# Patient Record
Sex: MALE | Race: WHITE | NOT HISPANIC OR LATINO | Employment: FULL TIME | URBAN - NONMETROPOLITAN AREA
[De-identification: names, ages, dates, MRNs, and addresses within clinical notes are randomized per-mention and may not be internally consistent; named-entity substitution may affect disease eponyms.]

---

## 2017-10-24 ENCOUNTER — APPOINTMENT (EMERGENCY)
Dept: CT IMAGING | Facility: HOSPITAL | Age: 57
End: 2017-10-24
Payer: OTHER MISCELLANEOUS

## 2017-10-24 ENCOUNTER — HOSPITAL ENCOUNTER (EMERGENCY)
Facility: HOSPITAL | Age: 57
Discharge: HOME | End: 2017-10-24
Attending: EMERGENCY MEDICINE | Admitting: EMERGENCY MEDICINE
Payer: OTHER MISCELLANEOUS

## 2017-10-24 VITALS
OXYGEN SATURATION: 97 % | SYSTOLIC BLOOD PRESSURE: 183 MMHG | HEART RATE: 80 BPM | WEIGHT: 270 LBS | RESPIRATION RATE: 18 BRPM | TEMPERATURE: 97.8 F | DIASTOLIC BLOOD PRESSURE: 96 MMHG

## 2017-10-24 DIAGNOSIS — R10.9 ACUTE RIGHT FLANK PAIN: Primary | ICD-10-CM

## 2017-10-24 LAB
ANION GAP SERPL CALCULATED.3IONS-SCNC: 8 MMOL/L (ref 4–13)
BASOPHILS # BLD AUTO: 0.03 THOUSANDS/ΜL (ref 0–0.1)
BASOPHILS NFR BLD AUTO: 0 % (ref 0–1)
BILIRUB UR QL STRIP: NEGATIVE
BUN SERPL-MCNC: 15 MG/DL (ref 5–25)
CALCIUM SERPL-MCNC: 8.9 MG/DL (ref 8.3–10.1)
CHLORIDE SERPL-SCNC: 104 MMOL/L (ref 100–108)
CLARITY UR: CLEAR
CO2 SERPL-SCNC: 26 MMOL/L (ref 21–32)
COLOR UR: YELLOW
CREAT SERPL-MCNC: 0.96 MG/DL (ref 0.6–1.3)
EOSINOPHIL # BLD AUTO: 0.28 THOUSAND/ΜL (ref 0–0.61)
EOSINOPHIL NFR BLD AUTO: 3 % (ref 0–6)
ERYTHROCYTE [DISTWIDTH] IN BLOOD BY AUTOMATED COUNT: 12.4 % (ref 11.6–15.1)
GFR SERPL CREATININE-BSD FRML MDRD: 87 ML/MIN/1.73SQ M
GLUCOSE SERPL-MCNC: 89 MG/DL (ref 65–140)
GLUCOSE UR STRIP-MCNC: NEGATIVE MG/DL
HCT VFR BLD AUTO: 41.7 % (ref 36.5–49.3)
HGB BLD-MCNC: 14.4 G/DL (ref 12–17)
HGB UR QL STRIP.AUTO: NEGATIVE
KETONES UR STRIP-MCNC: NEGATIVE MG/DL
LEUKOCYTE ESTERASE UR QL STRIP: NEGATIVE
LYMPHOCYTES # BLD AUTO: 3.06 THOUSANDS/ΜL (ref 0.6–4.47)
LYMPHOCYTES NFR BLD AUTO: 38 % (ref 14–44)
MCH RBC QN AUTO: 30.4 PG (ref 26.8–34.3)
MCHC RBC AUTO-ENTMCNC: 34.5 G/DL (ref 31.4–37.4)
MCV RBC AUTO: 88 FL (ref 82–98)
MONOCYTES # BLD AUTO: 0.96 THOUSAND/ΜL (ref 0.17–1.22)
MONOCYTES NFR BLD AUTO: 12 % (ref 4–12)
NEUTROPHILS # BLD AUTO: 3.82 THOUSANDS/ΜL (ref 1.85–7.62)
NEUTS SEG NFR BLD AUTO: 47 % (ref 43–75)
NITRITE UR QL STRIP: NEGATIVE
PH UR STRIP.AUTO: 5.5 [PH] (ref 4.5–8)
PLATELET # BLD AUTO: 215 THOUSANDS/UL (ref 149–390)
PMV BLD AUTO: 9.5 FL (ref 8.9–12.7)
POTASSIUM SERPL-SCNC: 3.8 MMOL/L (ref 3.5–5.3)
PROT UR STRIP-MCNC: NEGATIVE MG/DL
RBC # BLD AUTO: 4.74 MILLION/UL (ref 3.88–5.62)
SODIUM SERPL-SCNC: 138 MMOL/L (ref 136–145)
SP GR UR STRIP.AUTO: 1.02 (ref 1–1.03)
UROBILINOGEN UR QL STRIP.AUTO: 0.2 E.U./DL
WBC # BLD AUTO: 8.15 THOUSAND/UL (ref 4.31–10.16)

## 2017-10-24 PROCEDURE — 80048 BASIC METABOLIC PNL TOTAL CA: CPT | Performed by: EMERGENCY MEDICINE

## 2017-10-24 PROCEDURE — 81003 URINALYSIS AUTO W/O SCOPE: CPT | Performed by: EMERGENCY MEDICINE

## 2017-10-24 PROCEDURE — 96374 THER/PROPH/DIAG INJ IV PUSH: CPT

## 2017-10-24 PROCEDURE — 96376 TX/PRO/DX INJ SAME DRUG ADON: CPT

## 2017-10-24 PROCEDURE — 99284 EMERGENCY DEPT VISIT MOD MDM: CPT

## 2017-10-24 PROCEDURE — 96361 HYDRATE IV INFUSION ADD-ON: CPT

## 2017-10-24 PROCEDURE — 85025 COMPLETE CBC W/AUTO DIFF WBC: CPT | Performed by: EMERGENCY MEDICINE

## 2017-10-24 PROCEDURE — 74176 CT ABD & PELVIS W/O CONTRAST: CPT

## 2017-10-24 PROCEDURE — 36415 COLL VENOUS BLD VENIPUNCTURE: CPT | Performed by: EMERGENCY MEDICINE

## 2017-10-24 PROCEDURE — 96375 TX/PRO/DX INJ NEW DRUG ADDON: CPT

## 2017-10-24 RX ORDER — MORPHINE SULFATE 15 MG/1
15 TABLET ORAL EVERY 6 HOURS PRN
Qty: 30 TABLET | Refills: 0 | Status: SHIPPED | OUTPATIENT
Start: 2017-10-24 | End: 2017-10-24

## 2017-10-24 RX ORDER — KETOROLAC TROMETHAMINE 30 MG/ML
10 INJECTION, SOLUTION INTRAMUSCULAR; INTRAVENOUS ONCE
Status: COMPLETED | OUTPATIENT
Start: 2017-10-24 | End: 2017-10-24

## 2017-10-24 RX ORDER — LIDOCAINE 50 MG/G
1 PATCH TOPICAL ONCE
Status: DISCONTINUED | OUTPATIENT
Start: 2017-10-24 | End: 2017-10-25 | Stop reason: HOSPADM

## 2017-10-24 RX ORDER — MORPHINE SULFATE 15 MG/1
15 TABLET ORAL EVERY 6 HOURS PRN
Qty: 11 TABLET | Refills: 0 | Status: SHIPPED | OUTPATIENT
Start: 2017-10-24 | End: 2017-11-19

## 2017-10-24 RX ORDER — FENTANYL CITRATE 50 UG/ML
50 INJECTION, SOLUTION INTRAMUSCULAR; INTRAVENOUS ONCE
Status: COMPLETED | OUTPATIENT
Start: 2017-10-24 | End: 2017-10-24

## 2017-10-24 RX ORDER — COLCHICINE 0.6 MG/1
0.6 TABLET ORAL DAILY
COMMUNITY
End: 2019-05-13 | Stop reason: HOSPADM

## 2017-10-24 RX ADMIN — LIDOCAINE 1 PATCH: 50 PATCH CUTANEOUS at 19:41

## 2017-10-24 RX ADMIN — FENTANYL CITRATE 50 MCG: 50 INJECTION, SOLUTION INTRAMUSCULAR; INTRAVENOUS at 20:36

## 2017-10-24 RX ADMIN — KETOROLAC TROMETHAMINE 9.9 MG: 30 INJECTION, SOLUTION INTRAMUSCULAR at 19:40

## 2017-10-24 RX ADMIN — FENTANYL CITRATE 50 MCG: 50 INJECTION, SOLUTION INTRAMUSCULAR; INTRAVENOUS at 19:40

## 2017-10-24 RX ADMIN — SODIUM CHLORIDE 1000 ML: 0.9 INJECTION, SOLUTION INTRAVENOUS at 19:40

## 2017-10-24 NOTE — ED PROVIDER NOTES
History  Chief Complaint   Patient presents with    Flank Pain     Patient started with right sided flank pain yesterday  History provided by:  Spouse and patient  Flank Pain   Pain location:  R flank  Pain quality: aching and dull    Pain radiates to:  Does not radiate  Pain severity:  Moderate  Onset quality:  Sudden  Duration:  2 days  Timing:  Constant  Progression:  Worsening  Chronicity:  New (No previous hx back surgery/trauma/injury  Previous hx UTI related to gout but no /renal surgery or trauma at any point )  Context: not awakening from sleep, not diet changes, not eating, not previous surgeries, not recent illness, not retching, not sick contacts, not suspicious food intake and not trauma (Does not recall any unusual twisting/bending/turning prior to onset of sx)    Relieved by:  Nothing  Worsened by: Movement and position changes  Ineffective treatments: Took indomethacin x2 doses suspecting sx related to gout attack without improvement in sx  Associated symptoms: fatigue    Associated symptoms: no anorexia, no chest pain, no chills, no cough, no diarrhea, no dysuria, no fever, no hematuria, no nausea, no shortness of breath and no vomiting    Associated symptoms comment:  Denies paresthesias/weakness in either lower extremity  Denies urinary/fecal incontinence  Risk factors: no aspirin use, not elderly, has not had multiple surgeries, no NSAID use, not obese and no recent hospitalization      DDx includes but is not limited to: ureterolithiasis, occult lumbar spine fx, lumbar muscle strain, ascending urinary tract infection  Patient is adamant that there was no preceding trauma or injury prior to onset of pain; concern for mechanical cause somewhat less due to this although examination findings are suggestive of mechanical cause  Plan labs/ua/ct renal stone protocol  Prior to Admission Medications   Prescriptions Last Dose Informant Patient Reported?  Taking?   colchicine (COLCRYS) 0 6 mg tablet   Yes Yes   Sig: Take 0 6 mg by mouth daily      Facility-Administered Medications: None       Past Medical History:   Diagnosis Date    Gout        Past Surgical History:   Procedure Laterality Date    ROTATOR CUFF REPAIR         History reviewed  No pertinent family history  I have reviewed and agree with the history as documented  Social History   Substance Use Topics    Smoking status: Never Smoker    Smokeless tobacco: Not on file    Alcohol use No        Review of Systems   Constitutional: Positive for fatigue  Negative for chills and fever  Respiratory: Negative for cough and shortness of breath  Cardiovascular: Negative for chest pain and palpitations  Gastrointestinal: Negative for abdominal pain, anorexia, diarrhea, nausea and vomiting  Genitourinary: Positive for flank pain (Right-sided)  Negative for difficulty urinating, dysuria and hematuria  Musculoskeletal: Positive for arthralgias and back pain  Negative for gait problem, neck pain and neck stiffness  Skin: Negative for color change, pallor, rash and wound  Neurological: Negative for dizziness, weakness, numbness and headaches  Hematological: Negative for adenopathy  Does not bruise/bleed easily  All other systems reviewed and are negative  Physical Exam  ED Triage Vitals [10/24/17 1910]   Temperature Pulse Respirations Blood Pressure SpO2   97 8 °F (36 6 °C) 77 18 (!) 183/96 97 %      Temp Source Heart Rate Source Patient Position - Orthostatic VS BP Location FiO2 (%)   Temporal Monitor Standing Right arm --      Pain Score       Worst Possible Pain           Physical Exam   Constitutional: He is oriented to person, place, and time  He appears well-developed and well-nourished  He is cooperative  He appears distressed (moderate painful distress with movement)  HENT:   Head: Normocephalic and atraumatic     Right Ear: Hearing and external ear normal    Left Ear: Hearing and external ear normal    Nose: Nose normal    Mouth/Throat: Uvula is midline, oropharynx is clear and moist and mucous membranes are normal  No oropharyngeal exudate  Eyes: Conjunctivae, EOM and lids are normal  Pupils are equal, round, and reactive to light  Neck: Trachea normal, normal range of motion and phonation normal  Neck supple  No JVD present  No tracheal tenderness, no spinous process tenderness and no muscular tenderness present  No tracheal deviation present  No thyroid mass and no thyromegaly present  Cardiovascular: Normal rate, regular rhythm, S1 normal, S2 normal, normal heart sounds and intact distal pulses  Exam reveals no gallop and no friction rub  No murmur heard  Pulses:       Radial pulses are 2+ on the right side, and 2+ on the left side  Dorsalis pedis pulses are 2+ on the right side, and 2+ on the left side  Posterior tibial pulses are 2+ on the right side, and 2+ on the left side  Pulmonary/Chest: Effort normal and breath sounds normal  No stridor  No respiratory distress  He has no decreased breath sounds  He has no wheezes  He has no rhonchi  He has no rales  He exhibits no tenderness  Abdominal: Soft  He exhibits no distension and no mass  There is no tenderness  There is no rigidity, no rebound, no guarding and no CVA tenderness  Musculoskeletal: Normal range of motion  He exhibits no edema or deformity  Cervical back: Normal         Thoracic back: Normal         Lumbar back: He exhibits tenderness  He exhibits normal range of motion, no bony tenderness, no swelling, no edema, no deformity, no laceration, no pain, no spasm and normal pulse  Back:    Lymphadenopathy:     He has no cervical adenopathy  Neurological: He is alert and oriented to person, place, and time  He has normal strength  No sensory deficit  He exhibits normal muscle tone  GCS eye subscore is 4  GCS verbal subscore is 5  GCS motor subscore is 6  Strength 5/5 in LE bilaterally   Sensation intact to light touch in LE bilaterally  Skin: Skin is warm, dry and intact  No rash noted  No erythema  Psychiatric: He has a normal mood and affect  His speech is normal and behavior is normal    Nursing note and vitals reviewed        ED Medications  Medications   lidocaine (LIDODERM) 5 % patch 1 patch (1 patch Transdermal Medication Applied 10/24/17 1941)   ketorolac (TORADOL) 30 mg/mL injection 9 9 mg (9 9 mg Intravenous Given 10/24/17 1940)   fentanyl citrate (PF) 100 MCG/2ML 50 mcg (50 mcg Intravenous Given 10/24/17 1940)   sodium chloride 0 9 % bolus 1,000 mL (1,000 mL Intravenous New Bag 10/24/17 1940)   fentanyl citrate (PF) 100 MCG/2ML 50 mcg (50 mcg Intravenous Given 10/24/17 2036)       Diagnostic Studies  Labs Reviewed   CBC AND DIFFERENTIAL - Normal       Result Value Ref Range Status    WBC 8 15  4 31 - 10 16 Thousand/uL Final    RBC 4 74  3 88 - 5 62 Million/uL Final    Hemoglobin 14 4  12 0 - 17 0 g/dL Final    Hematocrit 41 7  36 5 - 49 3 % Final    MCV 88  82 - 98 fL Final    MCH 30 4  26 8 - 34 3 pg Final    MCHC 34 5  31 4 - 37 4 g/dL Final    RDW 12 4  11 6 - 15 1 % Final    MPV 9 5  8 9 - 12 7 fL Final    Platelets 751  414 - 390 Thousands/uL Final    Neutrophils Relative 47  43 - 75 % Final    Lymphocytes Relative 38  14 - 44 % Final    Monocytes Relative 12  4 - 12 % Final    Eosinophils Relative 3  0 - 6 % Final    Basophils Relative 0  0 - 1 % Final    Neutrophils Absolute 3 82  1 85 - 7 62 Thousands/µL Final    Lymphocytes Absolute 3 06  0 60 - 4 47 Thousands/µL Final    Monocytes Absolute 0 96  0 17 - 1 22 Thousand/µL Final    Eosinophils Absolute 0 28  0 00 - 0 61 Thousand/µL Final    Basophils Absolute 0 03  0 00 - 0 10 Thousands/µL Final   UA W REFLEX TO MICROSCOPIC WITH REFLEX TO CULTURE - Normal    Color, UA Yellow   Final    Clarity, UA Clear   Final    Specific Gravity, UA 1 025  1 003 - 1 030 Final    pH, UA 5 5  4 5 - 8 0 Final    Leukocytes, UA Negative  Negative Final    Nitrite, UA Negative  Negative Final    Protein, UA Negative  Negative mg/dl Final    Glucose, UA Negative  Negative mg/dl Final    Ketones, UA Negative  Negative mg/dl Final    Urobilinogen, UA 0 2  0 2, 1 0 E U /dl E U /dl Final    Bilirubin, UA Negative  Negative Final    Blood, UA Negative  Negative, Trace-Intact Final   BASIC METABOLIC PANEL    Sodium 804  136 - 145 mmol/L Final    Potassium 3 8  3 5 - 5 3 mmol/L Final    Chloride 104  100 - 108 mmol/L Final    CO2 26  21 - 32 mmol/L Final    Anion Gap 8  4 - 13 mmol/L Final    BUN 15  5 - 25 mg/dL Final    Creatinine 0 96  0 60 - 1 30 mg/dL Final    Comment: Standardized to IDMS reference method    Glucose 89  65 - 140 mg/dL Final    Comment:   If the patient is fasting, the ADA then defines impaired fasting glucose as > 100 mg/dL and diabetes as > or equal to 123 mg/dL  Specimen collection should occur prior to Sulfasalazine administration due to the potential for falsely depressed results  Specimen collection should occur prior to Sulfapyridine administration due to the potential for falsely elevated results  Calcium 8 9  8 3 - 10 1 mg/dL Final    eGFR 87  ml/min/1 73sq m Final    Narrative:     National Kidney Disease Education Program recommendations are as follows:  GFR calculation is accurate only with a steady state creatinine  Chronic Kidney disease less than 60 ml/min/1 73 sq  meters  Kidney failure less than 15 ml/min/1 73 sq  meters  CT renal stone study abdomen pelvis without contrast   Final Result      No hydronephrosis  The right kidney is in pelvic location  Noninflamed appendix  No acute inflammatory changes in the abdomen or pelvis  Workstation performed: [id number]             Procedures  Procedures      Phone Contacts  ED Phone Contact    ED Course  ED Course as of Oct 24 2220   Tue Oct 24, 2017   2017 1  WBC wnl   2  Hg/Hct wnl   3  Plt wnl   4  Electrolytes wnl   BUN/Cr wnl   5  CT completed and reviewed--no acute abnormality that accounts for sx; of note R kidney is intrapelvic  Currently pending UA  2147 UA wnl: no acute abnormalities present  2217 All results noted and d/w patient  Sx may have etiology in simple MSK issue strain (atraumatic) vs atypical gout sx with soft tissue predominance  In either case, emphasized patient's need for continued use of NSAID and additional symptomatic therapy with oral opioid/topical lidocaine patch  Given diagnostic uncertainty will certainly require PMD f/u  All questions answered prior to discharge; patient and wife expressed understanding and agreed to plan  MDM  Number of Diagnoses or Management Options  Acute right flank pain: new and requires workup     Amount and/or Complexity of Data Reviewed  Clinical lab tests: ordered and reviewed  Tests in the radiology section of CPT®: ordered and reviewed  Decide to obtain previous medical records or to obtain history from someone other than the patient: yes  Obtain history from someone other than the patient: yes  Review and summarize past medical records: yes  Independent visualization of images, tracings, or specimens: yes    Risk of Complications, Morbidity, and/or Mortality  Presenting problems: high  Diagnostic procedures: high  Management options: high    Patient Progress  Patient progress: improved    CritCare Time    Disposition  Final diagnoses:   Acute right flank pain     ED Disposition     ED Disposition Condition Comment    Discharge  Ashley Castillo discharge to home/self care      Condition at discharge: Good        Follow-up Information     Follow up With Specialties Details Why Contact Info    Angela Tadeo MD Family Medicine Schedule an appointment as soon as possible for a visit in 1 week For further evaluation Rashi Flanagan [phone] [facility]  [phone]          Patient's Medications   Discharge Prescriptions    MORPHINE (MSIR) 15 MG TABLET    Take 1 tablet by mouth every 6 (six) hours as needed for severe pain (Do not drive or drink alcohol while using ) Max Daily Amount: 60 mg       Start Date: 10/24/2017End Date: --       Order Dose: 15 mg       Quantity: 11 tablet    Refills: 0     No discharge procedures on file      ED Provider  Electronically Signed by       Gurinder Fulton DO  10/24/17 8012

## 2017-10-25 ENCOUNTER — TRANSCRIBE ORDERS (OUTPATIENT)
Dept: ADMINISTRATIVE | Facility: HOSPITAL | Age: 57
End: 2017-10-25

## 2017-10-25 ENCOUNTER — HOSPITAL ENCOUNTER (OUTPATIENT)
Dept: RADIOLOGY | Facility: HOSPITAL | Age: 57
Discharge: HOME | End: 2017-10-25
Payer: OTHER MISCELLANEOUS

## 2017-10-25 DIAGNOSIS — M25.551 RIGHT HIP PAIN: Primary | ICD-10-CM

## 2017-10-25 DIAGNOSIS — M54.5 LOW BACK PAIN, UNSPECIFIED BACK PAIN LATERALITY, UNSPECIFIED CHRONICITY, WITH SCIATICA PRESENCE UNSPECIFIED: ICD-10-CM

## 2017-10-25 DIAGNOSIS — M25.551 RIGHT HIP PAIN: ICD-10-CM

## 2017-10-25 PROCEDURE — 72202 X-RAY EXAM SI JOINTS 3/> VWS: CPT

## 2017-10-25 PROCEDURE — 72110 X-RAY EXAM L-2 SPINE 4/>VWS: CPT

## 2017-10-25 PROCEDURE — 73502 X-RAY EXAM HIP UNI 2-3 VIEWS: CPT

## 2017-10-25 NOTE — DISCHARGE INSTRUCTIONS
Flank Pain   WHAT YOU NEED TO KNOW:   Flank pain is felt in the area below your ribcage and above your hip bones, often in the lower back  Your pain may be dull or so severe that you cannot get comfortable  The pain may stay in one area or radiate to another area  It may worsen and lighten in waves  Flank pain is often a sign of problems with your urinary tract, such as a kidney stone or infection  DISCHARGE INSTRUCTIONS:   Return to the emergency department if:   · You have a fever  · Your heart is fluttering or jumping  · You see blood in your urine  · Your pain radiates into your lower abdomen and genital area  · You have intense pain in your low back next to your spine  · You are much more tired than usual and have no desire to eat  · You have a headache and your muscles jerk  Contact your healthcare provider if:   · You have an upset stomach and are vomiting  · You have to urinate more often, and with urgency  · Your pain worsens or does not improve, and you cannot get comfortable  · You pass a stone when you urinate  · You have questions or concerns about your condition or care  Medicines: The following medicines may be ordered for you:  · Pain medicine  may help decrease or relieve your pain  Do not wait until the pain is severe before you take your medicine  · Antibiotics  may help treat a urinary tract infection caused by bacteria  · Take your medicine as directed  Contact your healthcare provider if you think your medicine is not helping or if you have side effects  Tell him of her if you are allergic to any medicine  Keep a list of the medicines, vitamins, and herbs you take  Include the amounts, and when and why you take them  Bring the list or the pill bottles to follow-up visits  Carry your medicine list with you in case of an emergency    Follow up with your healthcare provider in 1 to 2 days or as directed:  Write down your questions so you remember to ask them during your visits  © 2017 [address] [employer] Information is for End User's use only and may not be sold, redistributed or otherwise used for commercial purposes  All illustrations and images included in CareNotes® are the copyrighted property of [employer]  or Merlin Olive[employer]  The above information is an  only  It is not intended as medical advice for individual conditions or treatments  Talk to your doctor, nurse or pharmacist before following any medical regimen to see if it is safe and effective for you  Low Back Strain   WHAT YOU NEED TO KNOW:   Low back strain is an injury to your lower back muscles or tendons  Tendons are strong tissues that connect muscles to bones  The lower back supports most of your body weight and helps you move, twist, and bend  DISCHARGE INSTRUCTIONS:   Return to the emergency department if:   · You hear or feel a pop in your lower back  · You have increased swelling or pain in your lower back  · You have trouble moving your legs  · Your legs are numb  Contact your healthcare provider if:   · You have a fever  · Your pain does not go away, even after treatment  · You have questions or concerns about your condition or care  Medicines: The following medicines may be ordered by your healthcare provider:  · Acetaminophen decreases pain and fever  It is available without a doctor's order  Ask how much to take and how often to take it  Follow directions  Acetaminophen can cause liver damage if not taken correctly  · NSAIDs , such as ibuprofen, help decrease swelling, pain, and fever  This medicine is available with or without a doctor's order  NSAIDs can cause stomach bleeding or kidney problems in certain people  If you take blood thinner medicine, always ask your healthcare provider if NSAIDs are safe for you  Always read the medicine label and follow directions      · Muscle relaxers  help decrease pain and muscle spasms  · Prescription pain medicine  may be given  Ask how to take this medicine safely  · Take your medicine as directed  Contact your healthcare provider if you think your medicine is not helping or if you have side effects  Tell him or her if you are allergic to any medicine  Keep a list of the medicines, vitamins, and herbs you take  Include the amounts, and when and why you take them  Bring the list or the pill bottles to follow-up visits  Carry your medicine list with you in case of an emergency  Self-care:   · Rest  as directed  You may need to rest in bed for a period of time after your injury  Do not lift heavy objects  · Apply ice  on your back for 15 to 20 minutes every hour or as directed  Use an ice pack, or put crushed ice in a plastic bag  Cover it with a towel  Ice helps prevent tissue damage and decreases swelling and pain  · Apply heat  on your lower back for 20 to 30 minutes every 2 hours for as many days as directed  Heat helps decrease pain and muscle spasms  · Slowly start to increase your activity  as the pain decreases, or as directed  Prevent another low back strain:   · Use correct body movements  ¨ Bend at the hips and knees when you  objects  Do not bend from the waist  Use your leg muscles as you lift the load  Do not use your back  Keep the object close to your chest as you lift it  Try not to twist or lift anything above your waist     ¨ Change your position often when you stand for long periods of time  Rest one foot on a small box or footrest, and then switch to the other foot often  ¨ Try not to sit for long periods of time  When you do, sit in a straight-backed chair with your feet flat on the floor  ¨ Never reach, pull, or push while you are sitting  · Warm up before you exercise  Do exercises that strengthen your back muscles  Ask your healthcare provider about the best exercise plan for you      · Maintain a healthy weight  Ask your healthcare provider how much you should weigh  Ask him to help you create a weight loss plan if you are overweight  © 2017 [address] [employer] Information is for End User's use only and may not be sold, redistributed or otherwise used for commercial purposes  All illustrations and images included in CareNotes® are the copyrighted property of [employer]  or Merlin Oliveira  The above information is an  only  It is not intended as medical advice for individual conditions or treatments  Talk to your doctor, nurse or pharmacist before following any medical regimen to see if it is safe and effective for you

## 2017-11-19 ENCOUNTER — HOSPITAL ENCOUNTER (EMERGENCY)
Facility: HOSPITAL | Age: 57
Discharge: HOME | End: 2017-11-19
Admitting: EMERGENCY MEDICINE
Payer: OTHER MISCELLANEOUS

## 2017-11-19 VITALS
BODY MASS INDEX: 36.4 KG/M2 | DIASTOLIC BLOOD PRESSURE: 84 MMHG | RESPIRATION RATE: 18 BRPM | SYSTOLIC BLOOD PRESSURE: 140 MMHG | OXYGEN SATURATION: 94 % | WEIGHT: 268.74 LBS | TEMPERATURE: 98.6 F | HEART RATE: 75 BPM | HEIGHT: 72 IN

## 2017-11-19 DIAGNOSIS — M54.50 LOW BACK PAIN: ICD-10-CM

## 2017-11-19 DIAGNOSIS — M51.26 LUMBAR DISC HERNIATION: ICD-10-CM

## 2017-11-19 DIAGNOSIS — M51.36 DEGENERATIVE DISC DISEASE, LUMBAR: ICD-10-CM

## 2017-11-19 DIAGNOSIS — Z78.9 MEDICATION INTOLERANCE: Primary | ICD-10-CM
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
## 2017-11-19 PROCEDURE — 99283 EMERGENCY DEPT VISIT LOW MDM: CPT

## 2017-11-19 RX ORDER — OXYCODONE HYDROCHLORIDE AND ACETAMINOPHEN 5; 325 MG/1; MG/1
1 TABLET ORAL EVERY 8 HOURS PRN
Qty: 10 TABLET | Refills: 0 | Status: SHIPPED | OUTPATIENT
Start: 2017-11-19 | End: 2019-05-13 | Stop reason: HOSPADM

## 2017-11-19 RX ORDER — OXYCODONE HYDROCHLORIDE 10 MG/1
10 TABLET ORAL ONCE
Status: COMPLETED | OUTPATIENT
Start: 2017-11-19 | End: 2017-11-19

## 2017-11-19 RX ORDER — OXYCODONE HCL 10 MG/1
10 TABLET, FILM COATED, EXTENDED RELEASE ORAL EVERY 12 HOURS SCHEDULED
Qty: 10 TABLET | Refills: 0 | Status: SHIPPED | OUTPATIENT
Start: 2017-11-19 | End: 2019-05-13 | Stop reason: HOSPADM

## 2017-11-19 RX ADMIN — OXYCODONE HYDROCHLORIDE 10 MG: 10 TABLET ORAL at 11:19

## 2017-11-19 NOTE — ED PROVIDER NOTES
History  Chief Complaint   Patient presents with    Back Pain     Back pain   Stopped morhine sulfate due to morphine causing SOB  Jona Pill comp case  WC will be faxing MRi results to us  62 yr male presents today for medication change  Pt sustained low back injury on 10/23/17, work comp case  Constant right side low back pain radiates down right leg to knee, some numbness at knee only  No bowel/bladder dysfunction  Ambulatory  No f/c  No n/v/d or abdominal pain, cough, wheezing  Does not recall specifics of injury however presented here to ED 10/24/17 with flank/low back pain  He had normal labs and CT a/p for r/o stone  Was started on MSIR 15mg q6h initially  Had WC followup, MRI completed, and has decreased MSIR requirements to 15mg q12h prn only  He also started physical therapy and feels this is helping greatly  He presents today at the request of his [facility] provider (not open today for appt) to come for medication change as pt has been experiencing shortness of breath after taking morphine  Pt reports within 15-45 minutes of taking morphine he feels "winded like I have to take 4 breaths to catch 1" this sensation dissipates and does not return until he takes the next tablet  He has been taking MSIR since 10/24/17 through current and these sx started the first week of taking it  His last dose of MSIR 15mg was yesterday at 11:00 am  He is taking 800mg ibuprofen q8h as well  His last dose of motrin was last night after dinner  I spoke with patient's  office they plan to fax med list, progress note visit 10/31/17, and mri results for review  He has appt with them Tuesday  History provided by:  Patient and medical records      Prior to Admission Medications   Prescriptions Last Dose Informant Patient Reported? Taking?    Febuxostat (ULORIC PO)   Yes Yes   Sig: Take 1 tablet by mouth daily   colchicine (COLCRYS) 0 6 mg tablet   Yes Yes   Sig: Take 0 6 mg by mouth daily   morphine (MSIR) 15 mg tablet   No No   Sig: Take 1 tablet by mouth every 6 (six) hours as needed for severe pain (Do not drive or drink alcohol while using ) Max Daily Amount: 60 mg      Facility-Administered Medications: None       Past Medical History:   Diagnosis Date    Back injury     Gout        Past Surgical History:   Procedure Laterality Date    ROTATOR CUFF REPAIR         History reviewed  No pertinent family history  I have reviewed and agree with the history as documented  Social History   Substance Use Topics    Smoking status: Never Smoker    Smokeless tobacco: Never Used    Alcohol use No        Review of Systems   Constitutional: Negative for appetite change, chills, diaphoresis, fatigue, fever and unexpected weight change  HENT: Negative for congestion, ear pain, facial swelling, hearing loss, rhinorrhea, sinus pressure, sore throat and tinnitus  Eyes: Negative for photophobia, pain, redness, itching and visual disturbance  Respiratory: Negative for cough, chest tightness and wheezing  Cardiovascular: Negative for chest pain, palpitations and leg swelling  Gastrointestinal: Negative for abdominal pain, constipation, diarrhea, nausea and vomiting  Genitourinary: Negative for dysuria, flank pain, frequency, hematuria, testicular pain and urgency  Musculoskeletal: Positive for back pain  Negative for arthralgias, gait problem, joint swelling and myalgias  Skin: Negative for rash and wound  Neurological: Negative for dizziness, tremors, syncope and headaches         Physical Exam  ED Triage Vitals [11/19/17 1011]   Temperature Pulse Respirations Blood Pressure SpO2   98 6 °F (37 °C) 76 18 140/84 94 %      Temp Source Heart Rate Source Patient Position - Orthostatic VS BP Location FiO2 (%)   Temporal -- Sitting Right arm --      Pain Score       9           Orthostatic Vital Signs  Vitals:    11/19/17 1011 11/19/17 1015   BP: 140/84 140/84   Pulse: 76 75   Patient Position - Orthostatic VS: Sitting        Physical Exam   Constitutional: He is oriented to person, place, and time  He appears well-developed and well-nourished  No distress  obese   HENT:   Head: Normocephalic and atraumatic  Mouth/Throat: Oropharynx is clear and moist    Eyes: Conjunctivae are normal  Pupils are equal, round, and reactive to light  Cardiovascular: Normal rate, regular rhythm, normal heart sounds and intact distal pulses  No murmur heard  Pulmonary/Chest: Effort normal and breath sounds normal  No respiratory distress  He exhibits no tenderness  Abdominal: Soft  Bowel sounds are normal    Musculoskeletal: Normal range of motion  He exhibits tenderness (right lumbar msk tenderness  )  He exhibits no edema or deformity  HF/HE KF/KE DF/PF intact  No saddle anesthesia  Sensation intact to light touch bilateral lower extremities  Patellar and achilles reflexes +2 symmetric  Skin pink and warm  DP pulses +2 by palp  Straight leg raise negative bilaterally  Ambulatory with somewhat punched forward posture but able to stand completely upright given time  Neurological: He is alert and oriented to person, place, and time  Skin: Skin is warm and dry  He is not diaphoretic  Psychiatric: He has a normal mood and affect  Nursing note and vitals reviewed  ED Medications  Medications   oxyCODONE (ROXICODONE) immediate release tablet 10 mg (10 mg Oral Given 11/19/17 1119)       Diagnostic Studies  Results Reviewed     None                         No orders to display              Procedures  Procedures       Phone Contacts  ED Phone Contact    ED Course  ED Course    MDM  Number of Diagnoses or Management Options  Degenerative disc disease, lumbar:   Low back pain: established and improving  Lumbar disc herniation:   Medication intolerance: established and worsening  Work related injury:   Diagnosis management comments: 62 yr male with lumbar djd and disc herniation evident on MRI, work comp injury case   Here today to change medications due to intolerance to MSIR  Takes ibuprofen as well  PT is improving his sx also  Not tried any other prescription meds since time of injury  Will switch to 12 hr oxycodone bid and percocet 5/325 q6h prn for breakthrough  Has WC followup this week  Amount and/or Complexity of Data Reviewed  Tests in the radiology section of CPT®: reviewed (See review of faxed MRI records in chart)  Decide to obtain previous medical records or to obtain history from someone other than the patient: yes (Mri results from [facility] provider faxed to us)  Obtain history from someone other than the patient: yes  Review and summarize past medical records: yes (Ed visit note 10/24/17)    Patient Progress  Patient progress: stable    CritCare Time    Disposition  Final diagnoses:   Low back pain   Work related injury   Medication intolerance   Degenerative disc disease, lumbar   Lumbar disc herniation     Time reflects when diagnosis was documented in both MDM as applicable and the Disposition within this note     Time User Action Codes Description Comment    11/19/2017 10:52 AM Jonna Clore Add [M54 5] Low back pain     11/19/2017 10:53 AM Jonna Clore Add [Y99 0] Work related injury     11/19/2017 10:53 AM Jonna Clore Add [Z78 9] Medication intolerance     11/19/2017 11:18 AM Jonna Clore Add [M51 36] Degenerative disc disease, lumbar     11/19/2017 11:18 AM Jonna Clore Add [M51 26] Lumbar disc herniation     11/19/2017 11:18 AM Jonna Clore Modify [M54 5] Low back pain     11/19/2017 11:18 AM Jonna Clore Modify [Z78 9] Medication intolerance       ED Disposition     ED Disposition Condition Comment    Discharge  Patricia James discharge to home/self care      Condition at discharge: Good        Follow-up Information     Follow up With Specialties Details Why Contact Info    DO Marleni Gallardo Workers Compensation Injury tuesday 21st appointment [address] [place] [address]  [phone]          Patient's Medications   Discharge Prescriptions    OXYCODONE (OXYCONTIN) 10 MG 12 HR TABLET    Take 1 tablet by mouth every 12 (twelve) hours Max Daily Amount: 20 mg       Start Date: 11/19/2017End Date: --       Order Dose: 10 mg       Quantity: 10 tablet    Refills: 0    OXYCODONE-ACETAMINOPHEN (PERCOCET) 5-325 MG PER TABLET    Take 1 tablet by mouth every 8 (eight) hours as needed for moderate pain or severe pain (breakthrough pain only) Max Daily Amount: 3 tablets       Start Date: 11/19/2017End Date: --       Order Dose: 1 tablet       Quantity: 10 tablet    Refills: 0     No discharge procedures on file      ED Provider  Electronically Signed by           Denice Abrams PA-C  11/19/17 1121

## 2017-11-19 NOTE — DISCHARGE INSTRUCTIONS
Narcotic Pain Management   WHAT YOU NEED TO KNOW:   It is important to manage your pain so you can rest and heal  It will also help you return to your normal activities  DISCHARGE INSTRUCTIONS:   Call 911 or have someone call 911 for any of the following:   · You are breathing slower than normal, or you have trouble breathing  · You cannot be woken  · You have a seizure  Return to the emergency department if:   · Your heart is beating slower than usual     · Your heart feels like it is jumping or fluttering  · You have trouble staying awake  · You have severe muscle pain or weakness  · You see or hear things that are not real   Contact your healthcare provider or pain specialist if:   · You are too dizzy to stand up  · Your pain gets worse or you have new pain  · Your pain does not get better after you use your narcotic medicine  · You cannot do your usual activities because of side effects from the narcotic  · You are constipated or have abdominal pain  · You have questions or concerns about your condition or care  Narcotic safety:   · Take your medicine as directed  Ask if you need more information on how to take your medicine correctly  Follow up with your healthcare provider regularly  You may need to have your dose adjusted  Do not use narcotic medicine if you are pregnant or breastfeeding  Narcotic medicines can be transferred to your baby through your blood and breast milk  · Give your healthcare provider a list of all your medicines  Include any over-the-counter medicines, vitamins, and herbs  It can be dangerous to take narcotics with certain other medicines, such as antihistamines  · Keep your medicine in a safe place  Store your narcotic medicine in a locked cabinet to keep it away from children and others  · Do not drink alcohol while you use narcotics  Alcohol use with a narcotic medicine can make you sleepy and slow your breathing rate   You may stop breathing completely  · Do not drive or operate heavy machinery after you take narcotic medicine  Narcotic medicine can make you drowsy and make it hard to concentrate  You may injure yourself or others if you drive or operate heavy machinery while taking your medicine  Drink more liquids and eat high-fiber foods:  Liquids and fiber will help prevent constipation  Ask your healthcare provider what liquids are right for you and how much you should drink  Also ask for a list of foods that contain fiber  Follow up with your healthcare provider as directed: You may be referred to a pain specialist for more tests and treatment  Write down your questions so you remember to ask them during your visits  © 2017 [address] [facility] Information is for End User's use only and may not be sold, redistributed or otherwise used for commercial purposes  All illustrations and images included in CareNotes® are the copyrighted property of A D A M , Inc  or Merlin Oliveira  The above information is an  only  It is not intended as medical advice for individual conditions or treatments  Talk to your doctor, nurse or pharmacist before following any medical regimen to see if it is safe and effective for you

## 2019-04-26 ENCOUNTER — APPOINTMENT (OUTPATIENT)
Dept: LAB | Facility: MEDICAL CENTER | Age: 59
End: 2019-04-26
Payer: COMMERCIAL

## 2019-04-26 ENCOUNTER — TRANSCRIBE ORDERS (OUTPATIENT)
Dept: LAB | Facility: MEDICAL CENTER | Age: 59
End: 2019-04-26

## 2019-04-26 DIAGNOSIS — M89.9 BONE DISEASE: ICD-10-CM

## 2019-04-26 DIAGNOSIS — L30.9 DERMATITIS: Primary | ICD-10-CM

## 2019-04-26 DIAGNOSIS — M10.9 GOUT, UNSPECIFIED CAUSE, UNSPECIFIED CHRONICITY, UNSPECIFIED SITE: ICD-10-CM

## 2019-04-26 DIAGNOSIS — R03.0 ELEVATED BLOOD PRESSURE, SITUATIONAL: ICD-10-CM

## 2019-04-26 DIAGNOSIS — Z12.5 SCREENING FOR PROSTATE CANCER: ICD-10-CM

## 2019-04-26 DIAGNOSIS — L30.9 DERMATITIS: ICD-10-CM

## 2019-04-26 LAB
ALBUMIN SERPL BCP-MCNC: 4.1 G/DL (ref 3.5–5)
ALP SERPL-CCNC: 74 U/L (ref 46–116)
ALT SERPL W P-5'-P-CCNC: 41 U/L (ref 12–78)
ANION GAP SERPL CALCULATED.3IONS-SCNC: 6 MMOL/L (ref 4–13)
AST SERPL W P-5'-P-CCNC: 22 U/L (ref 5–45)
BILIRUB SERPL-MCNC: 0.6 MG/DL (ref 0.2–1)
BUN SERPL-MCNC: 18 MG/DL (ref 5–25)
CALCIUM SERPL-MCNC: 9.2 MG/DL (ref 8.3–10.1)
CHLORIDE SERPL-SCNC: 108 MMOL/L (ref 100–108)
CHOLEST SERPL-MCNC: 159 MG/DL (ref 50–200)
CO2 SERPL-SCNC: 28 MMOL/L (ref 21–32)
CREAT SERPL-MCNC: 0.93 MG/DL (ref 0.6–1.3)
ERYTHROCYTE [DISTWIDTH] IN BLOOD BY AUTOMATED COUNT: 12.5 % (ref 11.6–15.1)
GFR SERPL CREATININE-BSD FRML MDRD: 90 ML/MIN/1.73SQ M
GLUCOSE P FAST SERPL-MCNC: 89 MG/DL (ref 65–99)
HCT VFR BLD AUTO: 45.9 % (ref 36.5–49.3)
HDLC SERPL-MCNC: 40 MG/DL (ref 40–60)
HGB BLD-MCNC: 15.2 G/DL (ref 12–17)
LDLC SERPL CALC-MCNC: 101 MG/DL (ref 0–100)
MCH RBC QN AUTO: 29.9 PG (ref 26.8–34.3)
MCHC RBC AUTO-ENTMCNC: 33.1 G/DL (ref 31.4–37.4)
MCV RBC AUTO: 90 FL (ref 82–98)
NONHDLC SERPL-MCNC: 119 MG/DL
PLATELET # BLD AUTO: 229 THOUSANDS/UL (ref 149–390)
PMV BLD AUTO: 10.4 FL (ref 8.9–12.7)
POTASSIUM SERPL-SCNC: 4.8 MMOL/L (ref 3.5–5.3)
PROT SERPL-MCNC: 7.4 G/DL (ref 6.4–8.2)
PSA SERPL-MCNC: 0.6 NG/ML (ref 0–4)
RBC # BLD AUTO: 5.09 MILLION/UL (ref 3.88–5.62)
SODIUM SERPL-SCNC: 142 MMOL/L (ref 136–145)
T4 FREE SERPL-MCNC: 1 NG/DL (ref 0.76–1.46)
TRIGL SERPL-MCNC: 92 MG/DL
TSH SERPL DL<=0.05 MIU/L-ACNC: 1.29 UIU/ML (ref 0.36–3.74)
URATE SERPL-MCNC: 4.9 MG/DL (ref 4.2–8)
WBC # BLD AUTO: 6.71 THOUSAND/UL (ref 4.31–10.16)

## 2019-04-26 PROCEDURE — 80053 COMPREHEN METABOLIC PANEL: CPT

## 2019-04-26 PROCEDURE — 84550 ASSAY OF BLOOD/URIC ACID: CPT

## 2019-04-26 PROCEDURE — 84439 ASSAY OF FREE THYROXINE: CPT

## 2019-04-26 PROCEDURE — 85027 COMPLETE CBC AUTOMATED: CPT

## 2019-04-26 PROCEDURE — 86618 LYME DISEASE ANTIBODY: CPT

## 2019-04-26 PROCEDURE — G0103 PSA SCREENING: HCPCS

## 2019-04-26 PROCEDURE — 36415 COLL VENOUS BLD VENIPUNCTURE: CPT

## 2019-04-26 PROCEDURE — 84443 ASSAY THYROID STIM HORMONE: CPT

## 2019-04-26 PROCEDURE — 80061 LIPID PANEL: CPT

## 2019-04-28 LAB
B BURGDOR IGG SER IA-ACNC: 0.35
B BURGDOR IGM SER IA-ACNC: 0.36

## 2019-05-12 ENCOUNTER — APPOINTMENT (EMERGENCY)
Dept: CT IMAGING | Facility: HOSPITAL | Age: 59
End: 2019-05-12
Payer: OTHER MISCELLANEOUS

## 2019-05-12 ENCOUNTER — HOSPITAL ENCOUNTER (EMERGENCY)
Facility: HOSPITAL | Age: 59
End: 2019-05-13
Attending: EMERGENCY MEDICINE | Admitting: EMERGENCY MEDICINE
Payer: OTHER MISCELLANEOUS

## 2019-05-12 DIAGNOSIS — R29.898 LEFT LEG WEAKNESS: ICD-10-CM

## 2019-05-12 DIAGNOSIS — M54.50 ACUTE LOW BACK PAIN: Primary | ICD-10-CM

## 2019-05-12 PROBLEM — M51.379 DEGENERATIVE DISC DISEASE AT L5-S1 LEVEL: Status: ACTIVE | Noted: 2019-05-12

## 2019-05-12 PROBLEM — M51.37 DEGENERATIVE DISC DISEASE AT L5-S1 LEVEL: Status: ACTIVE | Noted: 2019-05-12

## 2019-05-12 PROBLEM — M54.16 LUMBAR BACK PAIN WITH RADICULOPATHY AFFECTING LEFT LOWER EXTREMITY: Status: ACTIVE | Noted: 2019-05-12

## 2019-05-12 PROBLEM — N20.9 URIC ACID UROLITHIASIS: Status: ACTIVE | Noted: 2019-05-12

## 2019-05-12 LAB
ALBUMIN SERPL BCP-MCNC: 3.8 G/DL (ref 3.5–5)
ALP SERPL-CCNC: 61 U/L (ref 46–116)
ALT SERPL W P-5'-P-CCNC: 42 U/L (ref 12–78)
ANION GAP SERPL CALCULATED.3IONS-SCNC: 7 MMOL/L (ref 4–13)
APTT PPP: 27 SECONDS (ref 26–38)
AST SERPL W P-5'-P-CCNC: 20 U/L (ref 5–45)
BACTERIA UR QL AUTO: ABNORMAL /HPF
BASOPHILS # BLD AUTO: 0.05 THOUSANDS/ΜL (ref 0–0.1)
BASOPHILS NFR BLD AUTO: 1 % (ref 0–1)
BILIRUB SERPL-MCNC: 0.4 MG/DL (ref 0.2–1)
BILIRUB UR QL STRIP: NEGATIVE
BUN SERPL-MCNC: 21 MG/DL (ref 5–25)
CALCIUM SERPL-MCNC: 8.8 MG/DL (ref 8.3–10.1)
CHLORIDE SERPL-SCNC: 100 MMOL/L (ref 100–108)
CLARITY UR: CLEAR
CO2 SERPL-SCNC: 29 MMOL/L (ref 21–32)
COLOR UR: YELLOW
CREAT SERPL-MCNC: 0.92 MG/DL (ref 0.6–1.3)
EOSINOPHIL # BLD AUTO: 0.36 THOUSAND/ΜL (ref 0–0.61)
EOSINOPHIL NFR BLD AUTO: 4 % (ref 0–6)
ERYTHROCYTE [DISTWIDTH] IN BLOOD BY AUTOMATED COUNT: 12.6 % (ref 11.6–15.1)
ERYTHROCYTE [SEDIMENTATION RATE] IN BLOOD: 4 MM/HOUR (ref 0–10)
GFR SERPL CREATININE-BSD FRML MDRD: 91 ML/MIN/1.73SQ M
GLUCOSE SERPL-MCNC: 91 MG/DL (ref 65–140)
GLUCOSE UR STRIP-MCNC: NEGATIVE MG/DL
HCT VFR BLD AUTO: 49 % (ref 36.5–49.3)
HGB BLD-MCNC: 15.8 G/DL (ref 12–17)
HGB UR QL STRIP.AUTO: NEGATIVE
IMM GRANULOCYTES # BLD AUTO: 0.02 THOUSAND/UL (ref 0–0.2)
IMM GRANULOCYTES NFR BLD AUTO: 0 % (ref 0–2)
INR PPP: 1.06 (ref 0.86–1.17)
KETONES UR STRIP-MCNC: NEGATIVE MG/DL
LEUKOCYTE ESTERASE UR QL STRIP: ABNORMAL
LYMPHOCYTES # BLD AUTO: 2.03 THOUSANDS/ΜL (ref 0.6–4.47)
LYMPHOCYTES NFR BLD AUTO: 22 % (ref 14–44)
MAGNESIUM SERPL-MCNC: 2 MG/DL (ref 1.6–2.6)
MCH RBC QN AUTO: 30.7 PG (ref 26.8–34.3)
MCHC RBC AUTO-ENTMCNC: 32.2 G/DL (ref 31.4–37.4)
MCV RBC AUTO: 95 FL (ref 82–98)
MONOCYTES # BLD AUTO: 0.68 THOUSAND/ΜL (ref 0.17–1.22)
MONOCYTES NFR BLD AUTO: 7 % (ref 4–12)
NEUTROPHILS # BLD AUTO: 6.11 THOUSANDS/ΜL (ref 1.85–7.62)
NEUTS SEG NFR BLD AUTO: 66 % (ref 43–75)
NITRITE UR QL STRIP: NEGATIVE
NON-SQ EPI CELLS URNS QL MICRO: ABNORMAL /HPF
NRBC BLD AUTO-RTO: 0 /100 WBCS
PH UR STRIP.AUTO: 5.5 [PH]
PLATELET # BLD AUTO: 164 THOUSANDS/UL (ref 149–390)
PMV BLD AUTO: 9.9 FL (ref 8.9–12.7)
POTASSIUM SERPL-SCNC: 3.9 MMOL/L (ref 3.5–5.3)
PROT SERPL-MCNC: 7 G/DL (ref 6.4–8.2)
PROT UR STRIP-MCNC: NEGATIVE MG/DL
PROTHROMBIN TIME: 13.2 SECONDS (ref 11.8–14.2)
RBC # BLD AUTO: 5.15 MILLION/UL (ref 3.88–5.62)
RBC #/AREA URNS AUTO: ABNORMAL /HPF
SODIUM SERPL-SCNC: 136 MMOL/L (ref 136–145)
SP GR UR STRIP.AUTO: 1.02 (ref 1–1.03)
UROBILINOGEN UR QL STRIP.AUTO: 0.2 E.U./DL
WBC # BLD AUTO: 9.25 THOUSAND/UL (ref 4.31–10.16)
WBC #/AREA URNS AUTO: ABNORMAL /HPF

## 2019-05-12 PROCEDURE — 81001 URINALYSIS AUTO W/SCOPE: CPT | Performed by: EMERGENCY MEDICINE

## 2019-05-12 PROCEDURE — 83735 ASSAY OF MAGNESIUM: CPT | Performed by: EMERGENCY MEDICINE

## 2019-05-12 PROCEDURE — 85730 THROMBOPLASTIN TIME PARTIAL: CPT | Performed by: EMERGENCY MEDICINE

## 2019-05-12 PROCEDURE — 99285 EMERGENCY DEPT VISIT HI MDM: CPT

## 2019-05-12 PROCEDURE — 96376 TX/PRO/DX INJ SAME DRUG ADON: CPT

## 2019-05-12 PROCEDURE — 96374 THER/PROPH/DIAG INJ IV PUSH: CPT

## 2019-05-12 PROCEDURE — 74176 CT ABD & PELVIS W/O CONTRAST: CPT

## 2019-05-12 PROCEDURE — 85610 PROTHROMBIN TIME: CPT | Performed by: EMERGENCY MEDICINE

## 2019-05-12 PROCEDURE — 86141 C-REACTIVE PROTEIN HS: CPT | Performed by: EMERGENCY MEDICINE

## 2019-05-12 PROCEDURE — 80053 COMPREHEN METABOLIC PANEL: CPT | Performed by: EMERGENCY MEDICINE

## 2019-05-12 PROCEDURE — 36415 COLL VENOUS BLD VENIPUNCTURE: CPT | Performed by: EMERGENCY MEDICINE

## 2019-05-12 PROCEDURE — 85652 RBC SED RATE AUTOMATED: CPT | Performed by: EMERGENCY MEDICINE

## 2019-05-12 PROCEDURE — 99285 EMERGENCY DEPT VISIT HI MDM: CPT | Performed by: EMERGENCY MEDICINE

## 2019-05-12 PROCEDURE — 85025 COMPLETE CBC W/AUTO DIFF WBC: CPT | Performed by: EMERGENCY MEDICINE

## 2019-05-12 RX ORDER — HYDROMORPHONE HCL/PF 1 MG/ML
1 SYRINGE (ML) INJECTION ONCE
Status: COMPLETED | OUTPATIENT
Start: 2019-05-12 | End: 2019-05-12

## 2019-05-12 RX ORDER — FEBUXOSTAT 40 MG/1
TABLET ORAL
Refills: 0 | COMMUNITY
Start: 2019-03-31

## 2019-05-12 RX ADMIN — HYDROMORPHONE HYDROCHLORIDE 1 MG: 1 INJECTION, SOLUTION INTRAMUSCULAR; INTRAVENOUS; SUBCUTANEOUS at 22:02

## 2019-05-12 RX ADMIN — HYDROMORPHONE HYDROCHLORIDE 1 MG: 1 INJECTION, SOLUTION INTRAMUSCULAR; INTRAVENOUS; SUBCUTANEOUS at 19:11

## 2019-05-13 ENCOUNTER — APPOINTMENT (INPATIENT)
Dept: RADIOLOGY | Facility: HOSPITAL | Age: 59
DRG: 243 | End: 2019-05-13
Payer: OTHER MISCELLANEOUS

## 2019-05-13 ENCOUNTER — HOSPITAL ENCOUNTER (INPATIENT)
Facility: HOSPITAL | Age: 59
LOS: 1 days | Discharge: HOME | DRG: 243 | End: 2019-05-13
Attending: INTERNAL MEDICINE | Admitting: INTERNAL MEDICINE
Payer: OTHER MISCELLANEOUS

## 2019-05-13 VITALS
HEART RATE: 72 BPM | DIASTOLIC BLOOD PRESSURE: 74 MMHG | RESPIRATION RATE: 18 BRPM | BODY MASS INDEX: 42.64 KG/M2 | OXYGEN SATURATION: 94 % | TEMPERATURE: 98.3 F | WEIGHT: 314.82 LBS | HEIGHT: 72 IN | SYSTOLIC BLOOD PRESSURE: 135 MMHG

## 2019-05-13 VITALS
DIASTOLIC BLOOD PRESSURE: 68 MMHG | OXYGEN SATURATION: 96 % | SYSTOLIC BLOOD PRESSURE: 129 MMHG | TEMPERATURE: 98.6 F | RESPIRATION RATE: 18 BRPM | HEART RATE: 71 BPM

## 2019-05-13 DIAGNOSIS — M54.16 LUMBAR BACK PAIN WITH RADICULOPATHY AFFECTING LEFT LOWER EXTREMITY: Primary | ICD-10-CM

## 2019-05-13 LAB
ALBUMIN SERPL BCP-MCNC: 3.7 G/DL (ref 3.5–5)
ALP SERPL-CCNC: 54 U/L (ref 46–116)
ALT SERPL W P-5'-P-CCNC: 39 U/L (ref 12–78)
ANION GAP SERPL CALCULATED.3IONS-SCNC: 5 MMOL/L (ref 4–13)
AST SERPL W P-5'-P-CCNC: 20 U/L (ref 5–45)
BASOPHILS # BLD AUTO: 0.04 THOUSANDS/ΜL (ref 0–0.1)
BASOPHILS NFR BLD AUTO: 0 % (ref 0–1)
BILIRUB SERPL-MCNC: 0.91 MG/DL (ref 0.2–1)
BUN SERPL-MCNC: 20 MG/DL (ref 5–25)
CALCIUM SERPL-MCNC: 8.7 MG/DL (ref 8.3–10.1)
CHLORIDE SERPL-SCNC: 105 MMOL/L (ref 100–108)
CO2 SERPL-SCNC: 27 MMOL/L (ref 21–32)
CREAT SERPL-MCNC: 0.84 MG/DL (ref 0.6–1.3)
CRP SERPL HS-MCNC: 2.37 MG/L
EOSINOPHIL # BLD AUTO: 0.42 THOUSAND/ΜL (ref 0–0.61)
EOSINOPHIL NFR BLD AUTO: 4 % (ref 0–6)
ERYTHROCYTE [DISTWIDTH] IN BLOOD BY AUTOMATED COUNT: 12.7 % (ref 11.6–15.1)
GFR SERPL CREATININE-BSD FRML MDRD: 97 ML/MIN/1.73SQ M
GLUCOSE SERPL-MCNC: 96 MG/DL (ref 65–140)
HCT VFR BLD AUTO: 46 % (ref 36.5–49.3)
HGB BLD-MCNC: 15.6 G/DL (ref 12–17)
IMM GRANULOCYTES # BLD AUTO: 0.04 THOUSAND/UL (ref 0–0.2)
IMM GRANULOCYTES NFR BLD AUTO: 0 % (ref 0–2)
LYMPHOCYTES # BLD AUTO: 2.19 THOUSANDS/ΜL (ref 0.6–4.47)
LYMPHOCYTES NFR BLD AUTO: 21 % (ref 14–44)
MAGNESIUM SERPL-MCNC: 2.1 MG/DL (ref 1.6–2.6)
MCH RBC QN AUTO: 30.6 PG (ref 26.8–34.3)
MCHC RBC AUTO-ENTMCNC: 33.9 G/DL (ref 31.4–37.4)
MCV RBC AUTO: 90 FL (ref 82–98)
MONOCYTES # BLD AUTO: 0.96 THOUSAND/ΜL (ref 0.17–1.22)
MONOCYTES NFR BLD AUTO: 9 % (ref 4–12)
NEUTROPHILS # BLD AUTO: 6.61 THOUSANDS/ΜL (ref 1.85–7.62)
NEUTS SEG NFR BLD AUTO: 66 % (ref 43–75)
NRBC BLD AUTO-RTO: 0 /100 WBCS
PLATELET # BLD AUTO: 178 THOUSANDS/UL (ref 149–390)
PMV BLD AUTO: 9.9 FL (ref 8.9–12.7)
POTASSIUM SERPL-SCNC: 3.9 MMOL/L (ref 3.5–5.3)
PROT SERPL-MCNC: 7.2 G/DL (ref 6.4–8.2)
RBC # BLD AUTO: 5.1 MILLION/UL (ref 3.88–5.62)
SODIUM SERPL-SCNC: 137 MMOL/L (ref 136–145)
WBC # BLD AUTO: 10.26 THOUSAND/UL (ref 4.31–10.16)

## 2019-05-13 PROCEDURE — G8978 MOBILITY CURRENT STATUS: HCPCS

## 2019-05-13 PROCEDURE — 83735 ASSAY OF MAGNESIUM: CPT | Performed by: INTERNAL MEDICINE

## 2019-05-13 PROCEDURE — 99239 HOSP IP/OBS DSCHRG MGMT >30: CPT | Performed by: NURSE PRACTITIONER

## 2019-05-13 PROCEDURE — 97163 PT EVAL HIGH COMPLEX 45 MIN: CPT

## 2019-05-13 PROCEDURE — G8987 SELF CARE CURRENT STATUS: HCPCS

## 2019-05-13 PROCEDURE — 97166 OT EVAL MOD COMPLEX 45 MIN: CPT

## 2019-05-13 PROCEDURE — G8988 SELF CARE GOAL STATUS: HCPCS

## 2019-05-13 PROCEDURE — 72148 MRI LUMBAR SPINE W/O DYE: CPT

## 2019-05-13 PROCEDURE — 96376 TX/PRO/DX INJ SAME DRUG ADON: CPT

## 2019-05-13 PROCEDURE — 99223 1ST HOSP IP/OBS HIGH 75: CPT | Performed by: INTERNAL MEDICINE

## 2019-05-13 PROCEDURE — G8989 SELF CARE D/C STATUS: HCPCS

## 2019-05-13 PROCEDURE — G8979 MOBILITY GOAL STATUS: HCPCS

## 2019-05-13 PROCEDURE — 80053 COMPREHEN METABOLIC PANEL: CPT | Performed by: INTERNAL MEDICINE

## 2019-05-13 PROCEDURE — G8980 MOBILITY D/C STATUS: HCPCS

## 2019-05-13 PROCEDURE — 85025 COMPLETE CBC W/AUTO DIFF WBC: CPT | Performed by: INTERNAL MEDICINE

## 2019-05-13 RX ORDER — METHOCARBAMOL 750 MG/1
750 TABLET, FILM COATED ORAL EVERY 6 HOURS SCHEDULED
Qty: 20 TABLET | Refills: 0 | Status: SHIPPED | OUTPATIENT
Start: 2019-05-13 | End: 2019-05-18

## 2019-05-13 RX ORDER — LIDOCAINE 50 MG/G
2 PATCH TOPICAL DAILY
Status: DISCONTINUED | OUTPATIENT
Start: 2019-05-13 | End: 2019-05-13 | Stop reason: HOSPADM

## 2019-05-13 RX ORDER — POLYETHYLENE GLYCOL 3350 17 G/17G
17 POWDER, FOR SOLUTION ORAL DAILY PRN
Status: DISCONTINUED | OUTPATIENT
Start: 2019-05-13 | End: 2019-05-13 | Stop reason: HOSPADM

## 2019-05-13 RX ORDER — HYDROMORPHONE HCL/PF 1 MG/ML
1 SYRINGE (ML) INJECTION ONCE
Status: COMPLETED | OUTPATIENT
Start: 2019-05-13 | End: 2019-05-13

## 2019-05-13 RX ORDER — IBUPROFEN 600 MG/1
600 TABLET ORAL EVERY 6 HOURS
Status: DISCONTINUED | OUTPATIENT
Start: 2019-05-13 | End: 2019-05-13 | Stop reason: HOSPADM

## 2019-05-13 RX ORDER — ACETAMINOPHEN 325 MG/1
975 TABLET ORAL EVERY 8 HOURS SCHEDULED
Qty: 30 TABLET | Refills: 0 | Status: SHIPPED | OUTPATIENT
Start: 2019-05-13 | End: 2019-05-14

## 2019-05-13 RX ORDER — PREDNISONE 10 MG/1
TABLET ORAL
Qty: 18 TABLET | Refills: 0 | Status: SHIPPED | OUTPATIENT
Start: 2019-05-13 | End: 2019-05-22

## 2019-05-13 RX ORDER — COLCHICINE 0.6 MG/1
0.6 TABLET ORAL DAILY PRN
Status: DISCONTINUED | OUTPATIENT
Start: 2019-05-13 | End: 2019-05-13 | Stop reason: HOSPADM

## 2019-05-13 RX ORDER — ACETAMINOPHEN 325 MG/1
975 TABLET ORAL EVERY 8 HOURS SCHEDULED
Status: DISCONTINUED | OUTPATIENT
Start: 2019-05-13 | End: 2019-05-13 | Stop reason: HOSPADM

## 2019-05-13 RX ORDER — GABAPENTIN 300 MG/1
300 CAPSULE ORAL
Qty: 30 CAPSULE | Refills: 0 | Status: SHIPPED | OUTPATIENT
Start: 2019-05-13

## 2019-05-13 RX ORDER — DIAZEPAM 5 MG/ML
5 INJECTION, SOLUTION INTRAMUSCULAR; INTRAVENOUS EVERY 6 HOURS PRN
Status: DISCONTINUED | OUTPATIENT
Start: 2019-05-13 | End: 2019-05-13

## 2019-05-13 RX ORDER — IBUPROFEN 600 MG/1
600 TABLET ORAL EVERY 6 HOURS
Qty: 20 TABLET | Refills: 0 | Status: SHIPPED | OUTPATIENT
Start: 2019-05-13 | End: 2019-05-19

## 2019-05-13 RX ORDER — ONDANSETRON 2 MG/ML
4 INJECTION INTRAMUSCULAR; INTRAVENOUS EVERY 6 HOURS PRN
Status: DISCONTINUED | OUTPATIENT
Start: 2019-05-13 | End: 2019-05-13 | Stop reason: HOSPADM

## 2019-05-13 RX ORDER — LORAZEPAM 2 MG/ML
1 INJECTION INTRAMUSCULAR ONCE AS NEEDED
Status: COMPLETED | OUTPATIENT
Start: 2019-05-13 | End: 2019-05-13

## 2019-05-13 RX ORDER — GABAPENTIN 300 MG/1
300 CAPSULE ORAL
Status: DISCONTINUED | OUTPATIENT
Start: 2019-05-13 | End: 2019-05-13 | Stop reason: HOSPADM

## 2019-05-13 RX ORDER — DOCUSATE SODIUM 100 MG/1
100 CAPSULE, LIQUID FILLED ORAL 2 TIMES DAILY
Status: DISCONTINUED | OUTPATIENT
Start: 2019-05-13 | End: 2019-05-13 | Stop reason: HOSPADM

## 2019-05-13 RX ORDER — MAGNESIUM HYDROXIDE/ALUMINUM HYDROXICE/SIMETHICONE 120; 1200; 1200 MG/30ML; MG/30ML; MG/30ML
30 SUSPENSION ORAL EVERY 6 HOURS PRN
Status: DISCONTINUED | OUTPATIENT
Start: 2019-05-13 | End: 2019-05-13 | Stop reason: HOSPADM

## 2019-05-13 RX ORDER — HYDROMORPHONE HCL/PF 1 MG/ML
0.5 SYRINGE (ML) INJECTION
Status: DISCONTINUED | OUTPATIENT
Start: 2019-05-13 | End: 2019-05-13

## 2019-05-13 RX ORDER — DOCUSATE SODIUM 100 MG/1
100 CAPSULE, LIQUID FILLED ORAL 2 TIMES DAILY PRN
Status: DISCONTINUED | OUTPATIENT
Start: 2019-05-13 | End: 2019-05-13

## 2019-05-13 RX ORDER — METHOCARBAMOL 750 MG/1
750 TABLET, FILM COATED ORAL EVERY 6 HOURS SCHEDULED
Status: DISCONTINUED | OUTPATIENT
Start: 2019-05-13 | End: 2019-05-13 | Stop reason: HOSPADM

## 2019-05-13 RX ORDER — COLCHICINE 0.6 MG/1
0.6 TABLET ORAL DAILY PRN
Refills: 0
Start: 2019-05-13

## 2019-05-13 RX ADMIN — ENOXAPARIN SODIUM 40 MG: 40 INJECTION SUBCUTANEOUS at 09:24

## 2019-05-13 RX ADMIN — HYDROMORPHONE HYDROCHLORIDE 1 MG: 1 INJECTION, SOLUTION INTRAMUSCULAR; INTRAVENOUS; SUBCUTANEOUS at 01:06

## 2019-05-13 RX ADMIN — IBUPROFEN 600 MG: 600 TABLET ORAL at 07:43

## 2019-05-13 RX ADMIN — DOCUSATE SODIUM 100 MG: 100 CAPSULE, LIQUID FILLED ORAL at 11:18

## 2019-05-13 RX ADMIN — METHOCARBAMOL 750 MG: 750 TABLET, FILM COATED ORAL at 16:47

## 2019-05-13 RX ADMIN — Medication 5 MG: at 07:44

## 2019-05-13 RX ADMIN — ACETAMINOPHEN 975 MG: 325 TABLET, FILM COATED ORAL at 14:06

## 2019-05-13 RX ADMIN — IBUPROFEN 600 MG: 600 TABLET ORAL at 14:06

## 2019-05-13 RX ADMIN — LIDOCAINE 2 PATCH: 50 PATCH CUTANEOUS at 09:23

## 2019-05-13 RX ADMIN — LORAZEPAM 1 MG: 2 INJECTION INTRAMUSCULAR; INTRAVENOUS at 03:20

## 2019-10-02 ENCOUNTER — APPOINTMENT (OUTPATIENT)
Dept: LAB | Facility: MEDICAL CENTER | Age: 59
End: 2019-10-02
Payer: COMMERCIAL

## 2019-10-02 ENCOUNTER — TRANSCRIBE ORDERS (OUTPATIENT)
Dept: LAB | Facility: MEDICAL CENTER | Age: 59
End: 2019-10-02

## 2019-10-02 DIAGNOSIS — R06.02 SOB (SHORTNESS OF BREATH): ICD-10-CM

## 2019-10-02 DIAGNOSIS — R53.83 OTHER FATIGUE: ICD-10-CM

## 2019-10-02 DIAGNOSIS — R63.5 WEIGHT GAIN: ICD-10-CM

## 2019-10-02 DIAGNOSIS — R63.5 WEIGHT GAIN: Primary | ICD-10-CM

## 2019-10-02 LAB
ANION GAP SERPL CALCULATED.3IONS-SCNC: 7 MMOL/L (ref 4–13)
BUN SERPL-MCNC: 21 MG/DL (ref 5–25)
CALCIUM SERPL-MCNC: 9.3 MG/DL (ref 8.3–10.1)
CHLORIDE SERPL-SCNC: 109 MMOL/L (ref 100–108)
CO2 SERPL-SCNC: 25 MMOL/L (ref 21–32)
CREAT SERPL-MCNC: 1.17 MG/DL (ref 0.6–1.3)
GFR SERPL CREATININE-BSD FRML MDRD: 68 ML/MIN/1.73SQ M
GLUCOSE SERPL-MCNC: 130 MG/DL (ref 65–140)
NT-PROBNP SERPL-MCNC: 82 PG/ML
POTASSIUM SERPL-SCNC: 4.5 MMOL/L (ref 3.5–5.3)
SODIUM SERPL-SCNC: 141 MMOL/L (ref 136–145)
T4 FREE SERPL-MCNC: 0.94 NG/DL (ref 0.76–1.46)
TSH SERPL DL<=0.05 MIU/L-ACNC: 3.82 UIU/ML (ref 0.36–3.74)

## 2019-10-02 PROCEDURE — 36415 COLL VENOUS BLD VENIPUNCTURE: CPT

## 2019-10-02 PROCEDURE — 80048 BASIC METABOLIC PNL TOTAL CA: CPT

## 2019-10-02 PROCEDURE — 83880 ASSAY OF NATRIURETIC PEPTIDE: CPT

## 2019-10-02 PROCEDURE — 84439 ASSAY OF FREE THYROXINE: CPT

## 2019-10-02 PROCEDURE — 84443 ASSAY THYROID STIM HORMONE: CPT

## 2019-12-23 ENCOUNTER — APPOINTMENT (OUTPATIENT)
Dept: LAB | Facility: MEDICAL CENTER | Age: 59
End: 2019-12-23
Payer: COMMERCIAL

## 2019-12-23 ENCOUNTER — TRANSCRIBE ORDERS (OUTPATIENT)
Dept: LAB | Facility: MEDICAL CENTER | Age: 59
End: 2019-12-23

## 2019-12-23 DIAGNOSIS — E07.9 THYROID DYSFUNCTION: Primary | ICD-10-CM

## 2019-12-23 DIAGNOSIS — E07.9 THYROID DYSFUNCTION: ICD-10-CM

## 2019-12-23 LAB
T4 FREE SERPL-MCNC: 0.97 NG/DL (ref 0.76–1.46)
TSH SERPL DL<=0.05 MIU/L-ACNC: 1.38 UIU/ML (ref 0.36–3.74)

## 2019-12-23 PROCEDURE — 36415 COLL VENOUS BLD VENIPUNCTURE: CPT

## 2019-12-23 PROCEDURE — 84439 ASSAY OF FREE THYROXINE: CPT

## 2019-12-23 PROCEDURE — 84443 ASSAY THYROID STIM HORMONE: CPT

## 2020-02-22 ENCOUNTER — APPOINTMENT (OUTPATIENT)
Dept: LAB | Facility: HOSPITAL | Age: 60
End: 2020-02-22
Payer: COMMERCIAL

## 2020-02-22 ENCOUNTER — TRANSCRIBE ORDERS (OUTPATIENT)
Dept: ADMINISTRATIVE | Facility: HOSPITAL | Age: 60
End: 2020-02-22

## 2020-02-22 DIAGNOSIS — R53.83 FATIGUE, UNSPECIFIED TYPE: ICD-10-CM

## 2020-02-22 DIAGNOSIS — Z13.9 SCREENING FOR CONDITION: Primary | ICD-10-CM

## 2020-02-22 DIAGNOSIS — R73.9 HYPERGLYCEMIA: ICD-10-CM

## 2020-02-22 DIAGNOSIS — Z13.9 SCREENING FOR CONDITION: ICD-10-CM

## 2020-02-22 LAB
25(OH)D3 SERPL-MCNC: 58 NG/ML (ref 30–100)
ALBUMIN SERPL BCP-MCNC: 3.8 G/DL (ref 3.5–5)
ALP SERPL-CCNC: 68 U/L (ref 46–116)
ALT SERPL W P-5'-P-CCNC: 41 U/L (ref 12–78)
ANION GAP SERPL CALCULATED.3IONS-SCNC: 7 MMOL/L (ref 4–13)
AST SERPL W P-5'-P-CCNC: 23 U/L (ref 5–45)
BASOPHILS # BLD AUTO: 0.04 THOUSANDS/ΜL (ref 0–0.1)
BASOPHILS NFR BLD AUTO: 1 % (ref 0–1)
BILIRUB SERPL-MCNC: 0.6 MG/DL (ref 0.2–1)
BUN SERPL-MCNC: 17 MG/DL (ref 5–25)
CALCIUM SERPL-MCNC: 8.9 MG/DL (ref 8.3–10.1)
CHLORIDE SERPL-SCNC: 105 MMOL/L (ref 100–108)
CHOLEST SERPL-MCNC: 153 MG/DL (ref 50–200)
CO2 SERPL-SCNC: 28 MMOL/L (ref 21–32)
CREAT SERPL-MCNC: 0.81 MG/DL (ref 0.6–1.3)
CRP SERPL QL: 6.2 MG/L
EOSINOPHIL # BLD AUTO: 0.25 THOUSAND/ΜL (ref 0–0.61)
EOSINOPHIL NFR BLD AUTO: 4 % (ref 0–6)
ERYTHROCYTE [DISTWIDTH] IN BLOOD BY AUTOMATED COUNT: 12.2 % (ref 11.6–15.1)
ERYTHROCYTE [SEDIMENTATION RATE] IN BLOOD: 11 MM/HOUR (ref 0–10)
EST. AVERAGE GLUCOSE BLD GHB EST-MCNC: 108 MG/DL
GFR SERPL CREATININE-BSD FRML MDRD: 97 ML/MIN/1.73SQ M
GLUCOSE P FAST SERPL-MCNC: 97 MG/DL (ref 65–99)
HBA1C MFR BLD: 5.4 %
HCT VFR BLD AUTO: 45.2 % (ref 36.5–49.3)
HDLC SERPL-MCNC: 36 MG/DL
HGB BLD-MCNC: 15.1 G/DL (ref 12–17)
IMM GRANULOCYTES # BLD AUTO: 0.02 THOUSAND/UL (ref 0–0.2)
IMM GRANULOCYTES NFR BLD AUTO: 0 % (ref 0–2)
LDLC SERPL CALC-MCNC: 96 MG/DL (ref 0–100)
LYMPHOCYTES # BLD AUTO: 2.33 THOUSANDS/ΜL (ref 0.6–4.47)
LYMPHOCYTES NFR BLD AUTO: 35 % (ref 14–44)
MCH RBC QN AUTO: 30.9 PG (ref 26.8–34.3)
MCHC RBC AUTO-ENTMCNC: 33.4 G/DL (ref 31.4–37.4)
MCV RBC AUTO: 93 FL (ref 82–98)
MONOCYTES # BLD AUTO: 0.68 THOUSAND/ΜL (ref 0.17–1.22)
MONOCYTES NFR BLD AUTO: 10 % (ref 4–12)
NEUTROPHILS # BLD AUTO: 3.35 THOUSANDS/ΜL (ref 1.85–7.62)
NEUTS SEG NFR BLD AUTO: 50 % (ref 43–75)
NONHDLC SERPL-MCNC: 117 MG/DL
NRBC BLD AUTO-RTO: 0 /100 WBCS
PLATELET # BLD AUTO: 205 THOUSANDS/UL (ref 149–390)
PMV BLD AUTO: 9.6 FL (ref 8.9–12.7)
POTASSIUM SERPL-SCNC: 4.4 MMOL/L (ref 3.5–5.3)
PROT SERPL-MCNC: 6.9 G/DL (ref 6.4–8.2)
PSA SERPL-MCNC: 0.5 NG/ML (ref 0–4)
RBC # BLD AUTO: 4.88 MILLION/UL (ref 3.88–5.62)
SODIUM SERPL-SCNC: 140 MMOL/L (ref 136–145)
T4 FREE SERPL-MCNC: 1.08 NG/DL (ref 0.76–1.46)
TRIGL SERPL-MCNC: 103 MG/DL
TSH SERPL DL<=0.05 MIU/L-ACNC: 1.37 UIU/ML (ref 0.36–3.74)
URATE SERPL-MCNC: 5.7 MG/DL (ref 4.2–8)
WBC # BLD AUTO: 6.67 THOUSAND/UL (ref 4.31–10.16)

## 2020-02-22 PROCEDURE — 84443 ASSAY THYROID STIM HORMONE: CPT

## 2020-02-22 PROCEDURE — G0103 PSA SCREENING: HCPCS

## 2020-02-22 PROCEDURE — 86140 C-REACTIVE PROTEIN: CPT

## 2020-02-22 PROCEDURE — 82306 VITAMIN D 25 HYDROXY: CPT

## 2020-02-22 PROCEDURE — 80061 LIPID PANEL: CPT

## 2020-02-22 PROCEDURE — 36415 COLL VENOUS BLD VENIPUNCTURE: CPT

## 2020-02-22 PROCEDURE — 84550 ASSAY OF BLOOD/URIC ACID: CPT

## 2020-02-22 PROCEDURE — 85025 COMPLETE CBC W/AUTO DIFF WBC: CPT

## 2020-02-22 PROCEDURE — 83036 HEMOGLOBIN GLYCOSYLATED A1C: CPT

## 2020-02-22 PROCEDURE — 80053 COMPREHEN METABOLIC PANEL: CPT

## 2020-02-22 PROCEDURE — 84439 ASSAY OF FREE THYROXINE: CPT

## 2020-02-22 PROCEDURE — 85652 RBC SED RATE AUTOMATED: CPT

## 2020-04-30 ENCOUNTER — TRANSCRIBE ORDERS (OUTPATIENT)
Dept: ADMINISTRATIVE | Facility: HOSPITAL | Age: 60
End: 2020-04-30

## 2020-04-30 DIAGNOSIS — E66.3 OVERWEIGHT: Primary | ICD-10-CM

## 2020-05-29 ENCOUNTER — CLINICAL SUPPORT (OUTPATIENT)
Dept: NUTRITION | Facility: HOSPITAL | Age: 60
End: 2020-05-29
Payer: COMMERCIAL

## 2020-05-29 VITALS — HEIGHT: 72 IN | BODY MASS INDEX: 42.66 KG/M2 | WEIGHT: 315 LBS

## 2020-05-29 DIAGNOSIS — E66.01 MORBID OBESITY DUE TO EXCESS CALORIES (HCC): ICD-10-CM

## 2020-05-29 PROCEDURE — 97802 MEDICAL NUTRITION INDIV IN: CPT

## 2020-06-29 ENCOUNTER — CLINICAL SUPPORT (OUTPATIENT)
Dept: NUTRITION | Facility: HOSPITAL | Age: 60
End: 2020-06-29
Payer: COMMERCIAL

## 2020-06-29 VITALS — BODY MASS INDEX: 42.17 KG/M2 | WEIGHT: 310.9 LBS

## 2020-06-29 DIAGNOSIS — E66.3 SEVERELY OVERWEIGHT: Primary | ICD-10-CM

## 2020-06-29 PROCEDURE — 97803 MED NUTRITION INDIV SUBSEQ: CPT

## 2020-09-14 ENCOUNTER — CLINICAL SUPPORT (OUTPATIENT)
Dept: NUTRITION | Facility: HOSPITAL | Age: 60
End: 2020-09-14
Payer: COMMERCIAL

## 2020-09-14 VITALS — WEIGHT: 299.4 LBS | BODY MASS INDEX: 40.61 KG/M2

## 2020-09-14 DIAGNOSIS — E66.3 SEVERELY OVERWEIGHT: Primary | ICD-10-CM

## 2020-09-14 PROCEDURE — 97803 MED NUTRITION INDIV SUBSEQ: CPT
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
## 2020-12-04 DIAGNOSIS — Z20.828 EXPOSURE TO SARS-ASSOCIATED CORONAVIRUS: ICD-10-CM

## 2020-12-04 PROCEDURE — U0003 INFECTIOUS AGENT DETECTION BY NUCLEIC ACID (DNA OR RNA); SEVERE ACUTE RESPIRATORY SYNDROME CORONAVIRUS 2 (SARS-COV-2) (CORONAVIRUS DISEASE [COVID-19]), AMPLIFIED PROBE TECHNIQUE, MAKING USE OF HIGH THROUGHPUT TECHNOLOGIES AS DESCRIBED BY CMS-2020-01-R: HCPCS | Performed by: NURSE PRACTITIONER

## 2020-12-05 LAB — SARS-COV-2 RNA SPEC QL NAA+PROBE: NOT DETECTED

## 2021-02-16 ENCOUNTER — TRANSCRIBE ORDERS (OUTPATIENT)
Dept: ADMINISTRATIVE | Facility: HOSPITAL | Age: 61
End: 2021-02-16

## 2021-02-16 DIAGNOSIS — N63.20 LEFT BREAST MASS: Primary | ICD-10-CM

## 2021-03-11 ENCOUNTER — HOSPITAL ENCOUNTER (OUTPATIENT)
Dept: ULTRASOUND IMAGING | Facility: HOSPITAL | Age: 61
Discharge: HOME | End: 2021-03-11
Payer: COMMERCIAL

## 2021-03-11 ENCOUNTER — HOSPITAL ENCOUNTER (OUTPATIENT)
Dept: MAMMOGRAPHY | Facility: HOSPITAL | Age: 61
Discharge: HOME | End: 2021-03-11
Payer: COMMERCIAL

## 2021-03-11 VITALS — HEIGHT: 72 IN | WEIGHT: 299 LBS | BODY MASS INDEX: 40.5 KG/M2

## 2021-03-11 DIAGNOSIS — N63.20 LEFT BREAST MASS: ICD-10-CM

## 2021-03-11 PROCEDURE — G0279 TOMOSYNTHESIS, MAMMO: HCPCS

## 2021-03-11 PROCEDURE — 77066 DX MAMMO INCL CAD BI: CPT

## 2021-03-31 ENCOUNTER — IMMUNIZATIONS (OUTPATIENT)
Dept: FAMILY MEDICINE CLINIC | Facility: HOSPITAL | Age: 61
End: 2021-03-31

## 2021-03-31 DIAGNOSIS — Z23 ENCOUNTER FOR IMMUNIZATION: Primary | ICD-10-CM

## 2021-03-31 PROCEDURE — 0011A SARS-COV-2 / COVID-19 MRNA VACCINE (MODERNA) 100 MCG: CPT

## 2021-03-31 PROCEDURE — 91301 SARS-COV-2 / COVID-19 MRNA VACCINE (MODERNA) 100 MCG: CPT

## 2021-04-29 ENCOUNTER — IMMUNIZATIONS (OUTPATIENT)
Dept: FAMILY MEDICINE CLINIC | Facility: HOSPITAL | Age: 61
End: 2021-04-29

## 2021-04-29 DIAGNOSIS — Z23 ENCOUNTER FOR IMMUNIZATION: Primary | ICD-10-CM

## 2021-04-29 PROCEDURE — 0012A SARS-COV-2 / COVID-19 MRNA VACCINE (MODERNA) 100 MCG: CPT

## 2021-04-29 PROCEDURE — 91301 SARS-COV-2 / COVID-19 MRNA VACCINE (MODERNA) 100 MCG: CPT

## 2022-05-06 ENCOUNTER — APPOINTMENT (OUTPATIENT)
Dept: LAB | Facility: HOSPITAL | Age: 62
End: 2022-05-06
Payer: COMMERCIAL

## 2022-05-06 DIAGNOSIS — M25.50 PAIN IN JOINT, MULTIPLE SITES: ICD-10-CM

## 2022-05-06 DIAGNOSIS — R73.01 IMPAIRED FASTING GLUCOSE: ICD-10-CM

## 2022-05-06 DIAGNOSIS — Z12.5 SPECIAL SCREENING FOR MALIGNANT NEOPLASM OF PROSTATE: ICD-10-CM

## 2022-05-06 LAB
25(OH)D3 SERPL-MCNC: 62.2 NG/ML (ref 30–100)
ALBUMIN SERPL BCP-MCNC: 4 G/DL (ref 3.5–5)
ALP SERPL-CCNC: 74 U/L (ref 46–116)
ALT SERPL W P-5'-P-CCNC: 44 U/L (ref 12–78)
ANION GAP SERPL CALCULATED.3IONS-SCNC: 9 MMOL/L (ref 4–13)
AST SERPL W P-5'-P-CCNC: 24 U/L (ref 5–45)
BASOPHILS # BLD AUTO: 0.03 THOUSANDS/ΜL (ref 0–0.1)
BASOPHILS NFR BLD AUTO: 1 % (ref 0–1)
BILIRUB SERPL-MCNC: 0.74 MG/DL (ref 0.2–1)
BUN SERPL-MCNC: 18 MG/DL (ref 5–25)
CALCIUM SERPL-MCNC: 9.1 MG/DL (ref 8.3–10.1)
CHLORIDE SERPL-SCNC: 104 MMOL/L (ref 100–108)
CHOLEST SERPL-MCNC: 174 MG/DL
CO2 SERPL-SCNC: 27 MMOL/L (ref 21–32)
CREAT SERPL-MCNC: 1.11 MG/DL (ref 0.6–1.3)
CRP SERPL QL: 3.5 MG/L
EOSINOPHIL # BLD AUTO: 0.17 THOUSAND/ΜL (ref 0–0.61)
EOSINOPHIL NFR BLD AUTO: 3 % (ref 0–6)
ERYTHROCYTE [DISTWIDTH] IN BLOOD BY AUTOMATED COUNT: 12.3 % (ref 11.6–15.1)
ERYTHROCYTE [SEDIMENTATION RATE] IN BLOOD: 9 MM/HOUR (ref 0–19)
EST. AVERAGE GLUCOSE BLD GHB EST-MCNC: 114 MG/DL
GFR SERPL CREATININE-BSD FRML MDRD: 71 ML/MIN/1.73SQ M
GLUCOSE P FAST SERPL-MCNC: 92 MG/DL (ref 65–99)
HBA1C MFR BLD: 5.6 %
HCT VFR BLD AUTO: 46.6 % (ref 36.5–49.3)
HDLC SERPL-MCNC: 44 MG/DL
HGB BLD-MCNC: 15.4 G/DL (ref 12–17)
IMM GRANULOCYTES # BLD AUTO: 0.01 THOUSAND/UL (ref 0–0.2)
IMM GRANULOCYTES NFR BLD AUTO: 0 % (ref 0–2)
LDLC SERPL CALC-MCNC: 108 MG/DL (ref 0–100)
LYMPHOCYTES # BLD AUTO: 2.01 THOUSANDS/ΜL (ref 0.6–4.47)
LYMPHOCYTES NFR BLD AUTO: 31 % (ref 14–44)
MCH RBC QN AUTO: 29.3 PG (ref 26.8–34.3)
MCHC RBC AUTO-ENTMCNC: 33 G/DL (ref 31.4–37.4)
MCV RBC AUTO: 89 FL (ref 82–98)
MONOCYTES # BLD AUTO: 0.71 THOUSAND/ΜL (ref 0.17–1.22)
MONOCYTES NFR BLD AUTO: 11 % (ref 4–12)
NEUTROPHILS # BLD AUTO: 3.63 THOUSANDS/ΜL (ref 1.85–7.62)
NEUTS SEG NFR BLD AUTO: 54 % (ref 43–75)
NONHDLC SERPL-MCNC: 130 MG/DL
NRBC BLD AUTO-RTO: 0 /100 WBCS
PLATELET # BLD AUTO: 236 THOUSANDS/UL (ref 149–390)
PMV BLD AUTO: 9.5 FL (ref 8.9–12.7)
POTASSIUM SERPL-SCNC: 4.2 MMOL/L (ref 3.5–5.3)
PROT SERPL-MCNC: 7.9 G/DL (ref 6.4–8.2)
PSA SERPL-MCNC: 0.6 NG/ML (ref 0–4)
RBC # BLD AUTO: 5.25 MILLION/UL (ref 3.88–5.62)
SODIUM SERPL-SCNC: 140 MMOL/L (ref 136–145)
T4 FREE SERPL-MCNC: 0.97 NG/DL (ref 0.76–1.46)
TRIGL SERPL-MCNC: 111 MG/DL
TSH SERPL DL<=0.05 MIU/L-ACNC: 1.46 UIU/ML (ref 0.45–4.5)
URATE SERPL-MCNC: 7.3 MG/DL (ref 4.2–8)
WBC # BLD AUTO: 6.56 THOUSAND/UL (ref 4.31–10.16)

## 2022-05-06 PROCEDURE — 80053 COMPREHEN METABOLIC PANEL: CPT

## 2022-05-06 PROCEDURE — 36415 COLL VENOUS BLD VENIPUNCTURE: CPT

## 2022-05-06 PROCEDURE — 86430 RHEUMATOID FACTOR TEST QUAL: CPT

## 2022-05-06 PROCEDURE — 86038 ANTINUCLEAR ANTIBODIES: CPT

## 2022-05-06 PROCEDURE — 86618 LYME DISEASE ANTIBODY: CPT

## 2022-05-06 PROCEDURE — 86140 C-REACTIVE PROTEIN: CPT

## 2022-05-06 PROCEDURE — 84550 ASSAY OF BLOOD/URIC ACID: CPT

## 2022-05-06 PROCEDURE — 83036 HEMOGLOBIN GLYCOSYLATED A1C: CPT

## 2022-05-06 PROCEDURE — 84439 ASSAY OF FREE THYROXINE: CPT

## 2022-05-06 PROCEDURE — 84443 ASSAY THYROID STIM HORMONE: CPT

## 2022-05-06 PROCEDURE — 82306 VITAMIN D 25 HYDROXY: CPT

## 2022-05-06 PROCEDURE — G0103 PSA SCREENING: HCPCS

## 2022-05-06 PROCEDURE — 85025 COMPLETE CBC W/AUTO DIFF WBC: CPT

## 2022-05-06 PROCEDURE — 85652 RBC SED RATE AUTOMATED: CPT

## 2022-05-06 PROCEDURE — 80061 LIPID PANEL: CPT

## 2022-05-07 LAB
ANA HOMOGEN TITR SER: NORMAL {TITER}
ANA TITR SER IF: POSITIVE {TITER}
SL AMB NOTE:: NORMAL

## 2022-05-08 LAB — B BURGDOR IGG+IGM SER-ACNC: 13

## 2022-05-09 LAB — RHEUMATOID FACT SER QL LA: NEGATIVE

## 2022-08-12 ENCOUNTER — APPOINTMENT (OUTPATIENT)
Dept: LAB | Facility: HOSPITAL | Age: 62
End: 2022-08-12
Payer: COMMERCIAL

## 2022-08-12 DIAGNOSIS — R73.01 ELEVATED FASTING BLOOD SUGAR: ICD-10-CM

## 2022-08-12 PROCEDURE — 36415 COLL VENOUS BLD VENIPUNCTURE: CPT

## 2022-08-12 PROCEDURE — 86038 ANTINUCLEAR ANTIBODIES: CPT

## 2022-08-15 LAB — RYE IGE QN: NEGATIVE

## 2022-08-25 RX ORDER — LIDOCAINE HYDROCHLORIDE 10 MG/ML
0.5 INJECTION, SOLUTION EPIDURAL; INFILTRATION; INTRACAUDAL; PERINEURAL ONCE AS NEEDED
Status: CANCELLED | OUTPATIENT
Start: 2022-08-25

## 2022-08-25 RX ORDER — SODIUM CHLORIDE, SODIUM LACTATE, POTASSIUM CHLORIDE, CALCIUM CHLORIDE 600; 310; 30; 20 MG/100ML; MG/100ML; MG/100ML; MG/100ML
125 INJECTION, SOLUTION INTRAVENOUS CONTINUOUS
Status: CANCELLED | OUTPATIENT
Start: 2022-08-25

## 2022-08-26 ENCOUNTER — HOSPITAL ENCOUNTER (OUTPATIENT)
Dept: GASTROENTEROLOGY | Facility: HOSPITAL | Age: 62
Setting detail: OUTPATIENT SURGERY
End: 2022-08-26
Attending: COLON & RECTAL SURGERY
Payer: COMMERCIAL

## 2022-08-26 ENCOUNTER — ANESTHESIA EVENT (OUTPATIENT)
Dept: GASTROENTEROLOGY | Facility: HOSPITAL | Age: 62
End: 2022-08-26

## 2022-08-26 ENCOUNTER — ANESTHESIA (OUTPATIENT)
Dept: GASTROENTEROLOGY | Facility: HOSPITAL | Age: 62
End: 2022-08-26

## 2022-08-26 VITALS
WEIGHT: 300 LBS | OXYGEN SATURATION: 95 % | BODY MASS INDEX: 39.76 KG/M2 | RESPIRATION RATE: 20 BRPM | SYSTOLIC BLOOD PRESSURE: 109 MMHG | HEIGHT: 73 IN | DIASTOLIC BLOOD PRESSURE: 64 MMHG | HEART RATE: 61 BPM | TEMPERATURE: 97.2 F

## 2022-08-26 DIAGNOSIS — Z80.0 FAMILY HISTORY OF MALIGNANT NEOPLASM OF DIGESTIVE ORGANS: ICD-10-CM

## 2022-08-26 DIAGNOSIS — Z12.11 ENCOUNTER FOR SCREENING FOR MALIGNANT NEOPLASM OF COLON: ICD-10-CM

## 2022-08-26 PROCEDURE — 88305 TISSUE EXAM BY PATHOLOGIST: CPT | Performed by: PATHOLOGY

## 2022-08-26 RX ORDER — SODIUM CHLORIDE, SODIUM LACTATE, POTASSIUM CHLORIDE, CALCIUM CHLORIDE 600; 310; 30; 20 MG/100ML; MG/100ML; MG/100ML; MG/100ML
125 INJECTION, SOLUTION INTRAVENOUS CONTINUOUS
Status: DISCONTINUED | OUTPATIENT
Start: 2022-08-26 | End: 2022-08-30 | Stop reason: HOSPADM

## 2022-08-26 RX ORDER — LIDOCAINE HYDROCHLORIDE 10 MG/ML
0.5 INJECTION, SOLUTION EPIDURAL; INFILTRATION; INTRACAUDAL; PERINEURAL ONCE AS NEEDED
Status: DISCONTINUED | OUTPATIENT
Start: 2022-08-26 | End: 2022-08-30 | Stop reason: HOSPADM

## 2022-08-26 RX ORDER — PROPOFOL 10 MG/ML
INJECTION, EMULSION INTRAVENOUS AS NEEDED
Status: DISCONTINUED | OUTPATIENT
Start: 2022-08-26 | End: 2022-08-26

## 2022-08-26 RX ADMIN — SODIUM CHLORIDE, SODIUM LACTATE, POTASSIUM CHLORIDE, AND CALCIUM CHLORIDE: .6; .31; .03; .02 INJECTION, SOLUTION INTRAVENOUS at 10:14

## 2022-08-26 RX ADMIN — PROPOFOL 50 MG: 10 INJECTION, EMULSION INTRAVENOUS at 11:41

## 2022-08-26 RX ADMIN — PROPOFOL 50 MG: 10 INJECTION, EMULSION INTRAVENOUS at 11:45

## 2022-08-26 RX ADMIN — PROPOFOL 100 MG: 10 INJECTION, EMULSION INTRAVENOUS at 11:33

## 2022-08-26 RX ADMIN — PROPOFOL 50 MG: 10 INJECTION, EMULSION INTRAVENOUS at 11:49

## 2022-08-26 NOTE — ANESTHESIA PREPROCEDURE EVALUATION
Procedure:  COLONOSCOPY    At risk for obesity related JOSE    Relevant Problems   MUSCULOSKELETAL   (+) Degenerative disc disease at L5-S1 level        Physical Exam    Airway    Mallampati score: III  TM Distance: >3 FB  Neck ROM: full     Dental   No notable dental hx     Cardiovascular  Rhythm: regular, Rate: normal, Cardiovascular exam normal    Pulmonary  Pulmonary exam normal Breath sounds clear to auscultation,     Other Findings        Anesthesia Plan  ASA Score- 3     Anesthesia Type- IV sedation with anesthesia with ASA Monitors  Additional Monitors:   Airway Plan:     Comment: Discussed risks/benefits, including medication reactions, awareness, aspiration, and serious/life threatening complications  Plan to maintain native airway with IVGA, monitored with EtCO2  Plan Factors-Exercise tolerance (METS): >4 METS  Patient summary reviewed  Patient instructed to abstain from smoking on day of procedure  Patient did not smoke on day of surgery  Induction- intravenous  Postoperative Plan-     Informed Consent- Anesthetic plan and risks discussed with patient  I personally reviewed this patient with the CRNA  Discussed and agreed on the Anesthesia Plan with the CRNA  Gracia Day

## 2022-08-26 NOTE — NURSING NOTE
Patient tolerating po food/fluids  Patient verbalized understanding of discharge instructions given by Viola Sevilla RN

## 2022-08-26 NOTE — DISCHARGE INSTRUCTIONS
[facility] [address] [address], [address] [place] [address] [address]  Phone: [phone]    DISCHARGE INSTRUCTIONS:    1   ___ Complete Exam - Normal    2   ___ Exam normal, but entire colon not seen  We will discuss this with you  3   ___ Polyp(s) removed by "burning" - NO pathology report will follow    4   __3_ Polyp(s) removed by excision  Pathology report will be available in 4-5 days   Someone from our office will call you with results  5   ___ Exam prompted biopsies  Pathology report will be available in 4-5 days   Someone from our office will call you with results  6   ___ Exam demonstrated findings that need treatment  Prescriptions will be   Given to you  Return to our office in ____ weeks  Please call for appt  7   ___ Original office visit or colonoscopy findings necessitate an office visit  Please call to set up a new appointment    8   ___ Medication  __________________________________________        [address]:    - Go straight home and rest today    - No driving or drinking alcohol for 24 hours    - Resume regular diet and medications unless otherwise instructed  Coumadin and Plavix are blood thinners  You can resume these medications on __________      IF YOU ARE HAVING ANY FEVER, BLEEDING OR PERSISTENT PAIN IN THE ABDOMEN, PLEASE CALL OUR OFFICE ANY DAY OR TIME  [phone]

## 2022-08-26 NOTE — ANESTHESIA POSTPROCEDURE EVALUATION
Post-Op Assessment Note    CV Status:  Stable  Pain Score: 0    Pain management: adequate     Mental Status:  Alert   Hydration Status:  Stable   PONV Controlled:  Controlled   Airway Patency:  Patent      Post Op Vitals Reviewed: Yes      Staff: CRNA         No complications documented      BP   113/67   Temp     Pulse  61   Resp   20   SpO2   98

## 2022-08-26 NOTE — INTERVAL H&P NOTE
H&P reviewed  After examining the patient I find no changes in the patients condition since the H&P had been written      Vitals:    08/26/22 0931   BP: 131/70   Pulse: 70   Resp: 16   Temp: (!) 97 4 °F (36 3 °C)   SpO2: 96%

## 2022-10-23 ENCOUNTER — HOSPITAL ENCOUNTER (EMERGENCY)
Facility: HOSPITAL | Age: 62
Discharge: HOME | End: 2022-10-23
Attending: EMERGENCY MEDICINE
Payer: COMMERCIAL

## 2022-10-23 VITALS
DIASTOLIC BLOOD PRESSURE: 98 MMHG | HEART RATE: 74 BPM | SYSTOLIC BLOOD PRESSURE: 170 MMHG | RESPIRATION RATE: 18 BRPM | TEMPERATURE: 98.2 F | OXYGEN SATURATION: 97 %

## 2022-10-23 DIAGNOSIS — M54.42 ACUTE LEFT-SIDED LOW BACK PAIN WITH LEFT-SIDED SCIATICA: Primary | ICD-10-CM

## 2022-10-23 PROCEDURE — 99284 EMERGENCY DEPT VISIT MOD MDM: CPT | Performed by: EMERGENCY MEDICINE

## 2022-10-23 RX ORDER — METHOCARBAMOL 500 MG/1
500 TABLET, FILM COATED ORAL
Qty: 20 TABLET | Refills: 0 | Status: ON HOLD | OUTPATIENT
Start: 2022-10-23 | End: 2022-10-24

## 2022-10-23 RX ORDER — LIDOCAINE 50 MG/G
1 PATCH TOPICAL ONCE
Status: DISCONTINUED | OUTPATIENT
Start: 2022-10-23 | End: 2022-10-23 | Stop reason: HOSPADM

## 2022-10-23 RX ORDER — METHOCARBAMOL 500 MG/1
1000 TABLET, FILM COATED ORAL ONCE
Status: COMPLETED | OUTPATIENT
Start: 2022-10-23 | End: 2022-10-23

## 2022-10-23 RX ORDER — ACETAMINOPHEN 325 MG/1
975 TABLET ORAL ONCE
Status: COMPLETED | OUTPATIENT
Start: 2022-10-23 | End: 2022-10-23

## 2022-10-23 RX ORDER — KETOROLAC TROMETHAMINE 30 MG/ML
15 INJECTION, SOLUTION INTRAMUSCULAR; INTRAVENOUS ONCE
Status: COMPLETED | OUTPATIENT
Start: 2022-10-23 | End: 2022-10-23

## 2022-10-23 RX ADMIN — METHOCARBAMOL 1000 MG: 500 TABLET ORAL at 02:25

## 2022-10-23 RX ADMIN — LIDOCAINE 5% 1 PATCH: 700 PATCH TOPICAL at 02:25

## 2022-10-23 RX ADMIN — ACETAMINOPHEN 975 MG: 325 TABLET ORAL at 02:25

## 2022-10-23 RX ADMIN — KETOROLAC TROMETHAMINE 15 MG: 30 INJECTION, SOLUTION INTRAMUSCULAR at 02:26

## 2022-10-23 NOTE — Clinical Note
Frances Yarbrough was seen and treated in our emergency department on 10/23/2022  No restrictions            Diagnosis: lumbar radiculopathy    Maurice DANGELO    He may return on this date: 10/24/2022         If you have any questions or concerns, please don't hesitate to call        Ria Hameed DO    ______________________________           _______________          _______________  Hospital Representative                              Date                                Time

## 2022-10-23 NOTE — ED PROVIDER NOTES
History  Chief Complaint   Patient presents with   • Leg Pain     Patient has had lower left sided back pain for three weeks had an injection in his back a week ago but lately the pain has been going down the left leg     Rukhsana Gan is a 58y o  year old male with PMH of gout, low back pain, BCC presenting to the [facility] ED for back pain  Patient has had three weeks of intermittent left side low back pain  Pain acutely worsening this evening  Rated as 5/10 at present and radiating from the left low back down the left leg into the posterior calf  The patient felt a tingling sensation into his leg as well  Able to ambulate though this worsens the pain  Patient has history of lumbar radiculopathy previously diagnosed on MRI  Patient denies associated weakness in the legs  The patient has taken tramadol at home for symptomatic treatment  Patient denies fevers, chest pain, dyspnea, cough, abdominal pain,   Patient denies bowel/bladder incontinence, saddle anesthesia  Patient denies recent trauma to the area  Denies unexplained fever/night sweats, weight loss, neurologic symptoms, urinary retention  No history of bacteremia  Denies Hx of IVDU  No history of chronic steroid nor hx of cancer (prostate, renal, lung)  History of basal cell carcinoma which was excised and in remission  History provided by:  Patient and medical records   used: No    Leg Pain  Associated symptoms: back pain    Associated symptoms: no fatigue and no fever        Prior to Admission Medications   Prescriptions Last Dose Informant Patient Reported? Taking?    Ascorbic Acid (vitamin C) 1000 MG tablet 10/22/2022 at Unknown time Self Yes Yes   Sig: Take 1,000 mg by mouth daily   Pseudoephedrine-APAP-DM (TRIAMINIC PO) 10/22/2022 at Unknown time Self Yes Yes   Sig: Take by mouth   ULORIC 40 MG tablet 10/22/2022 at Unknown time Self Yes Yes   Zinc 100 MG TABS  Self Yes No   Sig: Take by mouth   cholecalciferol (VITAMIN D3) 1,000 units tablet 10/22/2022 at Unknown time Self Yes Yes   Sig: Take 1,000 Units by mouth daily   colchicine (COLCRYS) 0 6 mg tablet 10/22/2022 at Unknown time Self No Yes   Sig: Take 1 tablet (0 6 mg total) by mouth daily as needed for muscle/joint pain   diclofenac sodium (VOLTAREN) 50 mg EC tablet 10/22/2022 at Unknown time Self Yes Yes   Sig: Take 50 mg by mouth 2 (two) times a day   gabapentin (NEURONTIN) 300 mg capsule 10/22/2022 at Unknown time Self No Yes   Sig: Take 1 capsule (300 mg total) by mouth daily at bedtime   ibuprofen (MOTRIN) 600 mg tablet   No No   Sig: Take 1 tablet (600 mg total) by mouth every 6 (six) hours for 6 days   Patient not taking: Reported on 3/29/2021   indomethacin (INDOCIN) 50 mg capsule 10/22/2022 at Unknown time Self Yes Yes   Sig: take 1 capsule by oral route 3 times every day with food PRN   methocarbamol (ROBAXIN) 750 mg tablet   No No   Sig: Take 1 tablet (750 mg total) by mouth every 6 (six) hours for 5 days   Patient not taking: Reported on 3/29/2021      Facility-Administered Medications: None       Past Medical History:   Diagnosis Date   • Back injury    • Basal cell carcinoma    • Gout        Past Surgical History:   Procedure Laterality Date   • ROTATOR CUFF REPAIR Right     with bicep repair   • SKIN CANCER EXCISION      BCCA chest and back       Family History   Problem Relation Age of Onset   • Colon cancer Mother    • Lung cancer Father    • No Known Problems Sister    • Cancer Maternal Grandmother    • Cancer Maternal Grandfather    • Bone cancer Paternal Grandmother    • No Known Problems Paternal Grandfather    • Bone cancer Maternal Aunt    • Cancer Maternal Aunt    • No Known Problems Maternal Aunt    • No Known Problems Maternal Aunt    • Throat cancer Cousin      I have reviewed and agree with the history as documented      E-Cigarette/Vaping   • E-Cigarette Use Never User      E-Cigarette/Vaping Substances   • Nicotine No    • THC No    • CBD No    • Flavoring No    • Other No    • Unknown No      Social History     Tobacco Use   • Smoking status: Never Smoker   • Smokeless tobacco: Never Used   Vaping Use   • Vaping Use: Never used   Substance Use Topics   • Alcohol use: No     Comment: occasional    • Drug use: No       Review of Systems   Constitutional: Negative for chills, fatigue and fever  HENT: Negative for congestion and rhinorrhea  Eyes: Negative for visual disturbance  Respiratory: Negative for cough and shortness of breath  Cardiovascular: Negative for chest pain  Gastrointestinal: Negative for abdominal pain, diarrhea, nausea and vomiting  Endocrine: Negative for polyuria  Genitourinary: Negative for difficulty urinating, dysuria, flank pain and hematuria  Musculoskeletal: Positive for back pain  Negative for gait problem and joint swelling  Skin: Negative for rash  Neurological: Negative for weakness and numbness  Hematological: Does not bruise/bleed easily  Psychiatric/Behavioral: Negative for behavioral problems and confusion  All other systems reviewed and are negative  Physical Exam  Physical Exam  Vitals and nursing note reviewed  Constitutional:       General: He is not in acute distress  Appearance: Normal appearance  He is well-developed  He is obese  He is not ill-appearing, toxic-appearing or diaphoretic  HENT:      Head: Normocephalic and atraumatic  Nose: No congestion or rhinorrhea  Eyes:      General:         Right eye: No discharge  Left eye: No discharge  Cardiovascular:      Rate and Rhythm: Normal rate and regular rhythm  Pulmonary:      Effort: Pulmonary effort is normal  No respiratory distress  Breath sounds: Normal breath sounds  No wheezing or rales  Abdominal:      General: There is no distension  Palpations: Abdomen is soft  Tenderness: There is no abdominal tenderness   There is no right CVA tenderness, left CVA tenderness, guarding or rebound  Musculoskeletal:      Cervical back: Normal range of motion  No rigidity or bony tenderness  Thoracic back: No bony tenderness  Lumbar back: Tenderness present  No swelling, edema or spasms  Normal range of motion  Positive left straight leg raise test       Right lower leg: No edema  Left lower leg: No edema  Skin:     General: Skin is warm  Capillary Refill: Capillary refill takes less than 2 seconds  Neurological:      Mental Status: He is alert and oriented to person, place, and time  GCS: GCS eye subscore is 4  GCS verbal subscore is 5  GCS motor subscore is 6  Comments: 5/5 strength b/l UE/LE  Sensation grossly intact throughout  Psychiatric:         Mood and Affect: Mood normal          Behavior: Behavior normal          Vital Signs  ED Triage Vitals [10/23/22 0137]   Temperature Pulse Respirations Blood Pressure SpO2   98 2 °F (36 8 °C) 74 18 170/98 97 %      Temp Source Heart Rate Source Patient Position - Orthostatic VS BP Location FiO2 (%)   Tympanic Monitor Sitting Right arm --      Pain Score       7           Vitals:    10/23/22 0137   BP: 170/98   Pulse: 74   Patient Position - Orthostatic VS: Sitting         Visual Acuity      ED Medications  Medications   ketorolac (TORADOL) injection 15 mg (15 mg Intramuscular Given 10/23/22 0226)   acetaminophen (TYLENOL) tablet 975 mg (975 mg Oral Given 10/23/22 0225)   methocarbamol (ROBAXIN) tablet 1,000 mg (1,000 mg Oral Given 10/23/22 0225)       Diagnostic Studies  Results Reviewed     None                 No orders to display              Procedures  Procedures         ED Course                                             MDM  Number of Diagnoses or Management Options  Acute left-sided low back pain with left-sided sciatica  Diagnosis management comments:   58 y o  male presenting for low back pain  VSS, nontoxic appearing  Symptoms likely related to lumbar radiculopathy  No red flag symptoms      Will check PVR and treat symptomatically  Disposition: I have discussed with the patient our plan to discharge them from the ED and the patient is in agreement with this plan  Discharge Plan: Continue home analgesia regimen, Rx for robaxin  Comprehensive spine referral  RTED precautions emphasized  The patient was provided a written after visit summary with strict RTED precautions  Followup: I have discussed with the patient plan to follow up with their PCP and comprehensive spine  Contact information provided in AVS        Amount and/or Complexity of Data Reviewed  Tests in the radiology section of CPT®: reviewed  Review and summarize past medical records: yes    Patient Progress  Patient progress: stable      Disposition  Final diagnoses:   Acute left-sided low back pain with left-sided sciatica     Time reflects when diagnosis was documented in both MDM as applicable and the Disposition within this note     Time User Action Codes Description Comment    10/23/2022  2:52 AM Shiratigist Comer Rosalia [M54 [id number]] Acute left-sided low back pain with left-sided sciatica       ED Disposition     ED Disposition   Discharge    Condition   Stable    Date/Time   Sun Oct 23, 2022  2:51 AM    24 Raymonde Ramana Goode discharge to home/self care  Follow-up Information     Follow up With Specialties Details Why Contact Info Additional Information    Jaiden Roberson MD Family Medicine Schedule an appointment as soon as possible for a visit  To make appointment for reevaluation in 3-5 days  [address] [place] [id number]  [phone]       [facility] Physical Therapy Schedule an appointment as soon as possible for a visit  To establish care   [phone]          Discharge Medication List as of 10/23/2022  2:54 AM      CONTINUE these medications which have CHANGED    Details   methocarbamol (ROBAXIN) 500 mg tablet Take 1 tablet (500 mg total) by mouth daily at bedtime as needed for muscle spasms, Starting Sun 10/23/2022, Normal         CONTINUE these medications which have NOT CHANGED    Details   Ascorbic Acid (vitamin C) 1000 MG tablet Take 1,000 mg by mouth daily, Historical Med      cholecalciferol (VITAMIN D3) 1,000 units tablet Take 1,000 Units by mouth daily, Historical Med      colchicine (COLCRYS) 0 6 mg tablet Take 1 tablet (0 6 mg total) by mouth daily as needed for muscle/joint pain, Starting Mon 5/13/2019, No Print      diclofenac sodium (VOLTAREN) 50 mg EC tablet Take 50 mg by mouth 2 (two) times a day, Historical Med      gabapentin (NEURONTIN) 300 mg capsule Take 1 capsule (300 mg total) by mouth daily at bedtime, Starting Mon 5/13/2019, Print      indomethacin (INDOCIN) 50 mg capsule take 1 capsule by oral route 3 times every day with food PRN, Historical Med      Pseudoephedrine-APAP-DM (TRIAMINIC PO) Take by mouth, Historical Med      ULORIC 40 MG tablet Starting Sun 3/31/2019, Historical Med      ibuprofen (MOTRIN) 600 mg tablet Take 1 tablet (600 mg total) by mouth every 6 (six) hours for 6 days, Starting Mon 5/13/2019, Until Sun 5/19/2019, Normal      Zinc 100 MG TABS Take by mouth, Historical Med                 PDMP Review     None          ED Provider  Electronically Signed by           Ria Hameed DO  10/23/22 [id number]

## 2022-10-23 NOTE — DISCHARGE INSTRUCTIONS
You have been seen for low back pain  Please continue your home pain regimen  Return to the emergency department if you develop worsening pain, incontinence, weakness or any other symptoms of concern  Please follow up with your PCP and comprehensive spine by calling the number provided

## 2022-10-24 ENCOUNTER — HOSPITAL ENCOUNTER (OUTPATIENT)
Facility: HOSPITAL | Age: 62
Setting detail: OBSERVATION
Discharge: HOME | End: 2022-10-25
Attending: EMERGENCY MEDICINE | Admitting: INTERNAL MEDICINE
Payer: COMMERCIAL

## 2022-10-24 ENCOUNTER — APPOINTMENT (EMERGENCY)
Dept: CT IMAGING | Facility: HOSPITAL | Age: 62
End: 2022-10-24
Payer: COMMERCIAL

## 2022-10-24 ENCOUNTER — NURSE TRIAGE (OUTPATIENT)
Dept: PHYSICAL THERAPY | Facility: OTHER | Age: 62
End: 2022-10-24

## 2022-10-24 DIAGNOSIS — M54.30 SCIATICA: Primary | ICD-10-CM

## 2022-10-24 DIAGNOSIS — M51.37 DEGENERATIVE DISC DISEASE AT L5-S1 LEVEL: ICD-10-CM

## 2022-10-24 DIAGNOSIS — M54.16 LUMBAR BACK PAIN WITH RADICULOPATHY AFFECTING LEFT LOWER EXTREMITY: ICD-10-CM

## 2022-10-24 DIAGNOSIS — M54.42 ACUTE LEFT-SIDED LOW BACK PAIN WITH LEFT-SIDED SCIATICA: Primary | ICD-10-CM

## 2022-10-24 DIAGNOSIS — R26.2 AMBULATORY DYSFUNCTION: ICD-10-CM

## 2022-10-24 PROBLEM — R79.89 ELEVATED TSH: Status: ACTIVE | Noted: 2022-10-24

## 2022-10-24 PROBLEM — M10.9 GOUT: Status: ACTIVE | Noted: 2022-10-24

## 2022-10-24 LAB
ANION GAP SERPL CALCULATED.3IONS-SCNC: 5 MMOL/L (ref 4–13)
BASOPHILS # BLD AUTO: 0.05 THOUSANDS/ÂΜL (ref 0–0.1)
BASOPHILS NFR BLD AUTO: 1 % (ref 0–1)
BUN SERPL-MCNC: 18 MG/DL (ref 5–25)
CALCIUM SERPL-MCNC: 8.9 MG/DL (ref 8.3–10.1)
CHLORIDE SERPL-SCNC: 102 MMOL/L (ref 96–108)
CO2 SERPL-SCNC: 30 MMOL/L (ref 21–32)
CREAT SERPL-MCNC: 1 MG/DL (ref 0.6–1.3)
EOSINOPHIL # BLD AUTO: 0.16 THOUSAND/ÂΜL (ref 0–0.61)
EOSINOPHIL NFR BLD AUTO: 2 % (ref 0–6)
ERYTHROCYTE [DISTWIDTH] IN BLOOD BY AUTOMATED COUNT: 12.6 % (ref 11.6–15.1)
FLUAV RNA RESP QL NAA+PROBE: NEGATIVE
FLUBV RNA RESP QL NAA+PROBE: NEGATIVE
GFR SERPL CREATININE-BSD FRML MDRD: 80 ML/MIN/1.73SQ M
GLUCOSE SERPL-MCNC: 99 MG/DL (ref 65–140)
HCT VFR BLD AUTO: 44.3 % (ref 36.5–49.3)
HGB BLD-MCNC: 15.1 G/DL (ref 12–17)
IMM GRANULOCYTES # BLD AUTO: 0.02 THOUSAND/UL (ref 0–0.2)
IMM GRANULOCYTES NFR BLD AUTO: 0 % (ref 0–2)
LYMPHOCYTES # BLD AUTO: 2.04 THOUSANDS/ÂΜL (ref 0.6–4.47)
LYMPHOCYTES NFR BLD AUTO: 27 % (ref 14–44)
MCH RBC QN AUTO: 29.7 PG (ref 26.8–34.3)
MCHC RBC AUTO-ENTMCNC: 34.1 G/DL (ref 31.4–37.4)
MCV RBC AUTO: 87 FL (ref 82–98)
MONOCYTES # BLD AUTO: 0.81 THOUSAND/ÂΜL (ref 0.17–1.22)
MONOCYTES NFR BLD AUTO: 11 % (ref 4–12)
NEUTROPHILS # BLD AUTO: 4.36 THOUSANDS/ÂΜL (ref 1.85–7.62)
NEUTS SEG NFR BLD AUTO: 59 % (ref 43–75)
NRBC BLD AUTO-RTO: 0 /100 WBCS
PLATELET # BLD AUTO: 207 THOUSANDS/UL (ref 149–390)
PMV BLD AUTO: 9.7 FL (ref 8.9–12.7)
POTASSIUM SERPL-SCNC: 4.4 MMOL/L (ref 3.5–5.3)
RBC # BLD AUTO: 5.08 MILLION/UL (ref 3.88–5.62)
RSV RNA RESP QL NAA+PROBE: NEGATIVE
SARS-COV-2 RNA RESP QL NAA+PROBE: NEGATIVE
SODIUM SERPL-SCNC: 137 MMOL/L (ref 135–147)
TSH SERPL DL<=0.05 MIU/L-ACNC: 4.9 UIU/ML (ref 0.45–4.5)
WBC # BLD AUTO: 7.44 THOUSAND/UL (ref 4.31–10.16)

## 2022-10-24 PROCEDURE — 0241U HB NFCT DS VIR RESP RNA 4 TRGT: CPT | Performed by: INTERNAL MEDICINE

## 2022-10-24 PROCEDURE — 74176 CT ABD & PELVIS W/O CONTRAST: CPT

## 2022-10-24 PROCEDURE — 85025 COMPLETE CBC W/AUTO DIFF WBC: CPT | Performed by: EMERGENCY MEDICINE

## 2022-10-24 PROCEDURE — 80048 BASIC METABOLIC PNL TOTAL CA: CPT | Performed by: EMERGENCY MEDICINE

## 2022-10-24 PROCEDURE — 99220 PR INITIAL OBSERVATION CARE/DAY 70 MINUTES: CPT | Performed by: INTERNAL MEDICINE

## 2022-10-24 PROCEDURE — 99285 EMERGENCY DEPT VISIT HI MDM: CPT | Performed by: EMERGENCY MEDICINE

## 2022-10-24 PROCEDURE — 84443 ASSAY THYROID STIM HORMONE: CPT

## 2022-10-24 PROCEDURE — G1004 CDSM NDSC: HCPCS

## 2022-10-24 PROCEDURE — 36415 COLL VENOUS BLD VENIPUNCTURE: CPT | Performed by: EMERGENCY MEDICINE

## 2022-10-24 RX ORDER — TRAMADOL HYDROCHLORIDE 50 MG/1
50 TABLET ORAL EVERY 6 HOURS PRN
Status: DISCONTINUED | OUTPATIENT
Start: 2022-10-24 | End: 2022-10-24

## 2022-10-24 RX ORDER — MAGNESIUM HYDROXIDE/ALUMINUM HYDROXICE/SIMETHICONE 120; 1200; 1200 MG/30ML; MG/30ML; MG/30ML
30 SUSPENSION ORAL EVERY 6 HOURS PRN
Status: DISCONTINUED | OUTPATIENT
Start: 2022-10-24 | End: 2022-10-25 | Stop reason: HOSPADM

## 2022-10-24 RX ORDER — GABAPENTIN 300 MG/1
300 CAPSULE ORAL
Status: DISCONTINUED | OUTPATIENT
Start: 2022-10-24 | End: 2022-10-25 | Stop reason: HOSPADM

## 2022-10-24 RX ORDER — ENOXAPARIN SODIUM 100 MG/ML
40 INJECTION SUBCUTANEOUS EVERY 12 HOURS SCHEDULED
Status: DISCONTINUED | OUTPATIENT
Start: 2022-10-24 | End: 2022-10-25 | Stop reason: HOSPADM

## 2022-10-24 RX ORDER — METHYLPREDNISOLONE SODIUM SUCCINATE 40 MG/ML
40 INJECTION, POWDER, LYOPHILIZED, FOR SOLUTION INTRAMUSCULAR; INTRAVENOUS DAILY
Status: DISCONTINUED | OUTPATIENT
Start: 2022-10-25 | End: 2022-10-24

## 2022-10-24 RX ORDER — FENTANYL CITRATE 50 UG/ML
50 INJECTION, SOLUTION INTRAMUSCULAR; INTRAVENOUS ONCE
Status: COMPLETED | OUTPATIENT
Start: 2022-10-24 | End: 2022-10-24

## 2022-10-24 RX ORDER — METHYLPREDNISOLONE SODIUM SUCCINATE 40 MG/ML
40 INJECTION, POWDER, LYOPHILIZED, FOR SOLUTION INTRAMUSCULAR; INTRAVENOUS EVERY 8 HOURS SCHEDULED
Status: DISCONTINUED | OUTPATIENT
Start: 2022-10-24 | End: 2022-10-25 | Stop reason: HOSPADM

## 2022-10-24 RX ORDER — ALLOPURINOL 100 MG/1
200 TABLET ORAL DAILY
Refills: 0 | Status: DISCONTINUED | OUTPATIENT
Start: 2022-10-25 | End: 2022-10-25 | Stop reason: HOSPADM

## 2022-10-24 RX ORDER — METHYLPREDNISOLONE SODIUM SUCCINATE 40 MG/ML
40 INJECTION, POWDER, LYOPHILIZED, FOR SOLUTION INTRAMUSCULAR; INTRAVENOUS DAILY
Status: DISCONTINUED | OUTPATIENT
Start: 2022-10-24 | End: 2022-10-24

## 2022-10-24 RX ORDER — TRAMADOL HYDROCHLORIDE 50 MG/1
50 TABLET ORAL EVERY 4 HOURS PRN
Status: DISCONTINUED | OUTPATIENT
Start: 2022-10-24 | End: 2022-10-25 | Stop reason: HOSPADM

## 2022-10-24 RX ORDER — DOCUSATE SODIUM 100 MG/1
100 CAPSULE, LIQUID FILLED ORAL 2 TIMES DAILY
Status: DISCONTINUED | OUTPATIENT
Start: 2022-10-24 | End: 2022-10-25 | Stop reason: HOSPADM

## 2022-10-24 RX ORDER — ACETAMINOPHEN 325 MG/1
650 TABLET ORAL EVERY 6 HOURS PRN
Status: DISCONTINUED | OUTPATIENT
Start: 2022-10-24 | End: 2022-10-25 | Stop reason: HOSPADM

## 2022-10-24 RX ORDER — HYDROMORPHONE HCL/PF 1 MG/ML
0.5 SYRINGE (ML) INJECTION EVERY 4 HOURS PRN
Status: DISCONTINUED | OUTPATIENT
Start: 2022-10-24 | End: 2022-10-25

## 2022-10-24 RX ORDER — ONDANSETRON 2 MG/ML
4 INJECTION INTRAMUSCULAR; INTRAVENOUS EVERY 6 HOURS PRN
Status: DISCONTINUED | OUTPATIENT
Start: 2022-10-24 | End: 2022-10-25 | Stop reason: HOSPADM

## 2022-10-24 RX ADMIN — SODIUM CHLORIDE 1000 ML: 0.9 INJECTION, SOLUTION INTRAVENOUS at 16:26

## 2022-10-24 RX ADMIN — GABAPENTIN 300 MG: 300 CAPSULE ORAL at 22:42

## 2022-10-24 RX ADMIN — TRAMADOL HYDROCHLORIDE 50 MG: 50 TABLET, COATED ORAL at 23:58

## 2022-10-24 RX ADMIN — ENOXAPARIN SODIUM 40 MG: 40 INJECTION SUBCUTANEOUS at 20:14

## 2022-10-24 RX ADMIN — HYDROMORPHONE HYDROCHLORIDE 0.5 MG: 1 INJECTION, SOLUTION INTRAMUSCULAR; INTRAVENOUS; SUBCUTANEOUS at 22:45

## 2022-10-24 RX ADMIN — METHYLPREDNISOLONE SODIUM SUCCINATE 40 MG: 40 INJECTION, POWDER, FOR SOLUTION INTRAMUSCULAR; INTRAVENOUS at 20:14

## 2022-10-24 RX ADMIN — HYDROMORPHONE HYDROCHLORIDE 0.5 MG: 1 INJECTION, SOLUTION INTRAMUSCULAR; INTRAVENOUS; SUBCUTANEOUS at 18:32

## 2022-10-24 RX ADMIN — FENTANYL CITRATE 50 MCG: 50 INJECTION, SOLUTION INTRAMUSCULAR; INTRAVENOUS at 16:27

## 2022-10-24 RX ADMIN — DOCUSATE SODIUM 100 MG: 100 CAPSULE, LIQUID FILLED ORAL at 18:33

## 2022-10-24 NOTE — PROGRESS NOTES
[address]  Progress Note - [place] Castellanos 1960, 58 y o  male MRN: [id number]  Unit/Bed#: 906-65 Encounter: [id number]  Primary Care Provider: Tejas Maier MD   Date and time admitted to hospital: 10/24/2022  2:09 PM    Gout  Assessment & Plan  Hx of gout  Last gout attack >10 years ago  Plan:    Allopurinol 200mg for maintenance  Lumbar back pain with radiculopathy affecting left lower extremity  Assessment & Plan  2-3 weeks of worsening severe acute lower back pain with radiating pain down leg when patient walks  Patient unable to ambulate  Imaging demonstrates L4-L5 moderate-severe foraminal narrowing  No saddle anethesia, weakness, bowel or bladder incontinence  Plan  -IV methylprednisolone 40mg  -Dilaudid 0 5 mg Q4 PRN  -PT/OT evaluation  -MRI of L spine and hip        VTE Prophylaxis: None  / sequential compression device   Code Status: Full  POLST: There is no POLST form on file for this patient (pre-hospital)  Discussion with family: Yes, spouse present on admission evaluation  Anticipated Length of Stay:  Patient will be admitted on an Observation basis with an anticipated length of stay of less than 2 midnights  Justification for Hospital Stay: Severe pain    Total Time for Visit, including Counseling / Coordination of Care: 30 minutes  Greater than 50% of this total time spent on direct patient counseling and coordination of care  Chief Complaint:   Acute lower back pain with compromised ability to ambulate  History of Present Illness:    [place] Castellanos is a 58 y o  male with PMHx of gout who presents with 2-3 weeks of worsening acute lower back pain that radiates to his legs while walking, now unable to ambulate  Patient denies inciting event  Patient denies numbness in his groin, difficulty controlling his ability to urinate or pass a bowel movement, weakness, F/C, N/V, recent illness  Patient is a   Reports good health otherwise  Patient was seen in ED on 10/23 for admitting complaint and given script for tramadol and robaxin without relief  Review of Systems:    Review of Systems   Constitutional: Positive for activity change  HENT: Negative  Eyes: Negative  Respiratory: Negative  Cardiovascular: Negative  Gastrointestinal: Negative  Endocrine: Negative  Genitourinary: Negative  Musculoskeletal: Positive for back pain and gait problem  Skin: Negative  Allergic/Immunologic: Negative  Neurological: Negative for weakness  Hematological: Negative  Psychiatric/Behavioral: Negative  Past Medical and Surgical History:     Past Medical History:   Diagnosis Date   • Back injury    • Basal cell carcinoma    • Gout        Past Surgical History:   Procedure Laterality Date   • ROTATOR CUFF REPAIR Right     with bicep repair   • SKIN CANCER EXCISION      BCCA chest and back       Meds/Allergies:    Prior to Admission medications    Medication Sig Start Date End Date Taking?  Authorizing Provider   gabapentin (NEURONTIN) 300 mg capsule Take 1 capsule (300 mg total) by mouth daily at bedtime 5/13/19  Yes ZBIGNIEW Duron Ra   ULORIC 40 MG tablet  3/31/19  Yes Historical Provider, MD   Ascorbic Acid (vitamin C) 1000 MG tablet Take 1,000 mg by mouth daily  10/24/22  Historical Provider, MD   cholecalciferol (VITAMIN D3) 1,000 units tablet Take 1,000 Units by mouth daily  10/24/22  Historical Provider, MD   colchicine (COLCRYS) 0 6 mg tablet Take 1 tablet (0 6 mg total) by mouth daily as needed for muscle/joint pain 5/13/19 10/24/22  ZBIGNIEW Duron Ra   diclofenac sodium (VOLTAREN) 50 mg EC tablet Take 50 mg by mouth 2 (two) times a day  10/24/22  Historical Provider, MD   ibuprofen (MOTRIN) 600 mg tablet Take 1 tablet (600 mg total) by mouth every 6 (six) hours for 6 days  Patient not taking: Reported on 3/29/2021 5/13/19 10/24/22  ZBIGNIEW Duron Ra   indomethacin (INDOCIN) 50 mg capsule take 1 capsule by oral route 3 times every day with food PRN 2/15/21 10/24/22  Historical Provider, MD   methocarbamol (ROBAXIN) 500 mg tablet Take 1 tablet (500 mg total) by mouth daily at bedtime as needed for muscle spasms 10/23/22 10/24/22  Viviane Chaudhry DO   Pseudoephedrine-APAP-DM (TRIAMINIC PO) Take by mouth  10/24/22  Historical Provider, MD   Zinc 100 MG TABS Take by mouth  10/24/22  Historical Provider, MD     I have reviewed home medications with patient personally  Allergies: Allergies   Allergen Reactions   • Morphine Shortness Of Breath   • Oxycodone Shortness Of Breath     Pt tolerated this in the past without SOB   • Cortisone Diarrhea, Abdominal Pain and Fever       Social History:     Marital Status: /Civil Union   Occupation:   Patient Pre-hospital Living Situation: Lives with wife  Patient Pre-hospital Level of Mobility: Unable to ambulate  Patient Pre-hospital Diet Restrictions: None  Substance Use History:   Social History     Substance and Sexual Activity   Alcohol Use Never     Social History     Tobacco Use   Smoking Status Never Smoker   Smokeless Tobacco Never Used     Social History     Substance and Sexual Activity   Drug Use No       Family History:    non-contributory    Physical Exam:     Vitals:   Blood Pressure: (!) 144/108 (10/24/22 1828)  Pulse: 66 (10/24/22 1828)  Temperature: 97 8 °F (36 6 °C) (10/24/22 1828)  Temp Source: Temporal (10/24/22 1417)  Respirations: 15 (10/24/22 1828)  Height: 6' 1" (185 4 cm) (10/24/22 1824)  Weight - Scale: (!) 142 kg (313 lb 4 4 oz) (10/24/22 1824)  SpO2: 97 % (10/24/22 1828)    Physical Exam  Constitutional:       Appearance: He is obese  HENT:      Head: Normocephalic  Right Ear: External ear normal       Left Ear: External ear normal       Nose: Nose normal       Mouth/Throat:      Pharynx: Oropharynx is clear  Eyes:      Extraocular Movements: Extraocular movements intact        Conjunctiva/sclera: Conjunctivae normal  Cardiovascular:      Rate and Rhythm: Normal rate and regular rhythm  Pulses: Normal pulses  Heart sounds: Normal heart sounds  Pulmonary:      Effort: Pulmonary effort is normal       Breath sounds: Normal breath sounds  Abdominal:      General: Bowel sounds are normal       Palpations: Abdomen is soft  Musculoskeletal:         General: Normal range of motion  Comments: Straight leg test limited by pain  Pain in hip and does not radiate down leg  Patient reports radiating pain when ambulating  Skin:     General: Skin is warm  Capillary Refill: Capillary refill takes less than 2 seconds  Neurological:      General: No focal deficit present  Mental Status: Mental status is at baseline  Psychiatric:         Mood and Affect: Mood normal          Behavior: Behavior normal          Additional Data:     Lab Results: I have personally reviewed pertinent reports  Results from last 7 days   Lab Units 10/24/22  1626   WBC Thousand/uL 7 44   HEMOGLOBIN g/dL 15 1   HEMATOCRIT % 44 3   PLATELETS Thousands/uL 207   NEUTROS PCT % 59   LYMPHS PCT % 27   MONOS PCT % 11   EOS PCT % 2     Results from last 7 days   Lab Units 10/24/22  1626   SODIUM mmol/L 137   POTASSIUM mmol/L 4 4   CHLORIDE mmol/L 102   CO2 mmol/L 30   BUN mg/dL 18   CREATININE mg/dL 1 00   ANION GAP mmol/L 5   CALCIUM mg/dL 8 9   GLUCOSE RANDOM mg/dL 99                       Imaging: I have personally reviewed pertinent reports  CT abdomen pelvis wo contrast   Final Result by Rah Nagy MD (10/24 1627)      No acute findings in the abdomen or the pelvis  Workstation performed: [id number]         CT recon only lumbar spine (no charge)   Final Result by Tere Marques MD (10/24 1618)      No acute osseous abnormality of lumbar spine  Degenerative changes, as detailed above  Please see same-day CT abdomen pelvis without contrast for further evaluation  Workstation performed: [id number]         MRI inpatient order    (Results Pending)       EKG, Pathology, and Other Studies Reviewed on Admission:   · EKG: NSR    Allscripts / Epic Records Reviewed: Nina Mejia MD
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
## 2022-10-24 NOTE — ASSESSMENT & PLAN NOTE
2-3 weeks of worsening severe acute lower back pain with radiating pain down leg when patient walks  Patient unable to ambulate  Imaging demonstrates L4-L5 moderate-severe foraminal narrowing  No saddle anethesia, weakness, bowel or bladder incontinence       Plan  -IV methylprednisolone 40mg  -Dilaudid 0 5 mg Q4 PRN  -PT/OT evaluation  -MRI of L spine and hip

## 2022-10-24 NOTE — QUICK NOTE
Seen and examined the patient at bedside, not in distress  Discussed the managemnt plan with family  Answered all questions

## 2022-10-24 NOTE — TELEPHONE ENCOUNTER
Additional Information  • Negative: Has the patient had unexplained weight loss? • Negative: Does the patient have a fever? • Negative: Is the patient experiencing urine retention? • Negative: Is the patient experiencing acute drop foot or paralysis? • Negative: Has the patient experienced major trauma? (fall from height, high speed collision, direct blow to spine) and is also experiencing nausea, light-headedness, or loss of consciousness? • Negative: Is the patient experiencing blood in sputum? • Negative: Is this a chronic condition? Protocols used: SL AMB COMPREHENSIVE SPINE PROGRAM PROTOCOL    This RN did review in detail the Comprehensive Spine Program and what we can provide for their back pain  Patient is agreeable to being triaged by this RN and would like to proceed with Physical Therapy  Referral was placed for Physical Therapy at the [facility] site  Patients information was sent to the  to make evaluation appointment  Patient made aware that the PT office  will be calling to schedule the appointment  Patient was provided with the phone number to the PT office  No further questions and/or concerns were voiced by the patient at this time  Patient states understanding of the referral that was placed  Referral Closed

## 2022-10-24 NOTE — ED PROVIDER NOTES
Final Diagnosis:  1  Sciatica    2  Ambulatory dysfunction        Chief Complaint   Patient presents with   • Back Pain     Patient presents to the ED for back pain, left sided  Patient states was seen here the other day for the same thing  Patient attempting to follow up with PCP for MRI and outpatient therapy but is unsuccessful  Patient states pain is worsening  Denies any loss of bladder or bowel  HPI  Patient presents for evaluation of left-sided hip pain radiating down his left leg  Patient believes that this is worsening of sciatica  Patient was seen here last night for a similar complaint  Provided with Robaxin and anti-inflammatories  He states that this helped him briefly and then his pain returned acutely  Patient states that he had difficulty walking currently secondary to the pain  He did reach out to the [facility] that she tried set him up with physical therapy  He also could reach out to his primary care office attempt to arrange MRI  The sedating you to have other imaging done prior to an MRI  Presenting now for further evaluation  Patient states that he is not having any abdominal pain  No nausea or vomiting  No constipation or diarrhea  Normal bowel movements  No chest pain  Unless otherwise specified:  - No language barrier    - History obtained from patient  - There are no limitations to the history obtained  - Previous charting was reviewed    PMH:   has a past medical history of Back injury, Basal cell carcinoma, and Gout  PSH:   has a past surgical history that includes Rotator cuff repair (Right) and Skin cancer excision  ROS:  Review of Systems   -   - 13 point ROS was performed and all are normal unless stated in the history above  - Nursing note reviewed  Vitals reviewed  - Orders placed by myself and/or advanced practitioner / resident          PE:   Vitals:    10/24/22 1451 10/24/22 1506 10/24/22 1521 10/24/22 1536   BP: 164/79 161/76 157/78 165/89   BP Location:       Pulse: 71 69 68 66   Resp:       Temp:       TempSrc:       SpO2: 96% 94% 95% 96%   Weight:       Height:         Vitals reviewed by me  Patient appears uncomfortable lying flat in bed  Abdomen is soft and nontender  No tenderness with palpation around the hip  Unless otherwise specified above:    General: VS reviewed  Appears in NAD    Head: Normocephalic, atraumatic  Eyes: EOM-I  No exudate  ENT: Atraumatic external nose and ears  No malocclusion  No stridor  No drooling  Neck: No JVD  CV: No pallor noted  Lungs:   No tachypnea  No respiratory distress    Abdomen:  Soft, non-tender, non-distended    MSK:   No obvious deformity    Skin: Dry, intact  No obvious rash  Neuro: Awake, alert, GCS15, CN II-XII grossly intact  Speaking in full sentences  Motor grossly intact  Psychiatric/Behavioral: Appropriate mood and affect   Exam: deferred    Physical Exam     Procedures   A:  - Nursing note reviewed  ED Course as of 10/24/22 1714   Mon Oct 24, 2022   1658 No acute finding on CT imaging  Will have patient ambulate to determine disposition  CT abdomen pelvis wo contrast   Final Result      No acute findings in the abdomen or the pelvis  Workstation performed: [id number]         CT recon only lumbar spine (no charge)   Final Result      No acute osseous abnormality of lumbar spine  Degenerative changes, as detailed above  Please see same-day CT abdomen pelvis without contrast for further evaluation           Workstation performed: [id number]           Orders Placed This Encounter   Procedures   • COVID19, Influenza A/B, RSV PCR, [facility]   • CT abdomen pelvis wo contrast   • CT recon only lumbar spine (no charge)   • CBC and differential   • Basic metabolic panel   • Diet Regular; Regular House   • Insert peripheral IV   • Place in Observation     Labs Reviewed   COVID19, INFLUENZA A/B, RSV PCR, [facility]   CBC AND DIFFERENTIAL       Result Value Ref Range Status    WBC 7 44  4 31 - 10 16 Thousand/uL Final    RBC 5 08  3 88 - 5 62 Million/uL Final    Hemoglobin 15 1  12 0 - 17 0 g/dL Final    Hematocrit 44 3  36 5 - 49 3 % Final    MCV 87  82 - 98 fL Final    MCH 29 7  26 8 - 34 3 pg Final    MCHC 34 1  31 4 - 37 4 g/dL Final    RDW 12 6  11 6 - 15 1 % Final    MPV 9 7  8 9 - 12 7 fL Final    Platelets 592  344 - 390 Thousands/uL Final    nRBC 0  /100 WBCs Final    Neutrophils Relative 59  43 - 75 % Final    Immat GRANS % 0  0 - 2 % Final    Lymphocytes Relative 27  14 - 44 % Final    Monocytes Relative 11  4 - 12 % Final    Eosinophils Relative 2  0 - 6 % Final    Basophils Relative 1  0 - 1 % Final    Neutrophils Absolute 4 36  1 85 - 7 62 Thousands/µL Final    Immature Grans Absolute 0 02  0 00 - 0 20 Thousand/uL Final    Lymphocytes Absolute 2 04  0 60 - 4 47 Thousands/µL Final    Monocytes Absolute 0 81  0 17 - 1 22 Thousand/µL Final    Eosinophils Absolute 0 16  0 00 - 0 61 Thousand/µL Final    Basophils Absolute 0 05  0 00 - 0 10 Thousands/µL Final   BASIC METABOLIC PANEL    Sodium 815  135 - 147 mmol/L Final    Potassium 4 4  3 5 - 5 3 mmol/L Final    Chloride 102  96 - 108 mmol/L Final    CO2 30  21 - 32 mmol/L Final    ANION GAP 5  4 - 13 mmol/L Final    BUN 18  5 - 25 mg/dL Final    Creatinine 1 00  0 60 - 1 30 mg/dL Final    Comment: Standardized to IDMS reference method    Glucose 99  65 - 140 mg/dL Final    Comment: If the patient is fasting, the ADA then defines impaired fasting glucose as > 100 mg/dL and diabetes as > or equal to 123 mg/dL  Specimen collection should occur prior to Sulfasalazine administration due to the potential for falsely depressed results  Specimen collection should occur prior to Sulfapyridine administration due to the potential for falsely elevated results      Calcium 8 9  8 3 - 10 1 mg/dL Final    eGFR 80  ml/min/1 73sq m Final    Narrative:     Linda guidelines for Chronic Kidney Disease (CKD):   •  Stage 1 with normal or high GFR (GFR > 90 mL/min/1 73 square meters)  •  Stage 2 Mild CKD (GFR = 60-89 mL/min/1 73 square meters)  •  Stage 3A Moderate CKD (GFR = 45-59 mL/min/1 73 square meters)  •  Stage 3B Moderate CKD (GFR = 30-44 mL/min/1 73 square meters)  •  Stage 4 Severe CKD (GFR = 15-29 mL/min/1 73 square meters)  •  Stage 5 End Stage CKD (GFR <15 mL/min/1 73 square meters)  Note: GFR calculation is accurate only with a steady state creatinine         Final Diagnosis:  1  Sciatica    2  Ambulatory dysfunction        P:  - patient presents for evaluation of worsening low back pain  Given his history it is likely related to his ongoing sciatica  As this is worsened will repeat get repeat imaging, analgesia, labs  We then discussed need for admission for pain control and further evaluation versus discharge home  -patient attempted to ambulate with me bedside  He was able to take 1 step in and complained of severe pain causing and sit back down on the stretcher  At this time does not believe that he can go home secondary to his ambulatory dysfunction  Reviewed with medical team   Will admit for further treatment as well as PT and OT as needed      Medications   sodium chloride 0 9 % bolus 1,000 mL (1,000 mL Intravenous New Bag 10/24/22 1626)   fentanyl citrate (PF) 100 MCG/2ML 50 mcg (50 mcg Intravenous Given 10/24/22 1627)     Time reflects when diagnosis was documented in both MDM as applicable and the Disposition within this note     Time User Action Codes Description Comment    10/24/2022  5:13 PM Negin Long Add [M54 30] Sciatica     10/24/2022  5:13 PM Luis Miguel Mix Add [R26 2] Ambulatory dysfunction       ED Disposition     ED Disposition   Admit    Condition   Stable    Date/Time   Mon Oct 24, 2022  5:13 PM    Comment   Case was discussed with KONG and the patient's admission status was agreed to be Admission Status: observation status to the service of Dr Soila Lawson   Follow-up Information    None       Patient's Medications   Discharge Prescriptions    No medications on file     No discharge procedures on file  Prior to Admission Medications   Prescriptions Last Dose Informant Patient Reported? Taking? Ascorbic Acid (vitamin C) 1000 MG tablet  Self Yes No   Sig: Take 1,000 mg by mouth daily   Pseudoephedrine-APAP-DM (TRIAMINIC PO)  Self Yes No   Sig: Take by mouth   ULORIC 40 MG tablet  Self Yes No   Zinc 100 MG TABS  Self Yes No   Sig: Take by mouth   cholecalciferol (VITAMIN D3) 1,000 units tablet  Self Yes No   Sig: Take 1,000 Units by mouth daily   colchicine (COLCRYS) 0 6 mg tablet  Self No No   Sig: Take 1 tablet (0 6 mg total) by mouth daily as needed for muscle/joint pain   diclofenac sodium (VOLTAREN) 50 mg EC tablet  Self Yes No   Sig: Take 50 mg by mouth 2 (two) times a day   gabapentin (NEURONTIN) 300 mg capsule  Self No No   Sig: Take 1 capsule (300 mg total) by mouth daily at bedtime   ibuprofen (MOTRIN) 600 mg tablet   No No   Sig: Take 1 tablet (600 mg total) by mouth every 6 (six) hours for 6 days   Patient not taking: Reported on 3/29/2021   indomethacin (INDOCIN) 50 mg capsule  Self Yes No   Sig: take 1 capsule by oral route 3 times every day with food PRN   methocarbamol (ROBAXIN) 500 mg tablet   No No   Sig: Take 1 tablet (500 mg total) by mouth daily at bedtime as needed for muscle spasms      Facility-Administered Medications: None       Portions of the record may have been created with voice recognition software  Occasional wrong word or "sound a like" substitutions may have occurred due to the inherent limitations of voice recognition software  Read the chart carefully and recognize, using context, where substitutions have occurred      Electronically signed by:  MD Kami Santos MD  10/24/22 2205

## 2022-10-24 NOTE — PLAN OF CARE
Problem: PAIN - ADULT  Goal: Verbalizes/displays adequate comfort level or baseline comfort level  Description: Interventions:  - Encourage patient to monitor pain and request assistance  - Assess pain using appropriate pain scale  - Administer analgesics based on type and severity of pain and evaluate response  - Implement non-pharmacological measures as appropriate and evaluate response  - Consider cultural and social influences on pain and pain management  - Notify physician/advanced practitioner if interventions unsuccessful or patient reports new pain  Outcome: Progressing     Problem: INFECTION - ADULT  Goal: Absence or prevention of progression during hospitalization  Description: INTERVENTIONS:  - Assess and monitor for signs and symptoms of infection  - Monitor lab/diagnostic results  - Monitor all insertion sites, i e  indwelling lines, tubes, and drains  - Monitor endotracheal if appropriate and nasal secretions for changes in amount and color  - Marina appropriate cooling/warming therapies per order  - Administer medications as ordered  - Instruct and encourage patient and family to use good hand hygiene technique  - Identify and instruct in appropriate isolation precautions for identified infection/condition  Outcome: Progressing     Problem: SAFETY ADULT  Goal: Patient will remain free of falls  Description: INTERVENTIONS:  - Educate patient/family on patient safety including physical limitations  - Instruct patient to call for assistance with activity   - Consult OT/PT to assist with strengthening/mobility   - Keep Call bell within reach  - Keep bed low and locked with side rails adjusted as appropriate  - Keep care items and personal belongings within reach  - Initiate and maintain comfort rounds  - Make Fall Risk Sign visible to staff  Outcome: Progressing  Goal: Maintain or return to baseline ADL function  Description: INTERVENTIONS:  -  Assess patient's ability to carry out ADLs; assess patient's baseline for ADL function and identify physical deficits which impact ability to perform ADLs (bathing, care of mouth/teeth, toileting, grooming, dressing, etc )  - Assess/evaluate cause of self-care deficits   - Assess range of motion  - Assess patient's mobility; develop plan if impaired  - Assess patient's need for assistive devices and provide as appropriate  - Encourage maximum independence but intervene and supervise when necessary  - Involve family in performance of ADLs  - Assess for home care needs following discharge   - Consider OT consult to assist with ADL evaluation and planning for discharge  - Provide patient education as appropriate  Outcome: Progressing  Goal: Maintains/Returns to pre admission functional level  Description: INTERVENTIONS:  - Perform BMAT or MOVE assessment daily    - Set and communicate daily mobility goal to care team and patient/family/caregiver     - Collaborate with rehabilitation services on mobility goals if consulted  - Ambulate patient 3 times a day  - Out of bed to chair 3 times a day   - Out of bed for meals 3 times a day  - Out of bed for toileting  - Record patient progress and toleration of activity level   Outcome: Progressing     Problem: DISCHARGE PLANNING  Goal: Discharge to home or other facility with appropriate resources  Description: INTERVENTIONS:  - Identify barriers to discharge w/patient and caregiver  - Arrange for needed discharge resources and transportation as appropriate  - Identify discharge learning needs (meds, wound care, etc )  - Arrange for interpretive services to assist at discharge as needed  - Refer to Case Management Department for coordinating discharge planning if the patient needs post-hospital services based on physician/advanced practitioner order or complex needs related to functional status, cognitive ability, or social support system  Outcome: Progressing     Problem: Knowledge Deficit  Goal: Patient/family/caregiver demonstrates understanding of disease process, treatment plan, medications, and discharge instructions  Description: Complete learning assessment and assess knowledge base    Interventions:  - Provide teaching at level of understanding  - Provide teaching via preferred learning methods  Outcome: Progressing     Problem: MUSCULOSKELETAL - ADULT  Goal: Maintain or return mobility to safest level of function  Description: INTERVENTIONS:  - Assess patient's ability to carry out ADLs; assess patient's baseline for ADL function and identify physical deficits which impact ability to perform ADLs (bathing, care of mouth/teeth, toileting, grooming, dressing, etc )  - Assess/evaluate cause of self-care deficits   - Assess range of motion  - Assess patient's mobility  - Assess patient's need for assistive devices and provide as appropriate  - Encourage maximum independence but intervene and supervise when necessary  - Involve family in performance of ADLs  - Assess for home care needs following discharge   - Consider OT consult to assist with ADL evaluation and planning for discharge  - Provide patient education as appropriate  Outcome: Progressing

## 2022-10-24 NOTE — H&P
[address]  History and Physical - Jonathan Martinez 1960, 58 y o  male MRN: [id number]  Unit/Bed#: 408-01 Encounter: [id number]  Primary Care Provider: aCt Lemos MD   Date and time admitted to hospital: 10/24/2022  2:09 PM     Gout  Assessment & Plan  Hx of gout  Last gout attack >10 years ago       Plan:    Allopurinol 200mg for maintenance      Lumbar back pain with radiculopathy affecting left lower extremity  Assessment & Plan  2-3 weeks of worsening severe acute lower back pain with radiating pain down leg when patient walks  Patient unable to ambulate  Imaging demonstrates L4-L5 moderate-severe foraminal narrowing  No saddle anethesia, weakness, bowel or bladder incontinence       Plan  -IV methylprednisolone 40mg  -Dilaudid 0 5 mg Q4 PRN  -PT/OT evaluation  -MRI of L spine and hip           VTE Prophylaxis: None  / sequential compression device   Code Status: Full  POLST: There is no POLST form on file for this patient (pre-hospital)  Discussion with family: Yes, spouse present on admission evaluation      Anticipated Length of Stay:  Patient will be admitted on an Observation basis with an anticipated length of stay of less than 2 midnights  Justification for Hospital Stay: Severe pain     Total Time for Visit, including Counseling / Coordination of Care: 30 minutes  Greater than 50% of this total time spent on direct patient counseling and coordination of care      Chief Complaint:   Acute lower back pain with compromised ability to ambulate      History of Present Illness:     Jonathan Martinez is a 58 y o  male with PMHx of gout who presents with 2-3 weeks of worsening acute lower back pain that radiates to his legs while walking, now unable to ambulate  Patient denies inciting event  Patient denies numbness in his groin, difficulty controlling his ability to urinate or pass a bowel movement, weakness, F/C, N/V, recent illness  Patient is a    Reports good health otherwise  Patient was seen in ED on 10/23 for admitting complaint and given script for tramadol and robaxin without relief       Review of Systems:     Review of Systems   Constitutional: Positive for activity change  HENT: Negative  Eyes: Negative  Respiratory: Negative  Cardiovascular: Negative  Gastrointestinal: Negative  Endocrine: Negative  Genitourinary: Negative  Musculoskeletal: Positive for back pain and gait problem  Skin: Negative  Allergic/Immunologic: Negative  Neurological: Negative for weakness  Hematological: Negative  Psychiatric/Behavioral: Negative           Past Medical and Surgical History:      Medical History        Past Medical History:   Diagnosis Date   • Back injury     • Basal cell carcinoma     • Gout              Surgical History         Past Surgical History:   Procedure Laterality Date   • ROTATOR CUFF REPAIR Right       with bicep repair   • SKIN CANCER EXCISION         BCCA chest and back            Meds/Allergies:             Prior to Admission medications    Medication Sig Start Date End Date Taking?  Authorizing Provider   gabapentin (NEURONTIN) 300 mg capsule Take 1 capsule (300 mg total) by mouth daily at bedtime 5/13/19   Yes ZBIGNIEW Smith   ULORIC 40 MG tablet   3/31/19   Yes Historical Provider, MD   Ascorbic Acid (vitamin C) 1000 MG tablet Take 1,000 mg by mouth daily   10/24/22   Historical Provider, MD   cholecalciferol (VITAMIN D3) 1,000 units tablet Take 1,000 Units by mouth daily   10/24/22   Historical Provider, MD   colchicine (COLCRYS) 0 6 mg tablet Take 1 tablet (0 6 mg total) by mouth daily as needed for muscle/joint pain 5/13/19 10/24/22   ZBIGNIEW Smith   diclofenac sodium (VOLTAREN) 50 mg EC tablet Take 50 mg by mouth 2 (two) times a day   10/24/22   Historical Provider, MD   ibuprofen (MOTRIN) 600 mg tablet Take 1 tablet (600 mg total) by mouth every 6 (six) hours for 6 days  Patient not taking: Reported on 3/29/2021 5/13/19 10/24/22   ZBIGNIEW Tirado   indomethacin (INDOCIN) 50 mg capsule take 1 capsule by oral route 3 times every day with food PRN 2/15/21 10/24/22   Historical Provider, MD   methocarbamol (ROBAXIN) 500 mg tablet Take 1 tablet (500 mg total) by mouth daily at bedtime as needed for muscle spasms 10/23/22 10/24/22   Rich Deng DO   Pseudoephedrine-APAP-DM (TRIAMINIC PO) Take by mouth   10/24/22   Historical Provider, MD   Zinc 100 MG TABS Take by mouth   10/24/22   Historical Provider, MD      I have reviewed home medications with patient personally      Allergies: Allergies   Allergen Reactions   • Morphine Shortness Of Breath   • Oxycodone Shortness Of Breath       Pt tolerated this in the past without SOB   • Cortisone Diarrhea, Abdominal Pain and Fever         Social History:     Marital Status: /Civil Union   Occupation:   Patient Pre-hospital Living Situation: Lives with wife  Patient Pre-hospital Level of Mobility: Unable to ambulate  Patient Pre-hospital Diet Restrictions: None  Substance Use History:   Social History          Substance and Sexual Activity   Alcohol Use Never      Social History          Tobacco Use   Smoking Status Never Smoker   Smokeless Tobacco Never Used      Social History          Substance and Sexual Activity   Drug Use No         Family History:     non-contributory     Physical Exam:      Vitals:   Blood Pressure: (!) 144/108 (10/24/22 1828)  Pulse: 66 (10/24/22 1828)  Temperature: 97 8 °F (36 6 °C) (10/24/22 1828)  Temp Source: Temporal (10/24/22 1417)  Respirations: 15 (10/24/22 1828)  Height: 6' 1" (185 4 cm) (10/24/22 1824)  Weight - Scale: (!) 142 kg (313 lb 4 4 oz) (10/24/22 1824)  SpO2: 97 % (10/24/22 1828)     Physical Exam  Constitutional:       Appearance: He is obese  HENT:      Head: Normocephalic        Right Ear: External ear normal       Left Ear: External ear normal       Nose: Nose normal       Mouth/Throat: Pharynx: Oropharynx is clear  Eyes:      Extraocular Movements: Extraocular movements intact  Conjunctiva/sclera: Conjunctivae normal    Cardiovascular:      Rate and Rhythm: Normal rate and regular rhythm  Pulses: Normal pulses  Heart sounds: Normal heart sounds  Pulmonary:      Effort: Pulmonary effort is normal       Breath sounds: Normal breath sounds  Abdominal:      General: Bowel sounds are normal       Palpations: Abdomen is soft  Musculoskeletal:         General: Normal range of motion  Comments: Straight leg test limited by pain  Pain in hip and does not radiate down leg  Patient reports radiating pain when ambulating  Skin:     General: Skin is warm  Capillary Refill: Capillary refill takes less than 2 seconds  Neurological:      General: No focal deficit present  Mental Status: Mental status is at baseline     Psychiatric:         Mood and Affect: Mood normal          Behavior: Behavior normal             Additional Data:      Lab Results: I have personally reviewed pertinent reports             Results from last 7 days   Lab Units 10/24/22  1626   WBC Thousand/uL 7 44   HEMOGLOBIN g/dL 15 1   HEMATOCRIT % 44 3   PLATELETS Thousands/uL 207   NEUTROS PCT % 59   LYMPHS PCT % 27   MONOS PCT % 11   EOS PCT % 2           Results from last 7 days   Lab Units 10/24/22  1626   SODIUM mmol/L 137   POTASSIUM mmol/L 4 4   CHLORIDE mmol/L 102   CO2 mmol/L 30   BUN mg/dL 18   CREATININE mg/dL 1 00   ANION GAP mmol/L 5   CALCIUM mg/dL 8 9   GLUCOSE RANDOM mg/dL 99                         Imaging: I have personally reviewed pertinent reports        CT abdomen pelvis wo contrast   Final Result by Mynor Erickson MD (10/24 1627)       No acute findings in the abdomen or the pelvis                Workstation performed: [id number]           CT recon only lumbar spine (no charge)   Final Result by Sajan Mays MD (10/24 1618)       No acute osseous abnormality of lumbar spine          Degenerative changes, as detailed above        Please see same-day CT abdomen pelvis without contrast for further evaluation            Workstation performed: [id number]           MRI inpatient order    (Results Pending)         EKG, Pathology, and Other Studies Reviewed on Admission:   · EKG: NSR     Allscripts / Epic Records Reviewed:  No

## 2022-10-25 VITALS
WEIGHT: 313.27 LBS | TEMPERATURE: 97.6 F | DIASTOLIC BLOOD PRESSURE: 95 MMHG | SYSTOLIC BLOOD PRESSURE: 139 MMHG | OXYGEN SATURATION: 93 % | BODY MASS INDEX: 41.52 KG/M2 | RESPIRATION RATE: 15 BRPM | HEIGHT: 73 IN | HEART RATE: 86 BPM

## 2022-10-25 LAB
ALBUMIN SERPL BCP-MCNC: 3.8 G/DL (ref 3.5–5)
ALP SERPL-CCNC: 70 U/L (ref 46–116)
ALT SERPL W P-5'-P-CCNC: 39 U/L (ref 12–78)
ANION GAP SERPL CALCULATED.3IONS-SCNC: 10 MMOL/L (ref 4–13)
AST SERPL W P-5'-P-CCNC: 19 U/L (ref 5–45)
BASOPHILS # BLD AUTO: 0.01 THOUSANDS/ÂΜL (ref 0–0.1)
BASOPHILS NFR BLD AUTO: 0 % (ref 0–1)
BILIRUB SERPL-MCNC: 0.44 MG/DL (ref 0.2–1)
BUN SERPL-MCNC: 19 MG/DL (ref 5–25)
CALCIUM SERPL-MCNC: 9.3 MG/DL (ref 8.3–10.1)
CHLORIDE SERPL-SCNC: 99 MMOL/L (ref 96–108)
CO2 SERPL-SCNC: 26 MMOL/L (ref 21–32)
CREAT SERPL-MCNC: 0.93 MG/DL (ref 0.6–1.3)
DME PARACHUTE DELIVERY DATE REQUESTED: NORMAL
DME PARACHUTE ITEM DESCRIPTION: NORMAL
DME PARACHUTE ORDER STATUS: NORMAL
DME PARACHUTE SUPPLIER NAME: NORMAL
DME PARACHUTE SUPPLIER PHONE: NORMAL
EOSINOPHIL # BLD AUTO: 0.01 THOUSAND/ÂΜL (ref 0–0.61)
EOSINOPHIL NFR BLD AUTO: 0 % (ref 0–6)
ERYTHROCYTE [DISTWIDTH] IN BLOOD BY AUTOMATED COUNT: 12.5 % (ref 11.6–15.1)
GFR SERPL CREATININE-BSD FRML MDRD: 87 ML/MIN/1.73SQ M
GLUCOSE SERPL-MCNC: 144 MG/DL (ref 65–140)
HCT VFR BLD AUTO: 46.4 % (ref 36.5–49.3)
HGB BLD-MCNC: 16.1 G/DL (ref 12–17)
IMM GRANULOCYTES # BLD AUTO: 0.02 THOUSAND/UL (ref 0–0.2)
IMM GRANULOCYTES NFR BLD AUTO: 0 % (ref 0–2)
LYMPHOCYTES # BLD AUTO: 0.67 THOUSANDS/ÂΜL (ref 0.6–4.47)
LYMPHOCYTES NFR BLD AUTO: 9 % (ref 14–44)
MAGNESIUM SERPL-MCNC: 1.9 MG/DL (ref 1.6–2.6)
MCH RBC QN AUTO: 29.7 PG (ref 26.8–34.3)
MCHC RBC AUTO-ENTMCNC: 34.7 G/DL (ref 31.4–37.4)
MCV RBC AUTO: 86 FL (ref 82–98)
MONOCYTES # BLD AUTO: 0.11 THOUSAND/ÂΜL (ref 0.17–1.22)
MONOCYTES NFR BLD AUTO: 2 % (ref 4–12)
NEUTROPHILS # BLD AUTO: 6.53 THOUSANDS/ÂΜL (ref 1.85–7.62)
NEUTS SEG NFR BLD AUTO: 89 % (ref 43–75)
NRBC BLD AUTO-RTO: 0 /100 WBCS
PHOSPHATE SERPL-MCNC: 3.2 MG/DL (ref 2.3–4.1)
PLATELET # BLD AUTO: 244 THOUSANDS/UL (ref 149–390)
PMV BLD AUTO: 9.8 FL (ref 8.9–12.7)
POTASSIUM SERPL-SCNC: 4.4 MMOL/L (ref 3.5–5.3)
PROT SERPL-MCNC: 7.3 G/DL (ref 6.4–8.4)
RBC # BLD AUTO: 5.42 MILLION/UL (ref 3.88–5.62)
SODIUM SERPL-SCNC: 135 MMOL/L (ref 135–147)
T4 FREE SERPL-MCNC: 1.2 NG/DL (ref 0.76–1.46)
URATE SERPL-MCNC: 4.7 MG/DL (ref 3.5–8.5)
WBC # BLD AUTO: 7.35 THOUSAND/UL (ref 4.31–10.16)

## 2022-10-25 PROCEDURE — 97163 PT EVAL HIGH COMPLEX 45 MIN: CPT

## 2022-10-25 PROCEDURE — 97167 OT EVAL HIGH COMPLEX 60 MIN: CPT

## 2022-10-25 PROCEDURE — 85025 COMPLETE CBC W/AUTO DIFF WBC: CPT

## 2022-10-25 PROCEDURE — 80053 COMPREHEN METABOLIC PANEL: CPT

## 2022-10-25 PROCEDURE — 84100 ASSAY OF PHOSPHORUS: CPT

## 2022-10-25 PROCEDURE — 99217 PR OBSERVATION CARE DISCHARGE MANAGEMENT: CPT | Performed by: INTERNAL MEDICINE

## 2022-10-25 PROCEDURE — 84550 ASSAY OF BLOOD/URIC ACID: CPT

## 2022-10-25 PROCEDURE — 84439 ASSAY OF FREE THYROXINE: CPT | Performed by: INTERNAL MEDICINE

## 2022-10-25 PROCEDURE — 83735 ASSAY OF MAGNESIUM: CPT

## 2022-10-25 RX ORDER — HYDROMORPHONE HCL/PF 1 MG/ML
1 SYRINGE (ML) INJECTION EVERY 4 HOURS PRN
Status: DISCONTINUED | OUTPATIENT
Start: 2022-10-25 | End: 2022-10-25 | Stop reason: HOSPADM

## 2022-10-25 RX ORDER — ACETAMINOPHEN 325 MG/1
650 TABLET ORAL EVERY 6 HOURS PRN
Qty: 30 TABLET | Refills: 2 | Status: SHIPPED | OUTPATIENT
Start: 2022-10-25

## 2022-10-25 RX ORDER — HYDROMORPHONE HYDROCHLORIDE 2 MG/1
2 TABLET ORAL EVERY 4 HOURS PRN
Qty: 15 TABLET | Refills: 0 | Status: SHIPPED | OUTPATIENT
Start: 2022-10-25

## 2022-10-25 RX ADMIN — METHYLPREDNISOLONE SODIUM SUCCINATE 40 MG: 40 INJECTION, POWDER, FOR SOLUTION INTRAMUSCULAR; INTRAVENOUS at 05:02

## 2022-10-25 RX ADMIN — TRAMADOL HYDROCHLORIDE 50 MG: 50 TABLET, COATED ORAL at 14:07

## 2022-10-25 RX ADMIN — HYDROMORPHONE HYDROCHLORIDE 0.5 MG: 1 INJECTION, SOLUTION INTRAMUSCULAR; INTRAVENOUS; SUBCUTANEOUS at 08:10

## 2022-10-25 RX ADMIN — HYDROMORPHONE HYDROCHLORIDE 0.5 MG: 1 INJECTION, SOLUTION INTRAMUSCULAR; INTRAVENOUS; SUBCUTANEOUS at 03:38

## 2022-10-25 RX ADMIN — ENOXAPARIN SODIUM 40 MG: 40 INJECTION SUBCUTANEOUS at 08:08

## 2022-10-25 RX ADMIN — DOCUSATE SODIUM 100 MG: 100 CAPSULE, LIQUID FILLED ORAL at 08:08

## 2022-10-25 RX ADMIN — ALLOPURINOL 200 MG: 100 TABLET ORAL at 08:08

## 2022-10-25 NOTE — CASE MANAGEMENT
Case Management Discharge Planning Note    Patient name Kayode Palomino  Location [facility] 641/[id number] MRN [id number]  : 1960 Date 10/25/2022       Current Admission Date: 10/24/2022  Current Admission Diagnosis:Lumbar back pain with radiculopathy affecting left lower extremity   Patient Active Problem List    Diagnosis Date Noted   • Gout 10/24/2022   • Elevated TSH 10/24/2022   • Lumbar back pain with radiculopathy affecting left lower extremity 2019   • Degenerative disc disease at L5-S1 level 2019   • Uric acid urolithiasis 2019      LOS (days): 0  Geometric Mean LOS (GMLOS) (days):   Days to GMLOS:     OBJECTIVE:            Current admission status: Observation   Preferred Pharmacy:   [facility]/pharmacy #[id number] [id number]  Phone: [phone] Fax: [phone]    Primary Care Provider: Derrick Joe MD    Primary Insurance: Brookejamar Moreno  Secondary Insurance:     DISCHARGE DETAILS:  Pt is being dc'd home on this date with OP follow up; will be following up with OP PT in [address] [place] [address] and therapy providing pt with a bariatric walker from [facility] (order put in to [address] via [place])

## 2022-10-25 NOTE — PLAN OF CARE
Problem: PHYSICAL THERAPY ADULT  Goal: Performs mobility at highest level of function for planned discharge setting  See evaluation for individualized goals  Description: Treatment/Interventions: Functional transfer training, LE strengthening/ROM, Therapeutic exercise, Endurance training, Bed mobility, Gait training          See flowsheet documentation for full assessment, interventions and recommendations  Note: Prognosis: Good  Problem List: Decreased strength, Decreased endurance, Impaired balance, Decreased mobility, Pain, Obesity  Assessment: Patient is a 58 y o  male evaluated by Physical Therapy s/p admit to [facility] [place] on 10/24/2022 with admitting diagnosis of: Sciatica, Ambulatory dysfunction, and principal problem of: Lumbar back pain with radiculopathy affecting left lower extremity  PT was consulted to assess patient's functional mobility and discharge needs  Ordered are PT Evaluation and treatment with activity level of: up with assistance  Comorbidities affecting patient's physical performance at time of assessment include: hx of gout, back injury, basal cell carcinoma  Personal factors affecting the patient at time of IE include: lives in 2 story home, ambulating with assistive device, inability to navigate community distances, inability/difficulty performing IADLs and inability/difficulty performing ADLs  Please locate objective findings from PT assessment regarding body systems outlined above  Upon evaluation, pt able to perform all functional mobility with SUP, RW, and increased time  Occasional verbal cuing provided for safety awareness and sequencing  Pt attempting mobility initially without device, however was unable to safely bear weight through L LE  When provided with RW, pt able to disperse weight through B UEs and successfully ambulate to/from bathroom  Seated rest break taken following d/t persistent pain but no true LOB experienced   HR and SpO2 remained WFL on RA throughout  Pt appears very motivated to participate in PT intervention and is agreeable to discharging home with RW  The patient's AM-PAC Basic Mobility Inpatient Short Form Raw Score is 18  A Raw score of greater than 16 suggests the patient may benefit from discharge to home  Please also refer to the recommendation of the Physical Therapist for safe discharge planning  Co treatment with OT secondary to complex medical condition of pt, possible A of 2 required to achieve and maintain transitional movements, requiring the need of skilled therapeutic intervention of 2 therapists to achieve delivery of services  Pt will benefit from continued PT intervention during LOS to address current deficits, increase LOF, and facilitate safe d/c to next level of care when medically appropriate  D/c recommendation at this time is home with outpatient rehabilitation  PT Discharge Recommendation: Home with outpatient rehabilitation    See flowsheet documentation for full assessment

## 2022-10-25 NOTE — ASSESSMENT & PLAN NOTE
2-3 weeks of worsening severe acute lower back pain with radiating pain down leg when patient walks  Patient unable to ambulate  Imaging demonstrates L4-L5 moderate-severe foraminal narrowing  No saddle anethesia, weakness, bowel or bladder incontinence       Plan  -IV methylprednisolone 40mg  -Dilaudid 1 mg Q4 PRN  -PT/OT evaluation  -MRI of L spine and hip

## 2022-10-25 NOTE — PROGRESS NOTES
[address]  Progress Note - Don Vega 1960, 58 y o  male MRN: [id number]  Unit/Bed#: 408-01 Encounter: [id number]  Primary Care Provider: Irma King MD   Date and time admitted to hospital: 10/24/2022  2:09 PM    * Lumbar back pain with radiculopathy affecting left lower extremity  Assessment & Plan  2-3 weeks of worsening severe acute lower back pain with radiating pain down leg when patient walks  Patient unable to ambulate  Imaging demonstrates L4-L5 moderate-severe foraminal narrowing  No saddle anethesia, weakness, bowel or bladder incontinence  Plan  -IV methylprednisolone 40mg  -Dilaudid 1 mg Q4 PRN  -PT/OT evaluation  -MRI of L spine and hip    Gout  Assessment & Plan  Hx of gout  Last gout attack >10 years ago  Plan:    Allopurinol 200mg for maintenance  Elevated TSH  Assessment & Plan  · Check a free T4 level  · Recheck a TSH level in 1-2 weeks with his PCP    VTE Pharmacologic Prophylaxis:  Lovenox    Patient Centered Rounds:  Patient care rounds were performed with nursing    Discussions with Specialists or Other Care Team Provider:  Case Management, PT/OT    Education and Discussions with Family / Patient:  Spoke with patient at bedside    Time Spent for Care: 30  More than 50% of total time spent on counseling and coordination of care as described above  Current Length of Stay: 0 day(s)    Current Patient Status: Observation     Certification Statement: The patient will continue to require additional inpatient hospital stay due to ambulatory dysfunction secondary to severe low back pain pending MRI    Discharge Plan:  Pending PT/OT evaluation and treatment    Code Status: Level 1 - Full Code      Subjective:   Patient seen and examined at bedside  No acute events overnight  Denies chest pain, shortness of breath, diaphoresis, nausea/vomiting/diarrhea, fevers/chills  Endorses severe low back pain  MRI lumbar spine pending    PT/OT evaluation and treatment pending  Objective:     Vitals:   Temp (24hrs), Av 7 °F (36 5 °C), Min:97 4 °F (36 3 °C), Max:97 9 °F (36 6 °C)    Temp:  [97 4 °F (36 3 °C)-97 9 °F (36 6 °C)] 97 6 °F (36 4 °C)  HR:  [66-86] 86  Resp:  [15-20] 15  BP: (139-175)/() 139/95  SpO2:  [93 %-97 %] 93 %  Body mass index is 41 33 kg/m²  Input and Output Summary (last 24 hours):     No intake or output data in the 24 hours ending 10/25/22 0859    Physical Exam:     Physical Exam  Vitals and nursing note reviewed  Constitutional:       Appearance: He is well-developed  He is obese  HENT:      Head: Normocephalic and atraumatic  Eyes:      Conjunctiva/sclera: Conjunctivae normal    Cardiovascular:      Rate and Rhythm: Normal rate and regular rhythm  Heart sounds: No murmur heard  Pulmonary:      Effort: Pulmonary effort is normal  No respiratory distress  Breath sounds: Normal breath sounds  Abdominal:      Palpations: Abdomen is soft  Tenderness: There is no abdominal tenderness  Musculoskeletal:      Cervical back: Neck supple  Skin:     General: Skin is warm and dry  Neurological:      Mental Status: He is alert  Additional Data:     Labs:  I have reviewed pertinent results     Results from last 7 days   Lab Units 10/25/22  0441   WBC Thousand/uL 7 35   HEMOGLOBIN g/dL 16 1   HEMATOCRIT % 46 4   PLATELETS Thousands/uL 244   NEUTROS PCT % 89*   LYMPHS PCT % 9*   MONOS PCT % 2*   EOS PCT % 0     Results from last 7 days   Lab Units 10/25/22  0441   SODIUM mmol/L 135   POTASSIUM mmol/L 4 4   CHLORIDE mmol/L 99   CO2 mmol/L 26   BUN mg/dL 19   CREATININE mg/dL 0 93   ANION GAP mmol/L 10   CALCIUM mg/dL 9 3   ALBUMIN g/dL 3 8   TOTAL BILIRUBIN mg/dL 0 44   ALK PHOS U/L 70   ALT U/L 39   AST U/L 19   GLUCOSE RANDOM mg/dL 144*                         Imaging: I have reviewed pertinent imaging       Recent Cultures (last 7 days):           Last 24 Hours Medication List:   Current Facility-Administered Medications   Medication Dose Route Frequency Provider Last Rate   • acetaminophen  650 mg Oral Q6H PRN Joseph Villa DO     • allopurinol  200 mg Oral Daily Sky Cohn MD     • aluminum-magnesium hydroxide-simethicone  30 mL Oral Q6H PRN Sky Cohn MD     • docusate sodium  100 mg Oral BID Sky Cohn MD     • enoxaparin  40 mg Subcutaneous Q12H [address] Joseph Villa DO     • gabapentin  300 mg Oral HS Sky Cohn MD     • HYDROmorphone  1 mg Intravenous Q4H PRN Daphne Vasquez DO     • methylPREDNISolone sodium succinate  40 mg Intravenous [facility] Joseph Villa DO     • ondansetron  4 mg Intravenous Q6H PRN Sky Cohn MD     • traMADol  50 mg Oral Q4H PRN Key Craig DO          Today, Patient Was Seen By: Daphne Vasquez    ** Please Note: Dictation voice to text software may have been used in the creation of this document   **

## 2022-10-25 NOTE — PLAN OF CARE
Problem: PAIN - ADULT  Goal: Verbalizes/displays adequate comfort level or baseline comfort level  Description: Interventions:  - Encourage patient to monitor pain and request assistance  - Assess pain using appropriate pain scale  - Administer analgesics based on type and severity of pain and evaluate response  - Implement non-pharmacological measures as appropriate and evaluate response  - Consider cultural and social influences on pain and pain management  - Notify physician/advanced practitioner if interventions unsuccessful or patient reports new pain  Outcome: Progressing     Problem: INFECTION - ADULT  Goal: Absence or prevention of progression during hospitalization  Description: INTERVENTIONS:  - Assess and monitor for signs and symptoms of infection  - Monitor lab/diagnostic results  - Monitor all insertion sites, i e  indwelling lines, tubes, and drains  - Monitor endotracheal if appropriate and nasal secretions for changes in amount and color  - [place] appropriate cooling/warming therapies per order  - Administer medications as ordered  - Instruct and encourage patient and family to use good hand hygiene technique  - Identify and instruct in appropriate isolation precautions for identified infection/condition  Outcome: Progressing     Problem: SAFETY ADULT  Goal: Patient will remain free of falls  Description: INTERVENTIONS:  - Educate patient/family on patient safety including physical limitations  - Instruct patient to call for assistance with activity   - Consult OT/PT to assist with strengthening/mobility   - Keep Call bell within reach  - Keep bed low and locked with side rails adjusted as appropriate  - Keep care items and personal belongings within reach  - Initiate and maintain comfort rounds  - Make Fall Risk Sign visible to staff  Outcome: Progressing  Goal: Maintain or return to baseline ADL function  Description: INTERVENTIONS:  -  Assess patient's ability to carry out ADLs; assess patient's baseline for ADL function and identify physical deficits which impact ability to perform ADLs (bathing, care of mouth/teeth, toileting, grooming, dressing, etc )  - Assess/evaluate cause of self-care deficits   - Assess range of motion  - Assess patient's mobility; develop plan if impaired  - Assess patient's need for assistive devices and provide as appropriate  - Encourage maximum independence but intervene and supervise when necessary  - Involve family in performance of ADLs  - Assess for home care needs following discharge   - Consider OT consult to assist with ADL evaluation and planning for discharge  - Provide patient education as appropriate  Outcome: Progressing  Goal: Maintains/Returns to pre admission functional level  Description: INTERVENTIONS:  - Perform BMAT or MOVE assessment daily    - Set and communicate daily mobility goal to care team and patient/family/caregiver     - Collaborate with rehabilitation services on mobility goals if consulted  - Ambulate patient 3 times a day  - Out of bed to chair 3 times a day   - Out of bed for meals 3 times a day  - Out of bed for toileting  - Record patient progress and toleration of activity level   Outcome: Progressing     Problem: DISCHARGE PLANNING  Goal: Discharge to home or other facility with appropriate resources  Description: INTERVENTIONS:  - Identify barriers to discharge w/patient and caregiver  - Arrange for needed discharge resources and transportation as appropriate  - Identify discharge learning needs (meds, wound care, etc )  - Arrange for interpretive services to assist at discharge as needed  - Refer to Case Management Department for coordinating discharge planning if the patient needs post-hospital services based on physician/advanced practitioner order or complex needs related to functional status, cognitive ability, or social support system  Outcome: Progressing     Problem: Knowledge Deficit  Goal: Patient/family/caregiver demonstrates understanding of disease process, treatment plan, medications, and discharge instructions  Description: Complete learning assessment and assess knowledge base    Interventions:  - Provide teaching at level of understanding  - Provide teaching via preferred learning methods  Outcome: Progressing     Problem: MUSCULOSKELETAL - ADULT  Goal: Maintain or return mobility to safest level of function  Description: INTERVENTIONS:  - Assess patient's ability to carry out ADLs; assess patient's baseline for ADL function and identify physical deficits which impact ability to perform ADLs (bathing, care of mouth/teeth, toileting, grooming, dressing, etc )  - Assess/evaluate cause of self-care deficits   - Assess range of motion  - Assess patient's mobility  - Assess patient's need for assistive devices and provide as appropriate  - Encourage maximum independence but intervene and supervise when necessary  - Involve family in performance of ADLs  - Assess for home care needs following discharge   - Consider OT consult to assist with ADL evaluation and planning for discharge  - Provide patient education as appropriate  Outcome: Progressing     Problem: MOBILITY - ADULT  Goal: Maintain or return to baseline ADL function  Description: INTERVENTIONS:  -  Assess patient's ability to carry out ADLs; assess patient's baseline for ADL function and identify physical deficits which impact ability to perform ADLs (bathing, care of mouth/teeth, toileting, grooming, dressing, etc )  - Assess/evaluate cause of self-care deficits   - Assess range of motion  - Assess patient's mobility; develop plan if impaired  - Assess patient's need for assistive devices and provide as appropriate  - Encourage maximum independence but intervene and supervise when necessary  - Involve family in performance of ADLs  - Assess for home care needs following discharge   - Consider OT consult to assist with ADL evaluation and planning for discharge  - Provide patient education as appropriate  Outcome: Progressing  Goal: Maintains/Returns to pre admission functional level  Description: INTERVENTIONS:  - Perform BMAT or MOVE assessment daily    - Set and communicate daily mobility goal to care team and patient/family/caregiver     - Collaborate with rehabilitation services on mobility goals if consulted  - Ambulate patient 3 times a day  - Out of bed to chair 3 times a day   - Out of bed for meals 3 times a day  - Out of bed for toileting  - Record patient progress and toleration of activity level   Outcome: Progressing

## 2022-10-25 NOTE — CASE MANAGEMENT
Case Management Assessment & Discharge Planning Note    Patient name Leonarda Ibanez  Location [facility] 196/770-27 MRN [id number]  : 1960 Date 10/25/2022       Current Admission Date: 10/24/2022  Current Admission Diagnosis:Lumbar back pain with radiculopathy affecting left lower extremity   Patient Active Problem List    Diagnosis Date Noted   • Gout 10/24/2022   • Elevated TSH 10/24/2022   • Lumbar back pain with radiculopathy affecting left lower extremity 2019   • Degenerative disc disease at L5-S1 level 2019   • Uric acid urolithiasis 2019      LOS (days): 0  Geometric Mean LOS (GMLOS) (days):   Days to GMLOS:     OBJECTIVE:              Current admission status: Observation       Preferred Pharmacy:   [facility]/pharmacy [id number] [address]  Phone: [phone] Fax: [phone]    Primary Care Provider: Lillian Brand MD    Primary Insurance: Eden Buckley  Secondary Insurance:     ASSESSMENT:  Huy Mooney Proxies    There are no active Health Care Proxies on file  Advance Directives  Does patient have a [address]?: No  Was patient offered paperwork?: Yes (declines)  Does patient currently have a Health Care decision maker?: Yes, please see Health Care Proxy section  Does patient have Advance Directives?: No  Was patient offered paperwork?: Yes (declines)  Primary Contact: Cayla Han (Spouse)   [phone] (H)         Readmission Root Cause  30 Day Readmission: No    Patient Information  Admitted from[de-identified] Home  Mental Status: Alert  During Assessment patient was accompanied by: Not accompanied during assessment  Assessment information provided by[de-identified] Patient  Primary Caregiver: Self  Support Systems: Spouse/significant other  [place] of Residence: [address] do [address] live in?: [address] entry access options   Select all that apply : No steps to enter home  Type of Current Residence: 3 story home  Upon entering residence, is there a bedroom on the main floor (no further steps)?: No (has been sleeping in his recliner on the 1st floor)  A bedroom is located on the following floor levels of residence (select all that apply):: 2nd Floor  Upon entering residence, is there a bathroom on the main floor (no further steps)?: Yes (full bath on 1st floor and 2nd floor)  Number of steps to 2nd floor from main floor: One Flight  In the last 12 months, was there a time when you were not able to pay the mortgage or rent on time?: No  In the last 12 months, how many places have you lived?: 1  In the last 12 months, was there a time when you did not have a steady place to sleep or slept in a shelter (including now)?: No  Homeless/housing insecurity resource given?: N/A  Living Arrangements: Lives w/ Spouse/significant other  Is patient a ?: No    Activities of Daily Living Prior to Admission  Functional Status: Independent  Completes ADLs independently?: Yes  Ambulates independently?: Yes  Does patient use assisted devices?: No  Does patient currently own DME?: No  Does patient have a history of Outpatient Therapy (PT/OT)?: No  Does the patient have a history of Short-Term Rehab?: No  Does patient have a history of HH?: No  [place] [id number] No         Patient Information Continued  Income Source: Employed  Does patient have prescription coverage?: Yes  Within the past 12 months, you worried that your food would run out before you got the money to buy more : Never true  Within the past 12 months, the food you bought just didn't last and you didn't have money to get more : Never true  Food insecurity resource given?: N/A  Does patient receive dialysis treatments?: No  Does patient have a history of substance abuse?: No  Does patient have a history of Mental Health Diagnosis?: No         Means of Transportation  Means of Transport to Appts[de-identified] Drives Self  In the past 12 months, has lack of transportation kept you from medical appointments or from getting medications?: No  In the past 12 months, has lack of transportation kept you from meetings, work, or from getting things needed for daily living?: No  Was application for public transport provided?: N/A        DISCHARGE DETAILS:    Discharge planning discussed with[de-identified] patient  Freedom of Choice: Yes  Comments - Freedom of Choice: referral [employer] for bariatric walker  CM contacted family/caregiver?: No- see comments (declines)  Were Treatment Team discharge recommendations reviewed with patient/caregiver?: Yes  Did patient/caregiver verbalize understanding of patient care needs?: Yes  Were patient/caregiver advised of the risks associated with not following Treatment Team discharge recommendations?: Yes              DME Referral Provided  Referral made for DME?: Yes  DME referral completed for the following items[de-identified] Danielle Hinton  DME Supplier Name[de-identified] [employer]         Would you like to participate in our [facility] [address] service program?  : No - Declined       Discharge Destination Plan[de-identified] Home (with OP follow up and OP therapy)  Transport at Discharge : Family      Plans at this time are home on dc with OP follow up  Therapy recommending OP Therapy as well as a bariatric walker  Order put in [facility] for a bariatric walker-therapy to provide from Letha  And pt will be going to [address] [place] [address] in [address] [place] [address] for OP therapy and he will call and schedule

## 2022-10-25 NOTE — NURSING NOTE
Pt discharged to home  D/c instructions given and reviewed with pt and wife  Both verbalized understanding  Pt left via wheelchair

## 2022-10-25 NOTE — PLAN OF CARE
Problem: PAIN - ADULT  Goal: Verbalizes/displays adequate comfort level or baseline comfort level  Description: Interventions:  - Encourage patient to monitor pain and request assistance  - Assess pain using appropriate pain scale  - Administer analgesics based on type and severity of pain and evaluate response  - Implement non-pharmacological measures as appropriate and evaluate response  - Consider cultural and social influences on pain and pain management  - Notify physician/advanced practitioner if interventions unsuccessful or patient reports new pain  Outcome: Progressing     Problem: INFECTION - ADULT  Goal: Absence or prevention of progression during hospitalization  Description: INTERVENTIONS:  - Assess and monitor for signs and symptoms of infection  - Monitor lab/diagnostic results  - Monitor all insertion sites, i e  indwelling lines, tubes, and drains  - Monitor endotracheal if appropriate and nasal secretions for changes in amount and color  - [place] appropriate cooling/warming therapies per order  - Administer medications as ordered  - Instruct and encourage patient and family to use good hand hygiene technique  - Identify and instruct in appropriate isolation precautions for identified infection/condition  Outcome: Progressing     Problem: SAFETY ADULT  Goal: Patient will remain free of falls  Description: INTERVENTIONS:  - Educate patient/family on patient safety including physical limitations  - Instruct patient to call for assistance with activity   - Consult OT/PT to assist with strengthening/mobility   - Keep Call bell within reach  - Keep bed low and locked with side rails adjusted as appropriate  - Keep care items and personal belongings within reach  - Initiate and maintain comfort rounds  - Make Fall Risk Sign visible to staff  Outcome: Progressing  Goal: Maintain or return to baseline ADL function  Description: INTERVENTIONS:  -  Assess patient's ability to carry out ADLs; assess patient's baseline for ADL function and identify physical deficits which impact ability to perform ADLs (bathing, care of mouth/teeth, toileting, grooming, dressing, etc )  - Assess/evaluate cause of self-care deficits   - Assess range of motion  - Assess patient's mobility; develop plan if impaired  - Assess patient's need for assistive devices and provide as appropriate  - Encourage maximum independence but intervene and supervise when necessary  - Involve family in performance of ADLs  - Assess for home care needs following discharge   - Consider OT consult to assist with ADL evaluation and planning for discharge  - Provide patient education as appropriate  Outcome: Progressing  Goal: Maintains/Returns to pre admission functional level  Description: INTERVENTIONS:  - Perform BMAT or MOVE assessment daily    - Set and communicate daily mobility goal to care team and patient/family/caregiver     - Collaborate with rehabilitation services on mobility goals if consulted  - Ambulate patient 3 times a day  - Out of bed to chair 3 times a day   - Out of bed for meals 3 times a day  - Out of bed for toileting  - Record patient progress and toleration of activity level   Outcome: Progressing     Problem: DISCHARGE PLANNING  Goal: Discharge to home or other facility with appropriate resources  Description: INTERVENTIONS:  - Identify barriers to discharge w/patient and caregiver  - Arrange for needed discharge resources and transportation as appropriate  - Identify discharge learning needs (meds, wound care, etc )  - Arrange for interpretive services to assist at discharge as needed  - Refer to Case Management Department for coordinating discharge planning if the patient needs post-hospital services based on physician/advanced practitioner order or complex needs related to functional status, cognitive ability, or social support system  Outcome: Progressing     Problem: Knowledge Deficit  Goal: Patient/family/caregiver demonstrates understanding of disease process, treatment plan, medications, and discharge instructions  Description: Complete learning assessment and assess knowledge base    Interventions:  - Provide teaching at level of understanding  - Provide teaching via preferred learning methods  Outcome: Progressing     Problem: MUSCULOSKELETAL - ADULT  Goal: Maintain or return mobility to safest level of function  Description: INTERVENTIONS:  - Assess patient's ability to carry out ADLs; assess patient's baseline for ADL function and identify physical deficits which impact ability to perform ADLs (bathing, care of mouth/teeth, toileting, grooming, dressing, etc )  - Assess/evaluate cause of self-care deficits   - Assess range of motion  - Assess patient's mobility  - Assess patient's need for assistive devices and provide as appropriate  - Encourage maximum independence but intervene and supervise when necessary  - Involve family in performance of ADLs  - Assess for home care needs following discharge   - Consider OT consult to assist with ADL evaluation and planning for discharge  - Provide patient education as appropriate  Outcome: Progressing     Problem: MOBILITY - ADULT  Goal: Maintain or return to baseline ADL function  Description: INTERVENTIONS:  -  Assess patient's ability to carry out ADLs; assess patient's baseline for ADL function and identify physical deficits which impact ability to perform ADLs (bathing, care of mouth/teeth, toileting, grooming, dressing, etc )  - Assess/evaluate cause of self-care deficits   - Assess range of motion  - Assess patient's mobility; develop plan if impaired  - Assess patient's need for assistive devices and provide as appropriate  - Encourage maximum independence but intervene and supervise when necessary  - Involve family in performance of ADLs  - Assess for home care needs following discharge   - Consider OT consult to assist with ADL evaluation and planning for discharge  - Provide patient education as appropriate  Outcome: Progressing  Goal: Maintains/Returns to pre admission functional level  Description: INTERVENTIONS:  - Perform BMAT or MOVE assessment daily    - Set and communicate daily mobility goal to care team and patient/family/caregiver     - Collaborate with rehabilitation services on mobility goals if consulted  - Ambulate patient 3 times a day  - Out of bed to chair 3 times a day   - Out of bed for meals 3 times a day  - Out of bed for toileting  - Record patient progress and toleration of activity level   Outcome: Progressing

## 2022-10-25 NOTE — DISCHARGE SUMMARY
[facility] [address]  Discharge- Duane Cram 1960, 58 y o  male MRN: [id number]  Unit/Bed#: 524-51 Encounter: [id number]  Primary Care Provider: Moody Hernandez MD   Date and time admitted to hospital: 10/24/2022  2:09 PM    * Lumbar back pain with radiculopathy affecting left lower extremity  Assessment & Plan  2-3 weeks of worsening severe acute lower back pain with radiating pain down leg when patient walks  Patient unable to ambulate  Imaging demonstrates L4-L5 moderate-severe foraminal narrowing  No saddle anethesia, weakness, bowel or bladder incontinence  Plan  -IV methylprednisolone 40mg  -Dilaudid 1 mg Q4 PRN  -PT/OT evaluation  -MRI of L spine and hip    Gout  Assessment & Plan  Hx of gout  Last gout attack >10 years ago  Plan:    Allopurinol 200mg for maintenance  Elevated TSH  Assessment & Plan  · Check a free T4 level  · Recheck a TSH level in 1-2 weeks with his PCP      Discharging Physician / Practitioner: Albertina Barber  PCP: Moody Hernandez MD  Admission Date:   Admission Orders (From admission, onward)     Ordered        10/24/22 1714  Place in Observation  Once                      Discharge Date: 10/25/22    Medical Problems             Resolved Problems  Date Reviewed: 10/25/2022   None                 Consultations During Hospital Stay:  Not applicable    Procedures Performed:  Not applicable    Significant Findings / Test Results:     CT abdomen pelvis wo contrast    Result Date: 10/24/2022  Impression: No acute findings in the abdomen or the pelvis  Workstation performed: [id number]     CT recon only lumbar spine (no charge)    Result Date: 10/24/2022  Impression: No acute osseous abnormality of lumbar spine  Degenerative changes, as detailed above  Please see same-day CT abdomen pelvis without contrast for further evaluation   Workstation performed: [id number]       Incidental Findings:  Not applicable    Test Results Pending at Discharge (will require follow up):  Not applicable     Outpatient Tests Requested:  MRI lumbar spine    Reason for Admission:  Low back pain    Hospital Course:     Mark Crawford is a 58 y  o  male with PMHx of LEONOR DVF presents with 2-3 weeks of worsening acute lower back pain that radiates to his legs while walking, now unable to ambulate  Patient denies inciting event  Patient denies numbness in his groin, difficulty controlling his ability to urinate or pass a bowel movement, weakness, F/C, N/V, recent illness  Patient is a   Reports good health otherwise  Patient was seen in ED on 10/23 for admitting complaint and given script for tramadol and robaxin without relief      MRI lumbar spine was not approved to be performed on the inpatient setting  PT/OT recommended outpatient physical therapy  The patient remained hemodynamically stable and saturating well on room air throughout his hospital course  I have sent prescriptions for acetaminophen and 15 tablets of 2 mg of oral dilaudid in the setting of severe low back pain  MRI lumbar spine without contrast was ordered and the patient was encouraged to obtain this in the outpatient setting  He likely has degenerative disc disease worsening in the L4-L5 spine secondary to being a  for many years  Case management arranged for outpatient PT  The patient was discharged in stable condition on 10/25/2022 to home with outpatient PT  He was strongly encouraged to resume all preadmission medications at all preadmission dosages  Condition at Discharge: stable     Discharge Day Visit / Exam:     Subjective: Patient seen and examined at bedside  No acute events overnight  Denies chest pain, SOB, diaphoresis, nausea/vomiting/diarrhea, fevers/chills       Vitals: Blood Pressure: 139/95 (10/25/22 0758)  Pulse: 86 (10/25/22 0758)  Temperature: 97 6 °F (36 4 °C) (10/25/22 0758)  Temp Source: Oral (10/24/22 3496)  Respirations: 15 (10/25/22 0758)  Height: 6' 1" (185 4 cm) (10/24/22 1824)  Weight - Scale: (!) 142 kg (313 lb 4 4 oz) (10/24/22 1824)  SpO2: 93 % (10/25/22 0758)     Exam:   Physical Exam  Vitals and nursing note reviewed  Constitutional:       Appearance: He is well-developed  He is obese  HENT:      Head: Normocephalic and atraumatic  Eyes:      Conjunctiva/sclera: Conjunctivae normal    Cardiovascular:      Rate and Rhythm: Normal rate and regular rhythm  Heart sounds: No murmur heard  Pulmonary:      Effort: Pulmonary effort is normal  No respiratory distress  Breath sounds: Normal breath sounds  Abdominal:      Palpations: Abdomen is soft  Tenderness: There is no abdominal tenderness  Musculoskeletal:      Cervical back: Neck supple  Skin:     General: Skin is warm and dry  Neurological:      Mental Status: He is alert  Discharge instructions/Information to patient and family:   See after visit summary for information provided to patient and family  Provisions for Follow-Up Care:  See after visit summary for information related to follow-up care and any pertinent home health orders  Disposition:     Home with outpatient PT  Resume pre-admission medications  Obtain MRI lumbar spine outpatient     Discharge Statement:  I spent 60 minutes discharging the patient  This time was spent on the day of discharge  I had direct contact with the patient on the day of discharge  Greater than 50% of the total time was spent examining patient, answering all patient questions, arranging and discussing plan of care with patient as well as directly providing post-discharge instructions  Additional time then spent on discharge activities  Discharge Medications:  See after visit summary for reconciled discharge medications provided to patient and family        ** Please Note: This note has been constructed using a voice recognition system **

## 2022-10-25 NOTE — PROGRESS NOTES
PT Evaluation     Today's date: 10/31/2022  Patient name: Neo Blackman  : 1960  MRN: [id number]  Referring provider: Vianney Boateng, PT  Dx:   Encounter Diagnosis     ICD-10-CM    1  Sciatica, unspecified laterality  M54 30    2  Ambulatory dysfunction  R26 2                   Assessment  Assessment details: The patient is a 59 y/o male who presents to PT with diagnosis of sciatica and ambulatory dysfunction  He has complaints of constant pain along L side lower back, into L hip and down LLE to his foot  He has complaints of intermittent N&T into his L thigh  He demonstrates deficits with decreased L/S and LLE ROM and strength, decreased flexibility, decreased postural awareness, difficulty sleeping, gait dysfunction with use of AD, difficulty with stair negotiation and pain with completing his ADLs and tasks at home  He remains I with most of his ADLs though he does get assist at times with tasks such as shoes and socks  Prior to this incident of pain he ambulated without AD  Currently he is using a RW for household and community distances  Slow jacquelin is noted along with decreased weight shift to LLE in stance phase  He has not been going the stairs since developing this pain, has been staying on his first floor  He is limited with both sitting and standing tolerances and frequently changes positions t/o the eval   Difficulty sleeping is noted, he only sleeps a few hours a night and has been sleeping on his recliner, has not been able to lay in bed  No TTP is noted along his lower back  Unable to tolerate any special testing today secondary to high levels of pain  He is currently off work  Secondary to pain and above deficits he is limited with his overall mobility and function  The patient would benefit from continued PT to address deficits and improve function    Tx to include ROM, stretching, strengthening, modalities, HEP, pt education, postural ed, lifting/body mechanics, neuro re-ed, balance/proprioception Te, MT and equipment  Impairments: abnormal gait, abnormal or restricted ROM, activity intolerance, impaired physical strength, lacks appropriate home exercise program, pain with function, weight-bearing intolerance, poor posture  and poor body mechanics  Other impairment: decreased flexibility  Functional limitations: difficulty sleeping, difficulty with transfersUnderstanding of Dx/Px/POC: good   Prognosis: good    Goals  STGs:  1  Initiate and complete HEP with verbal cues  2   Improve LLE ROM by 5-10 degrees in 2 weeks  3   Improve LLE strength by 1/2-1 grade in 4 weeks  4   Decrease LBP by > 25% in 4 weeks  5   Patient to report symptoms centralized to lower back in 2 weeks  LTGs:  1  Patient to be I with HEP in 6 weeks  2   Improve L/S ROM to WNL t/o in 6 weeks to improve function  3   Decrease LBP to < or = to 2-3/10 with activity in 6 weeks to improve function  4   Improve LLE strength to 5/5 t/o in 6 weeks to improve function  5   Improve LLE ROM to WNL t/o in 6 weeks to improve function  6   Postural control is improved to maximal level of function in 6 weeks  7   Stair negotiation is improved to maximal level of function in 6 weeks  8   Recreational performance is improved to maximal level of function in 6 weeks  9   ADL performance is improved to maximal level of function in 6 weeks  10   Work performance is improved to maximal level of function in 6 weeks  11   Patent to report improved sleep in 6 weeks  Plan  Plan details: Modalities and therapy interventions prn      Patient would benefit from: skilled physical therapy  Planned modality interventions: cryotherapy and thermotherapy: hydrocollator packs  Other planned modality interventions: IASTM  Planned therapy interventions: abdominal trunk stabilization, manual therapy, behavior modification, body mechanics training, neuromuscular re-education, patient education, postural training, self care, strengthening, stretching, therapeutic exercise, therapeutic activities, flexibility and home exercise program  Frequency: 2x week  Duration in weeks: 6  Plan of Care beginning date: 10/31/2022  Plan of Care expiration date: 2022  Treatment plan discussed with: patient        Subjective Evaluation    History of Present Illness  Mechanism of injury: The patient states that he developed pain the second week of October  Pain had developed in his L hip, he had thought he had a problem with his hip  He had pain for about ten days than had gone to the doctor  He did get a shot (pain shot and steroid shot) which did help  He had gone back to work  He had off two days and "just laid around "  He woke up  with pain and couldn't lift his LLE  [facility]  No testing was completed  They had given him pain medicine and sent him home  The next morning he went back to the ER because of the pain in his back and leg  They did a CT scan, per patient it showed compression at L4-L5  He was kept overnight and sent home the next day  He also had an MRI yesterday at [facility] but he did not get the results yet  He was also referred to OPPT and he now presents for his evaluation  His PCP is also recommending seeing a spine doctor and he is seeing an orthopedic doctor on  at [facility] in [facility]  The patient reports that his pain is constant  He notes that it starts along his L lower back, into his L hip, down L leg to his foot  No pain in his R leg  He also has numbness along his L thigh  He reports difficulty sleeping, only a few hours a night in his recliner      Pain  At best pain ratin  At worst pain rating: 10  Location: LBP - LLE to foot    Quality: dull ache, sharp and discomfort  Relieving factors: medications  Aggravating factors: walking and standing    Social Support  Steps to enter house: yes  1  Stairs in house: yes   Lives in: multiple-level home (Has been staying on the first floor)    Employment status: not working (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)    Diagnostic Tests  Abnormal MRI: Had done yesterday but did not get results yet  CT scan: abnormal (Per patient showed compression)  Patient Goals  Patient goals for therapy: increased motion, decreased pain and increased strength  Patient goal: "To get rid of the pain, get back to normal "          Objective     Concurrent Complaints  Positive for disturbed sleep  Postural Observations  Seated posture: poor        Palpation   Left   No palpable tenderness to the erector spinae, lumbar interspinals, lumbar paraspinals and quadratus lumborum  Right   No palpable tenderness to the erector spinae, lumbar interspinals, lumbar paraspinals and quadratus lumborum  Tenderness     Left Hip   No tenderness in the PSIS  Right Hip   No tenderness in the PSIS  Active Range of Motion     Lumbar   Flexion: Active lumbar flexion: 3" from floor   with pain  Extension:  with pain Restriction level: moderate  Left rotation:  with pain Restriction level: maximal  Right rotation:  with pain Restriction level: maximal  Left Hip   Flexion: 60 degrees with pain  Abduction: 10 degrees with pain    Right Hip   Flexion: 90 degrees   Abduction: 35 degrees     Right Knee   Flexion: WFL  Extension: [facility]    Additional Active Range of Motion Details  L SLR: Unable/PROM 15 degrees w/increased pain  R SLR: 50  L knee ROM is limited about 50% in supine secondary to pain  In seated position he is unable to fully extend his L knee       Strength/Myotome Testing     Left Hip   Planes of Motion   Flexion: 3-  Abduction: 3-  Adduction: 3  External rotation: 3-  Internal rotation: 3-    Right Hip   Planes of Motion   Flexion: WFL  Abduction: WFL  Adduction: WFL    Ambulation   Weight-Bearing Status   Assistive device used: two-wheeled walker    Additional Weight-Bearing Status Details  Currently using RW for household and community distances  Prior to pain ambulated without AD  Ambulation: Stairs   Ascend stairs: independent  Descend stairs: independent    Additional Stairs Ambulation Details  Has not done the steps in his house secondary to pain  Using walker for outside steps                    Precautions: h/o skin cancer, h/o LBP       Manuals 10/31       BLE                                Neuro Re-Ed         MTP/LTP        PPT        PPT w/march        PPT w/SLR        PPT w/knee fallouts                Postural Ed Posture, spine anatomy & pathology - ML       Ther Ex        NuStep        SLR x 3        Bridges        LTR        SKTC        S/L Hip Abd        Clamshells        Assess NABEEL/PPU        Ther Activity                        Gait Training                        Modalities        HP/CP prn

## 2022-10-25 NOTE — PLAN OF CARE
Problem: OCCUPATIONAL THERAPY ADULT  Goal: Performs self-care activities at highest level of function for planned discharge setting  See evaluation for individualized goals  Description: Treatment Interventions: ADL retraining, Functional transfer training, UE strengthening/ROM, Endurance training, Patient/family training, Activityengagement, Equipment evaluation/education          See flowsheet documentation for full assessment, interventions and recommendations  Note: Limitation: Decreased ADL status, Decreased UE strength, Decreased Safe judgement during ADL, Decreased endurance, Decreased self-care trans, Decreased high-level ADLs     Assessment: Pt is a 58 y o  male seen for OT evaluation s/p admit to [facility] 10/24/2022 w/ Lumbar back pain with radiculopathy affecting left lower extremity  Comorbidities affecting pt's functional performance at time of assessment include: gout, back injury, basal cell carcinoma, gout, elevated TSH, lumbar back pain wiht radiculopathy affecting L LE  Personal factors affecting pt at time of IE include:steps to enter environment, difficulty performing ADLS and difficulty performing IADLS   Prior to admission, pt was (I) with ADLs and IADLs with use of no device during mobility  Upon evaluation: Pt requires (S) level with use of RW during mobility 2* the following deficits impacting occupational performance: weakness, decreased strength, decreased balance, decreased tolerance, impaired initiation, decreased safety awareness and impaired interpersonal skills  Pt to benefit from continued skilled OT tx while in the hospital to address deficits as defined above and maximize level of functional independence w ADL's and functional mobility  Occupational Performance areas to address include: grooming, bathing/shower, toilet hygiene, dressing, functional mobility, community mobility and clothing management   The patient's raw score on the AM-PAC Daily Activity inpatient short form is 21, standardized score is 44 27, greater than 39 4  Patients at this level are likely to benefit from discharge to home  Please refer to the recommendation of the Occupational Therapist for safe discharge planning  Pt benefited from co-evaluation of skilled OT and PT therapists in order to most appropriately address functional deficits d/t extensive assistance required for safe functional mobility, decreased activity tolerance, and regression from functioning level prior to admission and/or onset of present illness  OT/PT objectives were addressed separately; please see PT note for specific goal areas targeted       OT Discharge Recommendation: Home with outpatient rehabilitation

## 2022-10-25 NOTE — OCCUPATIONAL THERAPY NOTE
Occupational Therapy Evaluation     Patient Name: Lan Shankar  [id number] Date: 10/25/2022  Problem List  Principal Problem:    Lumbar back pain with radiculopathy affecting left lower extremity  Active Problems:    Gout    Elevated TSH    Past Medical History  Past Medical History:   Diagnosis Date    Back injury     Basal cell carcinoma     Gout      Past Surgical History  Past Surgical History:   Procedure Laterality Date    ROTATOR CUFF REPAIR Right     with bicep repair    SKIN CANCER EXCISION      BCCA chest and back             10/25/22 6147   OT Last Visit   OT Visit Date 10/25/22   Note Type   Note type Evaluation   Pain Assessment   Pain Assessment Tool 0-10   Pain Score 6   Pain Location/Orientation Orientation: Lower; Location: Back   Restrictions/Precautions   Weight Bearing Precautions Per Order No   Other Precautions Fall Risk;Pain   Home Living   Type of [address] [place] [address] Multi-level;Bed/bath upstairs; Performs ADLs on one level; Able to live on main level with bedroom/bathroom; Other (Comment)  (0 ANU; FOS to 2nd c HR; pt is able to have 1st floor setup)   Bathroom Shower/Tub Walk-in shower   Bathroom Toilet Raised   Bathroom Equipment Grab bars in shower;Built-in shower seat   [address] Other (Comment)  (no DME)   Additional Comments pt reports no device at baseline during functional mobility   Prior Function   Level of [place] Independent with ADLs; Independent with IADLS; Independent with functional mobility   Lives With Spouse   IADLs Independent with driving   Falls in the last 6 months 0   Vocational Full time employment   Comments pt is a  with periods 2-3 hours seated; reports "I can walk 100 steps after driving and my leg is fine until after a little bit"   Subjective   Subjective "I have never used a walker before, but this is helping"   ADL   Where Assessed Other (Comment)  (bathroom)   Grooming Assistance 5 Supervision/Setup   Grooming Deficit Standing with assistive device; Increased time to complete;Wash/dry hands   LB Dressing Assistance 5  Supervision/Setup   LB Dressing Deficit Increased time to complete   Toileting Assistance  5  Supervision/Setup   Toileting Deficit Increased time to complete   Additional Comments pt utilizes RW in bathroom with no LOB or instability; pt dons shoes seated in chair by bending forward   Bed Mobility   Additional Comments pt OOB at start and end of session; SPO2 WFL with no complaints of SOB   Transfers   Sit to Stand 5  Supervision   Additional items Increased time required;Verbal cues  (RW)   Stand to Sit 5  Supervision   Additional items Increased time required;Verbal cues  (RW)   Toilet transfer 5  Supervision   Additional items Increased time required;Standard toilet;Verbal cues  (RW)   Additional Comments pt performed with RW during session; initially no device, and with RW for remainder of session with significantly increased stability   Functional Mobility   Functional Mobility 5  Supervision   Additional Comments pt performs functional mobility to and from bathroom during session ~50ft; limited due to increased pain to L LE after minimal mobility; radiates to toes of L LE   Additional items Rolling walker   Balance   Static Sitting Good   Dynamic Sitting Good   Static Standing Fair   Dynamic Standing Fair -   Ambulatory Fair -   Activity Tolerance   Activity Tolerance Patient limited by fatigue;Patient limited by pain   RUE Assessment   RUE Assessment WFL   LUE Assessment   LUE Assessment WFL   Hand Function   Gross Motor Coordination Functional   Fine Motor Coordination Functional   Sensation   Light Touch No apparent deficits   Sharp/Dull No apparent deficits   Psychosocial   Psychosocial (WDL) WDL   Cognition   Overall Cognitive Status WFL   Arousal/Participation Alert   Attention Within functional limits   Orientation Level Oriented X4   Memory Within functional limits Following Commands Follows all commands and directions without difficulty   Assessment   Limitation Decreased ADL status; Decreased UE strength;Decreased Safe judgement during ADL;Decreased endurance;Decreased self-care trans;Decreased high-level ADLs   Assessment Pt is a 58 y o  male seen for OT evaluation s/p admit to [facility] on 10/24/2022 w/ Lumbar back pain with radiculopathy affecting left lower extremity  Comorbidities affecting pt's functional performance at time of assessment include: gout, back injury, basal cell carcinoma, gout, elevated TSH, lumbar back pain wiht radiculopathy affecting L LE  Personal factors affecting pt at time of IE include:steps to enter environment, difficulty performing ADLS and difficulty performing IADLS   Prior to admission, pt was (I) with ADLs and IADLs with use of no device during mobility  Upon evaluation: Pt requires (S) level with use of RW during mobility 2* the following deficits impacting occupational performance: weakness, decreased strength, decreased balance, decreased tolerance, impaired initiation, decreased safety awareness and impaired interpersonal skills  Pt to benefit from continued skilled OT tx while in the hospital to address deficits as defined above and maximize level of functional independence w ADL's and functional mobility  Occupational Performance areas to address include: grooming, bathing/shower, toilet hygiene, dressing, functional mobility, community mobility and clothing management  The patient's raw score on the AM-PAC Daily Activity inpatient short form is 21, standardized score is 44 27, greater than 39 4  Patients at this level are likely to benefit from discharge to home  Please refer to the recommendation of the Occupational Therapist for safe discharge planning    Pt benefited from co-evaluation of skilled OT and PT therapists in order to most appropriately address functional deficits d/t extensive assistance required for safe functional mobility, decreased activity tolerance, and regression from functioning level prior to admission and/or onset of present illness  OT/PT objectives were addressed separately; please see PT note for specific goal areas targeted  Goals   Patient Goals to go home   Short Term Goal  pt will perform UE strengthening exercises   Long Term Goal #1 pt will demonstrate toilet transfers and hygiene at (I) level   Long Term Goal #2 pt will demonstrate functional mobility with RW at mod (I) level   Long Term Goal pt will demonstrate UB/LB bathing and grooming tasks at (I) level   Plan   Treatment Interventions ADL retraining;Functional transfer training;UE strengthening/ROM; Endurance training;Patient/family training; Activityengagement;Equipment evaluation/education   Goal Expiration Date 11/08/22   OT Frequency 3-5x/wk   Recommendation   OT Discharge Recommendation Home with outpatient rehabilitation   AM-PAC Daily Activity Inpatient   Lower Body Dressing 3   Bathing 3   Toileting 3   Upper Body Dressing 4   Grooming 4   Eating 4   Daily Activity Raw Score 21   Daily Activity Standardized Score (Calc for Raw Score >=11) 44 27   AM-PAC Applied Cognition Inpatient   Following a Speech/Presentation 4   Understanding Ordinary Conversation 4   Taking Medications 4   Remembering Where Things Are Placed or Put Away 4   Remembering List of 4-5 Errands 4   Taking Care of Complicated Tasks 4   Applied Cognition Raw Score 24   Applied Cognition Standardized Score 62 21

## 2022-10-25 NOTE — PHYSICAL THERAPY NOTE
Physical Therapy Evaluation    Patient Name: Tyrone Duran    [id number]'K Date: 10/25/2022     Problem List  Principal Problem:    Lumbar back pain with radiculopathy affecting left lower extremity  Active Problems:    Gout    Elevated TSH       Past Medical History  Past Medical History:   Diagnosis Date    Back injury     Basal cell carcinoma     Gout         Past Surgical History  Past Surgical History:   Procedure Laterality Date    ROTATOR CUFF REPAIR Right     with bicep repair    SKIN CANCER EXCISION      BCCA chest and back           10/25/22 3183   PT Last Visit   PT Visit Date 10/25/22   Note Type   Note type Evaluation   Pain Assessment   Pain Assessment Tool 0-10   Pain Score 6   Pain Location/Orientation Orientation: Lower; Location: Back;Orientation: Left; Location: Leg   Restrictions/Precautions   Weight Bearing Precautions Per Order No   Other Precautions Pain; Fall Risk;Multiple lines   Home Living   Type of [address] [place] [address] Multi-level;Performs ADLs on one level; Able to live on main level with bedroom/bathroom  (0 ANU)   Bathroom Shower/Tub Walk-in shower   Bathroom Toilet Raised   Bathroom Equipment Grab bars in shower;Built-in shower seat   Bathroom Accessibility Accessible   Additional Comments pt denies use of DME at baseline   Prior Function   Level of [place] Independent with ADLs; Independent with functional mobility; Independent with IADLS   Lives With Spouse   Receives Help From Family   IADLs Independent with driving   Falls in the last 6 months 0   Vocational Full time employment   Comments pt is a ; last day worked was saturday 10/22   General   Family/Caregiver Present No   Cognition   Overall Cognitive Status WFL   Arousal/Participation Alert   Orientation Level Oriented X4   Following Commands Follows all commands and directions without difficulty   Comments pt pleasant and cooperative   Subjective   Subjective "I drive 3-4 hours and then walk around"   RLE Assessment   RLE Assessment WFL   LLE Assessment   LLE Assessment WFL   Bed Mobility   Additional Comments pt OOB at start/end of session   Transfers   Sit to Stand 5  Supervision   Additional items Increased time required;Armrests; Verbal cues   Stand to Sit 5  Supervision   Additional items Armrests; Increased time required;Verbal cues   Toilet transfer 5  Supervision   Additional items Increased time required;Verbal cues;Standard toilet   Additional Comments performed with and without RW   Ambulation/Elevation   Gait pattern Excessively slow; Short stride;Decreased L stance; Antalgic   Gait Assistance 5  Supervision   Additional items Verbal cues   Assistive Device Rolling walker   Distance 25'   Balance   Static Sitting Good   Dynamic Sitting Good   Static Standing Fair   Dynamic Standing Yovani Michael 6896 -  (with RW)   Endurance Deficit   Endurance Deficit Yes   Endurance Deficit Description seated rest break required following 25' of ambulation   Activity Tolerance   Activity Tolerance Patient limited by fatigue;Patient limited by pain   Assessment   Prognosis Good   Problem List Decreased strength;Decreased endurance; Impaired balance;Decreased mobility;Pain;Obesity   Assessment Patient is a 58 y o  male evaluated by Physical Therapy s/p admit to [address] [place] [address],[address] on 10/24/2022 with admitting diagnosis of: Sciatica, Ambulatory dysfunction, and principal problem of: Lumbar back pain with radiculopathy affecting left lower extremity  PT was consulted to assess patient's functional mobility and discharge needs  Ordered are PT Evaluation and treatment with activity level of: up with assistance  Comorbidities affecting patient's physical performance at time of assessment include: hx of gout, back injury, basal cell carcinoma   Personal factors affecting the patient at time of IE include: lives in 2 story home, ambulating with assistive device, inability to navigate community distances, inability/difficulty performing IADLs and inability/difficulty performing ADLs  Please locate objective findings from PT assessment regarding body systems outlined above  Upon evaluation, pt able to perform all functional mobility with SUP, RW, and increased time  Occasional verbal cuing provided for safety awareness and sequencing  Pt attempting mobility initially without device, however was unable to safely bear weight through L LE  When provided with RW, pt able to disperse weight through B UEs and successfully ambulate to/from bathroom  Seated rest break taken following d/t persistent pain but no true LOB experienced  HR and SpO2 remained WFL on RA throughout  Pt appears very motivated to participate in PT intervention and is agreeable to discharging home with RW  The patient's AM-PAC Basic Mobility Inpatient Short Form Raw Score is 18  A Raw score of greater than 16 suggests the patient may benefit from discharge to home  Please also refer to the recommendation of the Physical Therapist for safe discharge planning  Co treatment with OT secondary to complex medical condition of pt, possible A of 2 required to achieve and maintain transitional movements, requiring the need of skilled therapeutic intervention of 2 therapists to achieve delivery of services  Pt will benefit from continued PT intervention during LOS to address current deficits, increase LOF, and facilitate safe d/c to next level of care when medically appropriate  D/c recommendation at this time is home with outpatient rehabilitation  Goals   Patient Goals to feel better   LTG Expiration Date 11/08/22   Long Term Goal #1 Pt will participate in B LE strengthening exercises to facilitate improved functional activity tolerance  Pt will perform all functional transfers and bed mobility mod(I) with good safety awareness  Pt will ambulate 250' mod(I) with LRAD while maintaining good functional dynamic balance     Plan Treatment/Interventions Functional transfer training;LE strengthening/ROM; Therapeutic exercise; Endurance training;Bed mobility;Gait training   PT Frequency 3-5x/wk   Recommendation   PT Discharge Recommendation Home with outpatient rehabilitation   AM-PAC Basic Mobility Inpatient   Turning in Bed Without Bedrails 3   Lying on Back to Sitting on Edge of Flat Bed 3   Moving Bed to Chair 3   Standing Up From Chair 3   Walk in Room 3   Climb 3-5 Stairs 3   Basic Mobility Inpatient Raw Score 18   Basic Mobility Standardized Score 41 05   Highest Level Of Mobility   JH-HLM Goal 6: Walk 10 steps or more   JH-HLM Achieved 7: Walk 25 feet or more   End of Consult   Patient Position at End of Consult Bedside chair; All needs within reach

## 2022-10-30 ENCOUNTER — HOSPITAL ENCOUNTER (OUTPATIENT)
Dept: MRI IMAGING | Facility: HOSPITAL | Age: 62
Discharge: HOME | End: 2022-10-30
Attending: INTERNAL MEDICINE

## 2022-10-30 DIAGNOSIS — M54.16 LUMBAR BACK PAIN WITH RADICULOPATHY AFFECTING LEFT LOWER EXTREMITY: ICD-10-CM

## 2022-10-30 DIAGNOSIS — M54.30 SCIATICA: ICD-10-CM

## 2022-10-30 DIAGNOSIS — M51.37 DEGENERATIVE DISC DISEASE AT L5-S1 LEVEL: ICD-10-CM

## 2022-10-31 ENCOUNTER — EVALUATION (OUTPATIENT)
Dept: PHYSICAL THERAPY | Facility: CLINIC | Age: 62
End: 2022-10-31

## 2022-10-31 DIAGNOSIS — R26.2 AMBULATORY DYSFUNCTION: ICD-10-CM

## 2022-10-31 DIAGNOSIS — M54.42 ACUTE LEFT-SIDED LOW BACK PAIN WITH LEFT-SIDED SCIATICA: ICD-10-CM

## 2022-10-31 DIAGNOSIS — M54.30 SCIATICA, UNSPECIFIED LATERALITY: Primary | ICD-10-CM

## 2022-11-10 ENCOUNTER — OFFICE VISIT (OUTPATIENT)
Dept: URGENT CARE | Facility: CLINIC | Age: 62
End: 2022-11-10

## 2022-11-10 ENCOUNTER — HOSPITAL ENCOUNTER (EMERGENCY)
Facility: HOSPITAL | Age: 62
Discharge: HOME | End: 2022-11-10
Attending: INTERNAL MEDICINE

## 2022-11-10 ENCOUNTER — APPOINTMENT (OUTPATIENT)
Dept: NON INVASIVE DIAGNOSTICS | Facility: HOSPITAL | Age: 62
End: 2022-11-10

## 2022-11-10 VITALS
TEMPERATURE: 97.8 F | RESPIRATION RATE: 12 BRPM | SYSTOLIC BLOOD PRESSURE: 145 MMHG | OXYGEN SATURATION: 95 % | DIASTOLIC BLOOD PRESSURE: 77 MMHG | HEART RATE: 79 BPM

## 2022-11-10 VITALS
BODY MASS INDEX: 41.3 KG/M2 | HEART RATE: 95 BPM | TEMPERATURE: 97.7 F | WEIGHT: 313 LBS | SYSTOLIC BLOOD PRESSURE: 172 MMHG | DIASTOLIC BLOOD PRESSURE: 100 MMHG | OXYGEN SATURATION: 99 % | RESPIRATION RATE: 20 BRPM

## 2022-11-10 DIAGNOSIS — M79.605 PAIN AND SWELLING OF LOWER EXTREMITY, LEFT: Primary | ICD-10-CM

## 2022-11-10 DIAGNOSIS — G89.29 CHRONIC LEFT-SIDED LOW BACK PAIN WITHOUT SCIATICA: ICD-10-CM

## 2022-11-10 DIAGNOSIS — M79.89 PAIN AND SWELLING OF LOWER EXTREMITY, LEFT: Primary | ICD-10-CM

## 2022-11-10 DIAGNOSIS — M79.605 LEFT LEG PAIN: Primary | ICD-10-CM

## 2022-11-10 DIAGNOSIS — M54.50 CHRONIC LEFT-SIDED LOW BACK PAIN WITHOUT SCIATICA: ICD-10-CM

## 2022-11-10 LAB
ALBUMIN SERPL BCP-MCNC: 4.2 G/DL (ref 3.5–5)
ALP SERPL-CCNC: 80 U/L (ref 46–116)
ALT SERPL W P-5'-P-CCNC: 44 U/L (ref 12–78)
ANION GAP SERPL CALCULATED.3IONS-SCNC: 10 MMOL/L (ref 4–13)
BASOPHILS # BLD AUTO: 0.04 THOUSANDS/ÂΜL (ref 0–0.1)
BASOPHILS NFR BLD AUTO: 0 % (ref 0–1)
BILIRUB SERPL-MCNC: 0.59 MG/DL (ref 0.2–1)
BUN SERPL-MCNC: 18 MG/DL (ref 5–25)
CALCIUM SERPL-MCNC: 9.3 MG/DL (ref 8.3–10.1)
CHLORIDE SERPL-SCNC: 102 MMOL/L (ref 96–108)
CO2 SERPL-SCNC: 28 MMOL/L (ref 21–32)
CREAT SERPL-MCNC: 1 MG/DL (ref 0.6–1.3)
DME PARACHUTE DELIVERY DATE ACTUAL: NORMAL
DME PARACHUTE DELIVERY DATE REQUESTED: NORMAL
DME PARACHUTE ITEM DESCRIPTION: NORMAL
DME PARACHUTE ORDER STATUS: NORMAL
DME PARACHUTE SUPPLIER NAME: NORMAL
DME PARACHUTE SUPPLIER PHONE: NORMAL
EOSINOPHIL # BLD AUTO: 0.19 THOUSAND/ÂΜL (ref 0–0.61)
EOSINOPHIL NFR BLD AUTO: 2 % (ref 0–6)
ERYTHROCYTE [DISTWIDTH] IN BLOOD BY AUTOMATED COUNT: 12.7 % (ref 11.6–15.1)
GFR SERPL CREATININE-BSD FRML MDRD: 80 ML/MIN/1.73SQ M
GLUCOSE SERPL-MCNC: 104 MG/DL (ref 65–140)
HCT VFR BLD AUTO: 49.2 % (ref 36.5–49.3)
HGB BLD-MCNC: 16.8 G/DL (ref 12–17)
IMM GRANULOCYTES # BLD AUTO: 0.02 THOUSAND/UL (ref 0–0.2)
IMM GRANULOCYTES NFR BLD AUTO: 0 % (ref 0–2)
LYMPHOCYTES # BLD AUTO: 1.93 THOUSANDS/ÂΜL (ref 0.6–4.47)
LYMPHOCYTES NFR BLD AUTO: 21 % (ref 14–44)
MCH RBC QN AUTO: 29.8 PG (ref 26.8–34.3)
MCHC RBC AUTO-ENTMCNC: 34.1 G/DL (ref 31.4–37.4)
MCV RBC AUTO: 87 FL (ref 82–98)
MONOCYTES # BLD AUTO: 0.87 THOUSAND/ÂΜL (ref 0.17–1.22)
MONOCYTES NFR BLD AUTO: 9 % (ref 4–12)
NEUTROPHILS # BLD AUTO: 6.23 THOUSANDS/ÂΜL (ref 1.85–7.62)
NEUTS SEG NFR BLD AUTO: 68 % (ref 43–75)
NRBC BLD AUTO-RTO: 0 /100 WBCS
PLATELET # BLD AUTO: 275 THOUSANDS/UL (ref 149–390)
PMV BLD AUTO: 9.7 FL (ref 8.9–12.7)
POTASSIUM SERPL-SCNC: 4.1 MMOL/L (ref 3.5–5.3)
PROT SERPL-MCNC: 8.1 G/DL (ref 6.4–8.4)
RBC # BLD AUTO: 5.63 MILLION/UL (ref 3.88–5.62)
SODIUM SERPL-SCNC: 140 MMOL/L (ref 135–147)
URATE SERPL-MCNC: 4.3 MG/DL (ref 3.5–8.5)
WBC # BLD AUTO: 9.28 THOUSAND/UL (ref 4.31–10.16)

## 2022-11-10 RX ORDER — INDOMETHACIN 50 MG/1
CAPSULE ORAL
COMMUNITY
Start: 2022-10-27

## 2022-11-10 RX ORDER — TRAMADOL HYDROCHLORIDE 50 MG/1
50 TABLET ORAL EVERY 6 HOURS PRN
COMMUNITY
Start: 2022-10-14

## 2022-11-10 RX ORDER — KETOROLAC TROMETHAMINE 30 MG/ML
30 INJECTION, SOLUTION INTRAMUSCULAR; INTRAVENOUS ONCE
Status: COMPLETED | OUTPATIENT
Start: 2022-11-10 | End: 2022-11-10

## 2022-11-10 RX ORDER — COLCHICINE 0.6 MG/1
0.6 TABLET ORAL DAILY
COMMUNITY
Start: 2022-10-27

## 2022-11-10 RX ORDER — OXYCODONE AND ACETAMINOPHEN 7.5; 325 MG/1; MG/1
1 TABLET ORAL EVERY 6 HOURS PRN
COMMUNITY
Start: 2022-11-01

## 2022-11-10 RX ORDER — OXYCODONE HYDROCHLORIDE AND ACETAMINOPHEN 5; 325 MG/1; MG/1
1 TABLET ORAL ONCE
Status: COMPLETED | OUTPATIENT
Start: 2022-11-10 | End: 2022-11-10

## 2022-11-10 RX ADMIN — KETOROLAC TROMETHAMINE 30 MG: 30 INJECTION, SOLUTION INTRAMUSCULAR at 13:42

## 2022-11-10 RX ADMIN — OXYCODONE HYDROCHLORIDE AND ACETAMINOPHEN 1 TABLET: 5; 325 TABLET ORAL at 15:28

## 2022-11-10 NOTE — DISCHARGE INSTRUCTIONS
Take medications as prescribed  Follow up with your orthopedist   Labs Reviewed   CBC AND DIFFERENTIAL - Abnormal       Result Value Ref Range Status    WBC 9 28  4 31 - 10 16 Thousand/uL Final    RBC 5 63 (*) 3 88 - 5 62 Million/uL Final    Hemoglobin 16 8  12 0 - 17 0 g/dL Final    Hematocrit 49 2  36 5 - 49 3 % Final    MCV 87  82 - 98 fL Final    MCH 29 8  26 8 - 34 3 pg Final    MCHC 34 1  31 4 - 37 4 g/dL Final    RDW 12 7  11 6 - 15 1 % Final    MPV 9 7  8 9 - 12 7 fL Final    Platelets 552  820 - 390 Thousands/uL Final    nRBC 0  /100 WBCs Final    Neutrophils Relative 68  43 - 75 % Final    Immat GRANS % 0  0 - 2 % Final    Lymphocytes Relative 21  14 - 44 % Final    Monocytes Relative 9  4 - 12 % Final    Eosinophils Relative 2  0 - 6 % Final    Basophils Relative 0  0 - 1 % Final    Neutrophils Absolute 6 23  1 85 - 7 62 Thousands/µL Final    Immature Grans Absolute 0 02  0 00 - 0 20 Thousand/uL Final    Lymphocytes Absolute 1 93  0 60 - 4 47 Thousands/µL Final    Monocytes Absolute 0 87  0 17 - 1 22 Thousand/µL Final    Eosinophils Absolute 0 19  0 00 - 0 61 Thousand/µL Final    Basophils Absolute 0 04  0 00 - 0 10 Thousands/µL Final   URIC ACID - Normal    Uric Acid 4 3  3 5 - 8 5 mg/dL Final    Comment: Specimen collection should occur prior to Metamizole administration due to the potential for falsely depressed results  COMPREHENSIVE METABOLIC PANEL    Sodium 816  135 - 147 mmol/L Final    Potassium 4 1  3 5 - 5 3 mmol/L Final    Chloride 102  96 - 108 mmol/L Final    CO2 28  21 - 32 mmol/L Final    ANION GAP 10  4 - 13 mmol/L Final    BUN 18  5 - 25 mg/dL Final    Creatinine 1 00  0 60 - 1 30 mg/dL Final    Comment: Standardized to IDMS reference method    Glucose 104  65 - 140 mg/dL Final    Comment: If the patient is fasting, the ADA then defines impaired fasting glucose as > 100 mg/dL and diabetes as > or equal to 123 mg/dL    Specimen collection should occur prior to Sulfasalazine administration due to the potential for falsely depressed results  Specimen collection should occur prior to Sulfapyridine administration due to the potential for falsely elevated results  Calcium 9 3  8 3 - 10 1 mg/dL Final    AST     Final    Comment: No Results  Specimen collection should occur prior to Sulfasalazine administration due to the potential for falsely depressed results  ALT 44  12 - 78 U/L Final    Comment: Specimen collection should occur prior to Sulfasalazine administration due to the potential for falsely depressed results  Alkaline Phosphatase 80  46 - 116 U/L Final    Total Protein 8 1  6 4 - 8 4 g/dL Final    Albumin 4 2  3 5 - 5 0 g/dL Final    Total Bilirubin 0 59  0 20 - 1 00 mg/dL Final    Comment: Use of this assay is not recommended for patients undergoing treatment with eltrombopag due to the potential for falsely elevated results      eGFR 80  ml/min/1 73sq m Final    Narrative:     [place] guidelines for Chronic Kidney Disease (CKD):     Stage 1 with normal or high GFR (GFR > 90 mL/min/1 73 square meters)    Stage 2 Mild CKD (GFR = 60-89 mL/min/1 73 square meters)    Stage 3A Moderate CKD (GFR = 45-59 mL/min/1 73 square meters)    Stage 3B Moderate CKD (GFR = 30-44 mL/min/1 73 square meters)    Stage 4 Severe CKD (GFR = 15-29 mL/min/1 73 square meters)    Stage 5 End Stage CKD (GFR <15 mL/min/1 73 square meters)  Note: GFR calculation is accurate only with a steady state creatinine I will STOP taking the medications listed below when I get home from the hospital:  None

## 2022-11-10 NOTE — ED PROVIDER NOTES
History  Chief Complaint   Patient presents with   • Leg Pain     Patient states for a few days hes been having left leg pain  Patient went to urgent care and was sent to the ED for an ultrasound     THIS IS A 58An Akbar Schfilipet 54 FROM clinic for having left big toe, ankle, left calf pain  This symptoms has started about few days ago  So told the patient that he need to go to the ER by ambulance patient declined and he came with his own car  Patient had history of low back pain and he was admitted to the hospital about 10 days ago for low back pain and at that time patient has MRI of the lumbar spine which shows; 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 where severe facet degenerative change accounts for slight anterolisthesis and uncovering the posterior disc margin with left paramedian/foraminal protrusion resulting in moderate to severe left   foraminal narrowing  Correlate for left L4 radiculopathy  Patient at that time he received methylprednisolone 40 mg IV and he was put on Dilaudid for the pain  Patient currently he take oxycodone 7 5 mg p r n  And he is tolerating it in addition he take tramadol  Patient has extensive history of gout and he is on colchicine, Uloric, indomethacin with the flare up  Patient also having CT abdomen pelvis which was done on 10/24 which came back normal   Patient is morbidly obese 313 lb and walking is difficult for him  Patient use walker  Patient denies any fever, fall, trauma to lower extremities  Patient denies any urinary or rectal symptoms  Patient has good control of urine and stool     Patient denies any CP, SOB, abdominal pain, nausea vomiting diarrhea        History provided by:  Patient   used: No    Leg Pain  Location:  Leg, ankle and toe  Injury: no    Leg location:  L leg and L lower leg  Ankle location:  L ankle  Toe location:  L great toe  Pain details:     Quality:  Aching    Radiates to:  Does not radiate    Severity: Moderate    Onset quality:  Unable to specify    Timing:  Constant    Progression:  Unchanged  Chronicity:  Recurrent  Dislocation: no    Foreign body present:  No foreign bodies  Prior injury to area:  No  Relieved by:  Arthritis medications  Worsened by:  Nothing  Ineffective treatments:  Arthritis medication  Associated symptoms: back pain    Associated symptoms: no fatigue, no fever and no neck pain        Prior to Admission Medications   Prescriptions Last Dose Informant Patient Reported? Taking?    HYDROmorphone (DILAUDID) 2 mg tablet   No No   Sig: Take 1 tablet (2 mg total) by mouth every 4 (four) hours as needed for moderate pain Max Daily Amount: 12 mg   Patient not taking: Reported on 11/10/2022   ULORIC 40 MG tablet  Self Yes No   acetaminophen (TYLENOL) 325 mg tablet   No No   Sig: Take 2 tablets (650 mg total) by mouth every 6 (six) hours as needed for mild pain   colchicine (COLCRYS) 0 6 mg tablet   Yes No   Sig: Take 0 6 mg by mouth daily   gabapentin (NEURONTIN) 300 mg capsule  Self No No   Sig: Take 1 capsule (300 mg total) by mouth daily at bedtime   Patient not taking: Reported on 11/10/2022   indomethacin (INDOCIN) 50 mg capsule   Yes No   oxyCODONE-acetaminophen (PERCOCET) 7 5-325 MG per tablet   Yes No   Sig: Take 1 tablet by mouth every 6 (six) hours as needed   traMADol (ULTRAM) 50 mg tablet   Yes No   Sig: Take 50 mg by mouth every 6 (six) hours as needed      Facility-Administered Medications: None       Past Medical History:   Diagnosis Date   • Back injury    • Basal cell carcinoma    • Gout        Past Surgical History:   Procedure Laterality Date   • ROTATOR CUFF REPAIR Right     with bicep repair   • SKIN CANCER EXCISION      BCCA chest and back       Family History   Problem Relation Age of Onset   • Colon cancer Mother    • Lung cancer Father    • No Known Problems Sister    • Cancer Maternal Grandmother    • Cancer Maternal Grandfather    • Bone cancer Paternal Grandmother    • No Known Problems Paternal Grandfather    • Bone cancer Maternal Aunt    • Cancer Maternal Aunt    • No Known Problems Maternal Aunt    • No Known Problems Maternal Aunt    • Throat cancer Cousin      I have reviewed and agree with the history as documented  E-Cigarette/Vaping   • E-Cigarette Use Never User      E-Cigarette/Vaping Substances   • Nicotine No    • THC No    • CBD No    • Flavoring No    • Other No    • Unknown No      Social History     Tobacco Use   • Smoking status: Never Smoker   • Smokeless tobacco: Never Used   Vaping Use   • Vaping Use: Never used   Substance Use Topics   • Alcohol use: Never   • Drug use: No       Review of Systems   Constitutional: Negative for diaphoresis, fatigue and fever  HENT: Negative for sinus pressure, sinus pain, sneezing, sore throat, tinnitus and trouble swallowing  Respiratory: Negative for cough, chest tightness, shortness of breath and wheezing  Cardiovascular: Negative for chest pain, palpitations and leg swelling  Gastrointestinal: Negative for abdominal pain, diarrhea, nausea and vomiting  Genitourinary: Negative for difficulty urinating, dysuria, flank pain and hematuria  Musculoskeletal: Positive for back pain  Negative for arthralgias, gait problem, neck pain and neck stiffness  Skin: Negative for color change, pallor and rash  Neurological: Negative for dizziness, seizures, syncope, weakness, light-headedness and headaches  Hematological: Negative for adenopathy  Does not bruise/bleed easily  Psychiatric/Behavioral: Negative for agitation and behavioral problems  Physical Exam  Physical Exam  Vitals and nursing note reviewed  Constitutional:       General: He is not in acute distress  Appearance: He is well-developed  He is obese  He is not ill-appearing, toxic-appearing or diaphoretic  HENT:      Head: Normocephalic and atraumatic        Right Ear: Ear canal normal       Left Ear: Ear canal normal       Nose: Nose normal       Mouth/Throat:      Mouth: Mucous membranes are moist       Pharynx: No oropharyngeal exudate or posterior oropharyngeal erythema  Eyes:      Extraocular Movements: Extraocular movements intact  Pupils: Pupils are equal, round, and reactive to light  Neck:      Vascular: No carotid bruit  Cardiovascular:      Rate and Rhythm: Normal rate and regular rhythm  Heart sounds: Normal heart sounds  No murmur heard  No friction rub  No gallop  Pulmonary:      Effort: Pulmonary effort is normal  No respiratory distress  Breath sounds: Normal breath sounds  No wheezing, rhonchi or rales  Chest:      Chest wall: No tenderness  Abdominal:      General: Bowel sounds are normal  There is no distension  Palpations: Abdomen is soft  There is no mass  Tenderness: There is no abdominal tenderness  There is no right CVA tenderness, left CVA tenderness or guarding  Hernia: No hernia is present  Musculoskeletal:         General: No swelling, tenderness, deformity or signs of injury  Normal range of motion  Cervical back: Normal range of motion and neck supple  No rigidity or tenderness  Right lower leg: No edema  Left lower leg: No edema  Comments: Examination of the LLE shows no tenderness, swelling, erythema, no warmth  Has mild distal leg pain but it is not tender  The left big toe is not swollen  Mildly tender  No redness  LLE sensation is intact neurovascular is intact  Patient was diagnosed with radiculopathy  Lymphadenopathy:      Cervical: No cervical adenopathy  Skin:     General: Skin is warm and dry  Capillary Refill: Capillary refill takes less than 2 seconds  Neurological:      Mental Status: He is alert and oriented to person, place, and time     Psychiatric:         Behavior: Behavior normal          Vital Signs  ED Triage Vitals   Temperature Pulse Respirations Blood Pressure SpO2   11/10/22 1316 11/10/22 1316 11/10/22 1316 11/10/22 1318 11/10/22 1316   97 8 °F (36 6 °C) 103 17 146/96 97 %      Temp Source Heart Rate Source Patient Position - Orthostatic VS BP Location FiO2 (%)   11/10/22 1316 11/10/22 1316 11/10/22 1430 11/10/22 1430 --   Temporal Monitor Lying Right arm       Pain Score       11/10/22 1316       5           Vitals:    11/10/22 1430 11/10/22 1530 11/10/22 1630 11/10/22 1700   BP: 125/82 142/74 164/88 145/77   Pulse: 88 84 80 79   Patient Position - Orthostatic VS: Lying            Visual Acuity      ED Medications  Medications   ketorolac (TORADOL) injection 30 mg (30 mg Intramuscular Given 11/10/22 1342)   oxyCODONE-acetaminophen (PERCOCET) 5-325 mg per tablet 1 tablet (1 tablet Oral Given 11/10/22 1528)       Diagnostic Studies  Results Reviewed     Procedure Component Value Units Date/Time    Comprehensive metabolic panel [[id number]] Collected: 11/10/22 1336    Lab Status: Final result Specimen: Blood from Arm, Right Updated: 11/10/22 1417     Sodium 140 mmol/L      Potassium 4 1 mmol/L      Chloride 102 mmol/L      CO2 28 mmol/L      ANION GAP 10 mmol/L      BUN 18 mg/dL      Creatinine 1 00 mg/dL      Glucose 104 mg/dL      Calcium 9 3 mg/dL      AST --     ALT 44 U/L      Alkaline Phosphatase 80 U/L      Total Protein 8 1 g/dL      Albumin 4 2 g/dL      Total Bilirubin 0 59 mg/dL      eGFR 80 ml/min/1 73sq m     Narrative:      Linda guidelines for Chronic Kidney Disease (CKD):   •  Stage 1 with normal or high GFR (GFR > 90 mL/min/1 73 square meters)  •  Stage 2 Mild CKD (GFR = 60-89 mL/min/1 73 square meters)  •  Stage 3A Moderate CKD (GFR = 45-59 mL/min/1 73 square meters)  •  Stage 3B Moderate CKD (GFR = 30-44 mL/min/1 73 square meters)  •  Stage 4 Severe CKD (GFR = 15-29 mL/min/1 73 square meters)  •  Stage 5 End Stage CKD (GFR <15 mL/min/1 73 square meters)  Note: GFR calculation is accurate only with a steady state creatinine    Uric acid [[id number]]  (Normal) Collected: 11/10/22 1336    Lab Status: Final result Specimen: Blood from Arm, Right Updated: 11/10/22 1416     Uric Acid 4 3 mg/dL     CBC and differential [[id number]]  (Abnormal) Collected: 11/10/22 1336    Lab Status: Final result Specimen: Blood from Arm, Right Updated: 11/10/22 1353     WBC 9 28 Thousand/uL      RBC 5 63 Million/uL      Hemoglobin 16 8 g/dL      Hematocrit 49 2 %      MCV 87 fL      MCH 29 8 pg      MCHC 34 1 g/dL      RDW 12 7 %      MPV 9 7 fL      Platelets 448 Thousands/uL      nRBC 0 /100 WBCs      Neutrophils Relative 68 %      Immat GRANS % 0 %      Lymphocytes Relative 21 %      Monocytes Relative 9 %      Eosinophils Relative 2 %      Basophils Relative 0 %      Neutrophils Absolute 6 23 Thousands/µL      Immature Grans Absolute 0 02 Thousand/uL      Lymphocytes Absolute 1 93 Thousands/µL      Monocytes Absolute 0 87 Thousand/µL      Eosinophils Absolute 0 19 Thousand/µL      Basophils Absolute 0 04 Thousands/µL                  VAS lower limb venous duplex study, unilateral/limited   Final Result by Lupe Johnson MD (11/10 1749)                 Procedures  ECG 12 Lead Documentation Only    Date/Time: 11/10/2022 1:48 PM  Performed by: Peter Page MD  Authorized by: Peter Page MD     Indications / Diagnosis:  Weakness  ECG reviewed by me, the ED Provider: yes    Patient location:  ED and bedside  Interpretation:     Interpretation: abnormal    Rate:     ECG rate:  91    ECG rate assessment: normal    Rhythm:     Rhythm: sinus rhythm    Ectopy:     Ectopy: none    QRS:     QRS axis:  Right    QRS intervals:  Normal  Conduction:     Conduction: normal    ST segments:     ST segments:  Normal  T waves:     T waves: normal               ED Course  ED Course as of 11/10/22 2158   Thu Nov 10, 2022   1433 Venous duplex LLE ; NEG for DVT                                               MDM  Number of Diagnoses or Management Options  Diagnosis management comments: A this is a 58years old came for having me pain of the left big toe ankle leg  Patient was sent from the clinic for possible DVT  Venous Doppler study was done and came back negative for DVT  Labs are reviewed came back normal   Physical exam shows pain on his lower back patient has a recent MRI of the lumbar spine  Patient also having CT of abdomen pelvis on 10/24 came back negative  Patient given Toradol and give oxycodone start to feel better  Patient has appointment with his orthopedist on 11/18 asked his wife to try to advance the appointment  All question concerns of the patient have been fully addressed  Amount and/or Complexity of Data Reviewed  Clinical lab tests: ordered and reviewed  Tests in the radiology section of CPT®: ordered and reviewed  Decide to obtain previous medical records or to obtain history from someone other than the patient: yes  Review and summarize past medical records: yes  Independent visualization of images, tracings, or specimens: yes    Risk of Complications, Morbidity, and/or Mortality  Presenting problems: moderate  Diagnostic procedures: moderate  Management options: moderate        Disposition  Final diagnoses:   Left leg pain   Chronic left-sided low back pain without sciatica     Time reflects when diagnosis was documented in both MDM as applicable and the Disposition within this note     Time User Action Codes Description Comment    11/10/2022  4:53 PM Kimberly Amin Add [M79 605] Left leg pain     11/10/2022  4:54 PM Hadley Zuñiga Add [M54 50,  G89 29] Chronic left-sided low back pain without sciatica       ED Disposition     ED Disposition   Discharge    Condition   Stable    Date/Time   Thu Nov 10, 2022  4:53 PM    [address] discharge to home/self care                 Follow-up Information     Follow up With Specialties Details Why Contact Info    Meeta Chen MD Family Medicine In 1 week  [address] [facility] [place]  [phone]        In 3 days  follow up with your orthopedist          Discharge Medication List as of 11/10/2022  4:55 PM      CONTINUE these medications which have NOT CHANGED    Details   acetaminophen (TYLENOL) 325 mg tablet Take 2 tablets (650 mg total) by mouth every 6 (six) hours as needed for mild pain, Starting Tue 10/25/2022, Normal      colchicine (COLCRYS) 0 6 mg tablet Take 0 6 mg by mouth daily, Starting Thu 10/27/2022, Historical Med      gabapentin (NEURONTIN) 300 mg capsule Take 1 capsule (300 mg total) by mouth daily at bedtime, Starting Mon 5/13/2019, Print      HYDROmorphone (DILAUDID) 2 mg tablet Take 1 tablet (2 mg total) by mouth every 4 (four) hours as needed for moderate pain Max Daily Amount: 12 mg, Starting Tue 10/25/2022, Normal      indomethacin (INDOCIN) 50 mg capsule Starting Thu 10/27/2022, Historical Med      oxyCODONE-acetaminophen (PERCOCET) 7 5-325 MG per tablet Take 1 tablet by mouth every 6 (six) hours as needed, Starting Tue 11/1/2022, Historical Med      traMADol (ULTRAM) 50 mg tablet Take 50 mg by mouth every 6 (six) hours as needed, Starting Fri 10/14/2022, Historical Med      ULORIC 40 MG tablet Starting Sun 3/31/2019, Historical Med             No discharge procedures on file      PDMP Review     None          ED Provider  Electronically Signed by           Faiza Christy MD  11/10/22 2547

## 2022-11-10 NOTE — PROGRESS NOTES
[facility]        NAME: Victor Hugo Sotomayor is a 58 y o  male  : 1960    MRN: [id number]  DATE: November 10, 2022  TIME: 12:43 PM    Assessment and Plan   Pain and swelling of lower extremity, left [M79 605, M79 89]  1  Pain and swelling of lower extremity, left  Transfer to other facility     Concerned for DVT due to patient recent immobilization, calf swelling, calf tenderness, calf warmth, and diminished left lower extremity pulses  Well's Score: 3    Patient Instructions     Patient to be transferred to ER  Patient refused ambulance  Discussed the risks of not seeking immediate medical care with the patient  Patient signed ambulance refusal form  Patient states he will be driving himself to [facility] ER  Chief Complaint     Chief Complaint   Patient presents with   • Toe Pain     Right great x 3 days         History of Present Illness       Patient is a 71-year-old male with significant PMH of gout presenting in the clinic today for left toe pain x3 days  Patient states he was recently hospitalized on 2022 - 2022 for lumbar and sciatica pain  He has been recently treated with oxycodone and tramadol  Patient notes he has been immobile over the past 2 weeks  Stating he only has been walking about 10-20 steps a day  Denies recent trauma/fall/injury  Patient notes his uric acid levels were followed during his hospital stay  He states his uric acid levels were within normal limits over the past 2 weeks  Patient denies changes in his diet over the past 2 weeks  Admits swelling to the left calf, left ankle, left great toe  Denies fever, chest pain, sob, redness, and TTP  Admits the use of oxycodone with moderate relief of his great toe pain  Patient states he does not see a podiatrist          Review of Systems   Review of Systems   Constitutional: Negative for chills and fever  Respiratory: Negative for shortness of breath      Cardiovascular: Negative for chest pain  Musculoskeletal: Positive for arthralgias, back pain, gait problem and joint swelling  Skin: Positive for color change and pallor  Negative for rash           Current Medications       Current Outpatient Medications:   •  acetaminophen (TYLENOL) 325 mg tablet, Take 2 tablets (650 mg total) by mouth every 6 (six) hours as needed for mild pain, Disp: 30 tablet, Rfl: 2  •  colchicine (COLCRYS) 0 6 mg tablet, Take 0 6 mg by mouth daily, Disp: , Rfl:   •  indomethacin (INDOCIN) 50 mg capsule, , Disp: , Rfl:   •  oxyCODONE-acetaminophen (PERCOCET) 7 5-325 MG per tablet, Take 1 tablet by mouth every 6 (six) hours as needed, Disp: , Rfl:   •  traMADol (ULTRAM) 50 mg tablet, Take 50 mg by mouth every 6 (six) hours as needed, Disp: , Rfl:   •  ULORIC 40 MG tablet, , Disp: , Rfl: 0  •  gabapentin (NEURONTIN) 300 mg capsule, Take 1 capsule (300 mg total) by mouth daily at bedtime (Patient not taking: Reported on 11/10/2022), Disp: 30 capsule, Rfl: 0  •  HYDROmorphone (DILAUDID) 2 mg tablet, Take 1 tablet (2 mg total) by mouth every 4 (four) hours as needed for moderate pain Max Daily Amount: 12 mg (Patient not taking: Reported on 11/10/2022), Disp: 15 tablet, Rfl: 0    Current Allergies     Allergies as of 11/10/2022 - Reviewed 11/10/2022   Allergen Reaction Noted   • Morphine Shortness Of Breath 11/19/2017   • Oxycodone Shortness Of Breath 05/12/2019   • Cortisone Diarrhea, Abdominal Pain, and Fever 05/13/2019            The following portions of the patient's history were reviewed and updated as appropriate: allergies, current medications, past family history, past medical history, past social history, past surgical history and problem list      Past Medical History:   Diagnosis Date   • Back injury    • Basal cell carcinoma    • Gout        Past Surgical History:   Procedure Laterality Date   • ROTATOR CUFF REPAIR Right     with bicep repair   • SKIN CANCER EXCISION      BCCA chest and back       Family History   Problem Relation Age of Onset   • Colon cancer Mother    • Lung cancer Father    • No Known Problems Sister    • Cancer Maternal Grandmother    • Cancer Maternal Grandfather    • Bone cancer Paternal Grandmother    • No Known Problems Paternal Grandfather    • Bone cancer Maternal Aunt    • Cancer Maternal Aunt    • No Known Problems Maternal Aunt    • No Known Problems Maternal Aunt    • Throat cancer Cousin          Medications have been verified  Objective   BP (!) 172/100   Pulse 95   Temp 97 7 °F (36 5 °C)   Resp 20   Wt (!) 142 kg (313 lb)   SpO2 99%   BMI 41 30 kg/m²        Physical Exam     Physical Exam  Vitals reviewed  Constitutional:       General: He is not in acute distress  Appearance: Normal appearance  He is normal weight  He is not ill-appearing  HENT:      Head: Normocephalic and atraumatic  Nose: Nose normal       Mouth/Throat:      Mouth: Mucous membranes are moist    Eyes:      Conjunctiva/sclera: Conjunctivae normal    Cardiovascular:      Rate and Rhythm: Normal rate and regular rhythm  Pulses: Decreased pulses  Dorsalis pedis pulses are 1+ on the right side and 1+ on the left side  Posterior tibial pulses are 1+ on the right side and 1+ on the left side  Heart sounds: Normal heart sounds  No murmur heard  No friction rub  No gallop  Pulmonary:      Effort: Pulmonary effort is normal       Breath sounds: Normal breath sounds  No wheezing, rhonchi or rales  Abdominal:      General: Abdomen is flat  Bowel sounds are normal  There is no distension  Palpations: Abdomen is soft  Tenderness: There is no abdominal tenderness  There is no guarding  Musculoskeletal:         General: Swelling and tenderness present  Right lower le+ Pitting Edema present  Left lower leg: Swelling and tenderness (posterior and medial calf) present  1+ Pitting Edema present  Right ankle: Swelling present  Abnormal pulse  Left ankle: Swelling present  Tenderness (medial aspect) present  Normal range of motion  Abnormal pulse  Left foot: Swelling (great toe) and tenderness present  Abnormal pulse  Skin:     General: Skin is warm  Capillary Refill: Capillary refill takes less than 2 seconds  Neurological:      Mental Status: He is alert     Psychiatric:         Mood and Affect: Mood normal          Behavior: Behavior normal

## 2022-11-11 LAB
ATRIAL RATE: 91 BPM
P AXIS: 54 DEGREES
PR INTERVAL: 172 MS
QRS AXIS: 90 DEGREES
QRSD INTERVAL: 82 MS
QT INTERVAL: 354 MS
QTC INTERVAL: 435 MS
T WAVE AXIS: 33 DEGREES
VENTRICULAR RATE: 91 BPM

## 2023-07-18 ENCOUNTER — OFFICE VISIT (OUTPATIENT)
Dept: FAMILY MEDICINE CLINIC | Facility: CLINIC | Age: 63
End: 2023-07-18
Payer: COMMERCIAL

## 2023-07-18 VITALS
WEIGHT: 315 LBS | HEIGHT: 72 IN | SYSTOLIC BLOOD PRESSURE: 148 MMHG | TEMPERATURE: 97.1 F | BODY MASS INDEX: 42.66 KG/M2 | DIASTOLIC BLOOD PRESSURE: 92 MMHG

## 2023-07-18 DIAGNOSIS — M1A.00X0 IDIOPATHIC CHRONIC GOUT WITHOUT TOPHUS, UNSPECIFIED SITE: ICD-10-CM

## 2023-07-18 DIAGNOSIS — Z12.5 SCREENING FOR PROSTATE CANCER: ICD-10-CM

## 2023-07-18 DIAGNOSIS — E55.9 VITAMIN D DEFICIENCY: ICD-10-CM

## 2023-07-18 DIAGNOSIS — R63.5 WEIGHT GAIN: Primary | ICD-10-CM

## 2023-07-18 DIAGNOSIS — I10 PRIMARY HYPERTENSION: ICD-10-CM

## 2023-07-18 DIAGNOSIS — M10.00 IDIOPATHIC GOUT, UNSPECIFIED CHRONICITY, UNSPECIFIED SITE: Primary | ICD-10-CM

## 2023-07-18 PROBLEM — H91.90 HEARING LOSS: Status: ACTIVE | Noted: 2018-02-12

## 2023-07-18 PROCEDURE — 99214 OFFICE O/P EST MOD 30 MIN: CPT | Performed by: FAMILY MEDICINE

## 2023-07-18 RX ORDER — COLCHICINE 0.6 MG/1
0.6 TABLET ORAL DAILY
Qty: 90 TABLET | Refills: 3 | Status: CANCELLED | OUTPATIENT
Start: 2023-07-18

## 2023-07-18 RX ORDER — FEBUXOSTAT 40 MG/1
40 TABLET ORAL DAILY
Qty: 90 TABLET | Refills: 0 | Status: SHIPPED | OUTPATIENT
Start: 2023-07-18

## 2023-07-18 RX ORDER — OMEGA-3S/DHA/EPA/FISH OIL/D3 300MG-1000
400 CAPSULE ORAL DAILY
COMMUNITY

## 2023-07-18 RX ORDER — MOMETASONE FUROATE 50 UG/1
SPRAY, METERED NASAL
COMMUNITY
Start: 2023-05-22

## 2023-07-18 RX ORDER — COLCHICINE 0.6 MG/1
0.6 TABLET ORAL DAILY
Qty: 90 TABLET | Refills: 3 | Status: SHIPPED | OUTPATIENT
Start: 2023-07-18

## 2023-07-18 RX ORDER — FEBUXOSTAT 40 MG/1
40 TABLET ORAL DAILY
Qty: 90 TABLET | Refills: 3 | Status: CANCELLED | OUTPATIENT
Start: 2023-07-18

## 2023-07-18 NOTE — PROGRESS NOTES
Name: Florencio Rice      : 1960      MRN: [id number]  Encounter Provider: Eduin Velazquez MD  Encounter Date: 2023   Encounter department: [facility]     1. Weight gain  Comments:  Weight gain of questionable etiology we will check labs before discussing treatment options. Orders:  -     TSH, 3rd generation with Free T4 reflex; Future    2. Primary hypertension  Comments:  Advised patient obtain blood pressure cuff preferably Omron brand and monitor return here with cuff for comparison low-salt diet discussed no meds until further  Orders:  -     CBC and differential; Future  -     Comprehensive metabolic panel; Future  -     Lipid panel; Future    3. Vitamin D deficiency  -     Vitamin D 25 hydroxy; Future    4. Idiopathic chronic gout without tophus, unspecified site  Comments:  Patient cannot remember the last time he had an attack he takes meds as noted with Indocin only on a as needed basis. Orders:  -     Uric acid; Future  -     Uloric 40 MG tablet; Take 1 tablet (40 mg total) by mouth daily  -     colchicine (COLCRYS) 0.6 mg tablet; Take 1 tablet (0.6 mg total) by mouth daily    5. Screening for prostate cancer  -     PSA, Total Screen; Future      BMI Counseling: Body mass index is 44.11 kg/m². The BMI is above normal. Nutrition recommendations include decreasing portion sizes, encouraging healthy choices of fruits and vegetables, consuming healthier snacks, limiting drinks that contain sugar, moderation in carbohydrate intake and reducing intake of cholesterol. Exercise recommendations include moderate physical activity 150 minutes/week. No pharmacotherapy was ordered. Rationale for BMI follow-up plan is due to patient being overweight or obese. Depression Screening and Follow-up Plan: Patient was screened for depression during today's encounter. They screened negative with a PHQ-2 score of 0.         Subjective      Patient here due to needing refills on his prescription but also due to recent weight gain as well as a few high blood pressure readings. Unsure why he is gaining weight since he states his diet is without change and without change in medications although a little less active than in the past.,  Somewhat due to prior chronic problems with his back although symptoms improved from several months ago. He states he does monitor his sugar mainly since wife has diabetes sugars usually 100 or less than 100. Denies any family history of cardiac disease or diabetes. Family history of all sorts of cancer. Skippy done 1 year ago with a poor prep however showing polyps therefore due for repeat in 1 year. 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  He denies any headache, chest pain, shortness of breath, although has noticed some swelling in his lower legs. States he does not use the saltshaker but does not watch low-salt diet otherwise.     Review of Systems    Current Outpatient Medications on File Prior to Visit   Medication Sig   • acetaminophen (TYLENOL) 325 mg tablet Take 2 tablets (650 mg total) by mouth every 6 (six) hours as needed for mild pain   • cholecalciferol (VITAMIN D3) 400 units tablet Take 400 Units by mouth daily   • indomethacin (INDOCIN) 50 mg capsule    • mometasone (NASONEX) 50 mcg/act nasal spray SPRAY 2 SPRAYS INTO EACH NOSTRIL EVERY DAY   • [DISCONTINUED] colchicine (COLCRYS) 0.6 mg tablet Take 0.6 mg by mouth daily   • [DISCONTINUED] ULORIC 40 MG tablet    • gabapentin (NEURONTIN) 300 mg capsule Take 1 capsule (300 mg total) by mouth daily at bedtime (Patient not taking: Reported on 11/10/2022)   • HYDROmorphone (DILAUDID) 2 mg tablet Take 1 tablet (2 mg total) by mouth every 4 (four) hours as needed for moderate pain Max Daily Amount: 12 mg (Patient not taking: Reported on 11/10/2022)   • [DISCONTINUED] oxyCODONE-acetaminophen (PERCOCET) 7.5-325 MG per tablet Take 1 tablet by mouth every 6 (six) hours as needed (Patient not taking: Reported on 7/18/2023)   • [DISCONTINUED] traMADol (ULTRAM) 50 mg tablet Take 50 mg by mouth every 6 (six) hours as needed (Patient not taking: Reported on 7/18/2023)       Objective     /92 (BP Location: Left arm, Patient Position: Sitting, Cuff Size: Large)   Temp (!) 97.1 °F (36.2 °C) (Tympanic)   Ht 6' (1.829 m)   Wt (!) 148 kg (325 lb 3.2 oz)   BMI 44.11 kg/m²     Physical Exam  Constitutional:       Appearance: Normal appearance. Neck:      Vascular: No carotid bruit. Cardiovascular:      Rate and Rhythm: Normal rate and regular rhythm. Pulses: Normal pulses. Heart sounds: Normal heart sounds. Comments: He has +1 pitting edema about ankles bilaterally  Pulmonary:      Effort: Pulmonary effort is normal.      Breath sounds: Normal breath sounds. Abdominal:      Palpations: Abdomen is soft. There is no mass. Musculoskeletal:      Right lower leg: Edema present. Left lower leg: Edema present. Lymphadenopathy:      Cervical: No cervical adenopathy. Neurological:      Mental Status: He is alert and oriented to person, place, and time.        Usha Aguilar MD

## 2023-07-29 ENCOUNTER — APPOINTMENT (OUTPATIENT)
Dept: LAB | Facility: HOSPITAL | Age: 63
End: 2023-07-29
Payer: COMMERCIAL

## 2023-07-29 DIAGNOSIS — E55.9 VITAMIN D DEFICIENCY: ICD-10-CM

## 2023-07-29 DIAGNOSIS — R63.5 WEIGHT GAIN: ICD-10-CM

## 2023-07-29 DIAGNOSIS — M1A.00X0 IDIOPATHIC CHRONIC GOUT WITHOUT TOPHUS, UNSPECIFIED SITE: ICD-10-CM

## 2023-07-29 DIAGNOSIS — I10 PRIMARY HYPERTENSION: ICD-10-CM

## 2023-07-29 DIAGNOSIS — Z12.5 SCREENING FOR PROSTATE CANCER: ICD-10-CM

## 2023-07-29 LAB
25(OH)D3 SERPL-MCNC: 76.5 NG/ML (ref 30–100)
ALBUMIN SERPL BCP-MCNC: 4.2 G/DL (ref 3.5–5)
ALP SERPL-CCNC: 64 U/L (ref 34–104)
ALT SERPL W P-5'-P-CCNC: 23 U/L (ref 7–52)
ANION GAP SERPL CALCULATED.3IONS-SCNC: 6 MMOL/L
AST SERPL W P-5'-P-CCNC: 17 U/L (ref 13–39)
BASOPHILS # BLD AUTO: 0.04 THOUSANDS/ÂΜL (ref 0–0.1)
BASOPHILS NFR BLD AUTO: 1 % (ref 0–1)
BILIRUB SERPL-MCNC: 0.7 MG/DL (ref 0.2–1)
BUN SERPL-MCNC: 15 MG/DL (ref 5–25)
CALCIUM SERPL-MCNC: 9.4 MG/DL (ref 8.4–10.2)
CHLORIDE SERPL-SCNC: 104 MMOL/L (ref 96–108)
CHOLEST SERPL-MCNC: 148 MG/DL
CO2 SERPL-SCNC: 28 MMOL/L (ref 21–32)
CREAT SERPL-MCNC: 0.92 MG/DL (ref 0.6–1.3)
EOSINOPHIL # BLD AUTO: 0.18 THOUSAND/ÂΜL (ref 0–0.61)
EOSINOPHIL NFR BLD AUTO: 3 % (ref 0–6)
ERYTHROCYTE [DISTWIDTH] IN BLOOD BY AUTOMATED COUNT: 11.9 % (ref 11.6–15.1)
GFR SERPL CREATININE-BSD FRML MDRD: 88 ML/MIN/1.73SQ M
GLUCOSE P FAST SERPL-MCNC: 96 MG/DL (ref 65–99)
HCT VFR BLD AUTO: 45.9 % (ref 36.5–49.3)
HDLC SERPL-MCNC: 33 MG/DL
HGB BLD-MCNC: 15.1 G/DL (ref 12–17)
IMM GRANULOCYTES # BLD AUTO: 0.01 THOUSAND/UL (ref 0–0.2)
IMM GRANULOCYTES NFR BLD AUTO: 0 % (ref 0–2)
LDLC SERPL CALC-MCNC: 94 MG/DL (ref 0–100)
LYMPHOCYTES # BLD AUTO: 1.88 THOUSANDS/ÂΜL (ref 0.6–4.47)
LYMPHOCYTES NFR BLD AUTO: 32 % (ref 14–44)
MCH RBC QN AUTO: 29.7 PG (ref 26.8–34.3)
MCHC RBC AUTO-ENTMCNC: 32.9 G/DL (ref 31.4–37.4)
MCV RBC AUTO: 90 FL (ref 82–98)
MONOCYTES # BLD AUTO: 0.69 THOUSAND/ÂΜL (ref 0.17–1.22)
MONOCYTES NFR BLD AUTO: 12 % (ref 4–12)
NEUTROPHILS # BLD AUTO: 3.07 THOUSANDS/ÂΜL (ref 1.85–7.62)
NEUTS SEG NFR BLD AUTO: 52 % (ref 43–75)
NONHDLC SERPL-MCNC: 115 MG/DL
NRBC BLD AUTO-RTO: 0 /100 WBCS
PLATELET # BLD AUTO: 216 THOUSANDS/UL (ref 149–390)
PMV BLD AUTO: 9.2 FL (ref 8.9–12.7)
POTASSIUM SERPL-SCNC: 4.2 MMOL/L (ref 3.5–5.3)
PROT SERPL-MCNC: 7 G/DL (ref 6.4–8.4)
PSA SERPL-MCNC: 0.48 NG/ML (ref 0–4)
RBC # BLD AUTO: 5.09 MILLION/UL (ref 3.88–5.62)
SODIUM SERPL-SCNC: 138 MMOL/L (ref 135–147)
TRIGL SERPL-MCNC: 103 MG/DL
TSH SERPL DL<=0.05 MIU/L-ACNC: 1.51 UIU/ML (ref 0.45–4.5)
URATE SERPL-MCNC: 5.5 MG/DL (ref 3.5–8.5)
WBC # BLD AUTO: 5.87 THOUSAND/UL (ref 4.31–10.16)

## 2023-07-29 PROCEDURE — 84550 ASSAY OF BLOOD/URIC ACID: CPT

## 2023-07-29 PROCEDURE — 80061 LIPID PANEL: CPT

## 2023-07-29 PROCEDURE — 36415 COLL VENOUS BLD VENIPUNCTURE: CPT

## 2023-07-29 PROCEDURE — G0103 PSA SCREENING: HCPCS

## 2023-07-29 PROCEDURE — 84443 ASSAY THYROID STIM HORMONE: CPT

## 2023-07-29 PROCEDURE — 85025 COMPLETE CBC W/AUTO DIFF WBC: CPT

## 2023-07-29 PROCEDURE — 82306 VITAMIN D 25 HYDROXY: CPT

## 2023-07-29 PROCEDURE — 80053 COMPREHEN METABOLIC PANEL: CPT

## 2023-08-11 ENCOUNTER — RA CDI HCC (OUTPATIENT)
Dept: OTHER | Facility: HOSPITAL | Age: 63
End: 2023-08-11

## 2023-08-11 NOTE — PROGRESS NOTES
[facility] [address] [facility] coding opportunities       Chart reviewed, no opportunity found: CHART REVIEWED, NO OPPORTUNITY FOUND        Patients Insurance        Commercial Insurance: [employer]

## 2023-08-18 ENCOUNTER — OFFICE VISIT (OUTPATIENT)
Dept: FAMILY MEDICINE CLINIC | Facility: CLINIC | Age: 63
End: 2023-08-18
Payer: COMMERCIAL

## 2023-08-18 VITALS
SYSTOLIC BLOOD PRESSURE: 152 MMHG | BODY MASS INDEX: 41.75 KG/M2 | DIASTOLIC BLOOD PRESSURE: 82 MMHG | OXYGEN SATURATION: 97 % | TEMPERATURE: 96.5 F | HEIGHT: 73 IN | HEART RATE: 75 BPM | WEIGHT: 315 LBS

## 2023-08-18 DIAGNOSIS — E66.01 CLASS 3 SEVERE OBESITY DUE TO EXCESS CALORIES WITHOUT SERIOUS COMORBIDITY WITH BODY MASS INDEX (BMI) OF 40.0 TO 44.9 IN ADULT (HCC): ICD-10-CM

## 2023-08-18 DIAGNOSIS — I10 ESSENTIAL HYPERTENSION: Primary | ICD-10-CM

## 2023-08-18 PROCEDURE — 99213 OFFICE O/P EST LOW 20 MIN: CPT | Performed by: FAMILY MEDICINE

## 2023-08-18 RX ORDER — LISINOPRIL 10 MG/1
10 TABLET ORAL DAILY
Qty: 30 TABLET | Refills: 5 | Status: SHIPPED | OUTPATIENT
Start: 2023-08-18

## 2023-08-18 NOTE — PROGRESS NOTES
Name: Maxim Solano      : 1960      MRN: [id number]  Encounter Provider: Maricruz Jett MD  Encounter Date: 2023   Encounter department: [facility]     1. Essential hypertension  Assessment & Plan:  Discussed the meaning of systolic and diastolic components of BP reading and the consequences of uncontrolled high blood pressure. Also discussed the etiology most patients with high blood pressure. I encourage continued diet and he was very concerned that if he continues to lose weight may be he would not need to stay on medications forever. In interim since blood pressure still high will begin lisinopril he will call me in 3 to 4 weeks with further readings. Orders:  -     lisinopril (ZESTRIL) 10 mg tablet; Take 1 tablet (10 mg total) by mouth daily    2. Class 3 severe obesity due to excess calories without serious comorbidity with body mass index (BMI) of 40.0 to 44.9 in adult [facility])  Comments:  Encourage continued diet and very much encouraged an exercise program especially walking program.           Subjective      Patient here for follow-up regarding blood pressure and weight. He has completely changed his diet and is lost 6 pounds since last visit here a month ago. He feels great he has not yet started an exercise program.  He has many questions regarding his blood pressure and what the top number and the bottom number mean. His readings are still averaging 140-160/70-90 with heart rates mostly in the 70s he denies any known family history of hypertension or heart problems. He states his dad did have a stroke and there is quite a few family members with cancers including bone, lung, and breast.    Review of Systems   Constitutional: Positive for unexpected weight change. Negative for chills and fever. HENT: Negative for ear pain and sore throat. Eyes: Negative for pain and visual disturbance.    Respiratory: Negative for cough, chest tightness and shortness of breath. Cardiovascular: Negative for chest pain, palpitations and leg swelling. Gastrointestinal: Negative for abdominal pain and vomiting. Genitourinary: Negative for dysuria and hematuria. Musculoskeletal: Negative for arthralgias and back pain. Skin: Negative for color change and rash. Neurological: Negative for seizures and syncope. All other systems reviewed and are negative. Current Outpatient Medications on File Prior to Visit   Medication Sig   • acetaminophen (TYLENOL) 325 mg tablet Take 2 tablets (650 mg total) by mouth every 6 (six) hours as needed for mild pain   • cholecalciferol (VITAMIN D3) 400 units tablet Take 400 Units by mouth daily   • colchicine (COLCRYS) 0.6 mg tablet Take 1 tablet (0.6 mg total) by mouth daily   • indomethacin (INDOCIN) 50 mg capsule    • mometasone (NASONEX) 50 mcg/act nasal spray SPRAY 2 SPRAYS INTO EACH NOSTRIL EVERY DAY   • Uloric 40 MG tablet Take 1 tablet (40 mg total) by mouth daily   • gabapentin (NEURONTIN) 300 mg capsule Take 1 capsule (300 mg total) by mouth daily at bedtime (Patient not taking: Reported on 11/10/2022)   • HYDROmorphone (DILAUDID) 2 mg tablet Take 1 tablet (2 mg total) by mouth every 4 (four) hours as needed for moderate pain Max Daily Amount: 12 mg (Patient not taking: Reported on 11/10/2022)       Objective     /82 (BP Location: Left arm, Patient Position: Sitting, Cuff Size: Adult)   Pulse 75   Temp (!) 96.5 °F (35.8 °C) (Tympanic)   Ht 6' 1" (1.854 m)   Wt (!) 145 kg (319 lb 12.8 oz)   SpO2 97%   BMI 42.19 kg/m²     Physical Exam  Cardiovascular:      Rate and Rhythm: Normal rate and regular rhythm. Pulses: Normal pulses. Heart sounds: Normal heart sounds. No murmur heard. Pulmonary:      Effort: Pulmonary effort is normal.      Breath sounds: Normal breath sounds. Musculoskeletal:      Right lower leg: No edema. Left lower leg: No edema.        Jarad Chaudhary MD

## 2023-08-18 NOTE — ASSESSMENT & PLAN NOTE
Discussed the meaning of systolic and diastolic components of BP reading and the consequences of uncontrolled high blood pressure. Also discussed the etiology most patients with high blood pressure. I encourage continued diet and he was very concerned that if he continues to lose weight may be he would not need to stay on medications forever. In interim since blood pressure still high will begin lisinopril he will call me in 3 to 4 weeks with further readings.

## 2023-09-19 ENCOUNTER — TELEMEDICINE (OUTPATIENT)
Dept: FAMILY MEDICINE CLINIC | Facility: CLINIC | Age: 63
End: 2023-09-19
Payer: COMMERCIAL

## 2023-09-19 DIAGNOSIS — U07.1 COVID: ICD-10-CM

## 2023-09-19 DIAGNOSIS — U07.1 COVID: Primary | ICD-10-CM

## 2023-09-19 PROCEDURE — 99213 OFFICE O/P EST LOW 20 MIN: CPT | Performed by: FAMILY MEDICINE

## 2023-09-19 RX ORDER — NIRMATRELVIR AND RITONAVIR 300-100 MG
3 KIT ORAL 2 TIMES DAILY
Qty: 30 TABLET | Refills: 0 | Status: SHIPPED | OUTPATIENT
Start: 2023-09-19 | End: 2023-09-24

## 2023-09-19 RX ORDER — NIRMATRELVIR AND RITONAVIR 300-100 MG
3 KIT ORAL 2 TIMES DAILY
Qty: 30 TABLET | Refills: 0 | Status: SHIPPED | OUTPATIENT
Start: 2023-09-19 | End: 2023-09-19 | Stop reason: SDUPTHER

## 2023-09-19 NOTE — PROGRESS NOTES
COVID-19 Outpatient Progress Note    Assessment/Plan:    Problem List Items Addressed This Visit    None  Visit Diagnoses     COVID    -  Primary    Willing to try paxlovid, discussed quarantine period and masking. F/u prn. Relevant Medications    nirmatrelvir & ritonavir (Paxlovid, 300/100,) tablet therapy pack         Disposition:     Has had COVID previously, most recently 2 years ago. Patient meets criteria for PAXLOVID and they have been counseled appropriately according to EUA documentation released by the FDA. After discussion, patient agrees to treatment. Di Charlotte is an investigational medicine used to treat mild-to-moderate COVID-19 in adults and children (15years of age and older weighing at least 80 pounds (40 kg)) with positive results of direct SARS-CoV-2 viral testing, and who are at high risk for progression to severe COVID-19, including hospitalization or death. PAXLOVID is investigational because it is still being studied. There is limited information about the safety and effectiveness of using PAXLOVID to treat people with mild-to-moderate COVID-19. The FDA has authorized the emergency use of PAXLOVID for the treatment of mild-tomoderate COVID-19 in adults and children (15years of age and older weighing at least 80 pounds (40 kg)) with a positive test for the virus that causes COVID-19, and who are at high risk for progression to severe COVID-19, including hospitalization or death, under an EUA. What should I tell my healthcare provider before I take PAXLOVID? Tell your healthcare provider if you:  - Have any allergies  - Have liver or kidney disease  - Are pregnant or plan to become pregnant  - Are breastfeeding a child  - Have any serious illnesses    Tell your healthcare provider about all the medicines you take, including prescription and over-the-counter medicines, vitamins, and herbal supplements.  Some medicines may interact with PAXLOVID and may cause serious side effects. Keep a list of your medicines to show your healthcare provider and pharmacist when you get a new medicine. You can ask your healthcare provider or pharmacist for a list of medicines that interact with PAXLOVID. Do not start taking a new medicine without telling your healthcare provider. Your healthcare provider can tell you if it is safe to take PAXLOVID with other medicines. Tell your healthcare provider if you are taking combined hormonal contraceptive. PAXLOVID may affect how your birth control pills work. Females who are able to become pregnant should use another effective alternative form of contraception or an additional barrier method of contraception. Talk to your healthcare provider if you have any questions about contraceptive methods that might be right for you. How do I take PAXLOVID? PAXLOVID consists of 2 medicines: nirmatrelvir and ritonavir. - Take 2 pink tablets of nirmatrelvir with 1 white tablet of ritonavir by mouth 2 times each day (in the morning and in the evening) for 5 days. For each dose, take all 3 tablets at the same time. - If you have kidney disease, talk to your healthcare provider. You may need a different dose. - Swallow the tablets whole. Do not chew, break, or crush the tablets  - Take PAXLOVID with or without food. - Do not stop taking PAXLOVID without talking to your healthcare provider, even if you feel better. - If you miss a dose of PAXLOVID within 8 hours of the time it is usually taken, take it as soon as you remember. If you miss a dose by more than 8 hours, skip the missed dose and take the next dose at your regular time. Do not take 2 doses of PAXLOVID at the same time. - If you take too much PAXLOVID, call your healthcare provider or go to the nearest hospital emergency room right away.   - If you are taking a ritonavir- or cobicistat-containing medicine to treat hepatitis C or Human Immunodeficiency Virus (HIV), you should continue to take your medicine as prescribed by your healthcare provider.  - Talk to your healthcare provider if you do not feel better or if you feel worse after 5 days. Who should generally not take PAXLOVID? Do not take PAXLOVID if:  You are allergic to nirmatrelvir, ritonavir, or any of the ingredients in PAXLOVID. You are taking any of the following medicines:  - Alfuzosin  - Pethidine, piroxicam, propoxyphene  - Ranolazine  - Amiodarone, dronedarone, flecainide, propafenone, quinidine  - Colchicine  - Lurasidone, pimozide, clozapine  - Dihydroergotamine, ergotamine, methylergonovine  - Lovastatin, simvastatin  - Sildenafil (Revatio®) for pulmonary arterial hypertension (PAH)  - Triazolam, oral midazolam  - Apalutamide  - Carbamazepine, phenobarbital, phenytoin  - Rifampin  - Brandon’s Wort (hypericum perforatum)    What are the important possible side effects of PAXLOVID? Possible side effects of PAXLOVID are:  - Liver Problems. Tell your healthcare provider right away if you have any of these signs and symptoms of liver problems: loss of appetite, yellowing of your skin and the whites of eyes (jaundice), dark-colored urine, pale colored stools and itchy skin, stomach area (abdominal) pain. - Resistance to HIV Medicines. If you have untreated HIV infection, PAXLOVID may lead to some HIV medicines not working as well in the future. - Other possible side effects include: altered sense of taste, diarrhea, high blood pressure, or muscle aches    These are not all the possible side effects of PAXLOVID. Not many people have taken PAXLOVID. Serious and unexpected side effects may happen. Wolfgang Burciaga is still being studied, so it is possible that all of the risks are not known at this time. What other treatment choices are there? Like Donnell High may allow for the emergency use of other medicines to treat people with COVID-19.  Go to https://Mobile[employer]/ for information on the emergency use of other medicines that are authorized by FDA to treat people with COVID-19. Your healthcare provider may talk with you about clinical trials for which you may be eligible. It is your choice to be treated or not to be treated with PAXLOVID. Should you decide not to receive it or for your child not to receive it, it will not change your standard medical care. What if I am pregnant or breastfeeding? There is no experience treating pregnant women or breastfeeding mothers with PAXLOVID. For a mother and unborn baby, the benefit of taking PAXLOVID may be greater than the risk from the treatment. If you are pregnant, discuss your options and specific situation with your healthcare provider. It is recommended that you use effective barrier contraception or do not have sexual activity while taking PAXLOVID. If you are breastfeeding, discuss your options and specific situation with your healthcare provider. How do I report side effects with PAXLOVID? Contact your healthcare provider if you have any side effects that bother you or do not go away. Report side effects to FDA MedWatch at www.fda.gov/medwatch or call 0-320-MBU1958 or you can report side effects to [employer]. at the contact information provided below. Website Fax number Telephone number   [employer] [phone]     How should I store Ashutosh Ortiz? Store PAXLOVID tablets at room temperature between 68°F to 77°F (20°C to 25°C). Full fact sheet for patients, parents, and caregivers can be found at: [employer].co.za    I have spent a total time of 8 minutes on the day of the encounter for this patient including risks and benefits of treatment options, instructions for management and risk factor reductions. Encounter provider: Sherrell Ravi DO     Provider located at: [address] [facility] [place] [address] [phone]     Recent Visits  No visits were found meeting these conditions. Showing recent visits within past 7 days and meeting all other requirements  Today's Visits  Date Type Provider Dept   09/19/23 Telemedicine Sherrell Ravi DO [facility]   Showing today's visits and meeting all other requirements  Future Appointments  No visits were found meeting these conditions. Showing future appointments within next 150 days and meeting all other requirements     This virtual check-in was done via [address] [place] [address] and patient was informed that this is a secure, HIPAA-compliant platform. He agrees to proceed. Patient agrees to participate in a virtual check in via telephone or video visit instead of presenting to the office to address urgent/immediate medical needs. Patient is aware this is a billable service. He acknowledged consent and understanding of privacy and security of the video platform. The patient has agreed to participate and understands they can discontinue the visit at any time. After connecting through [facility], the patient was identified by name and date of birth. Jeff Elder was informed that this was a telemedicine visit and that the exam was being conducted confidentially over secure lines. Jeff Elder acknowledged consent and understanding of privacy and security of the telemedicine visit. I informed the patient that I have reviewed his record in Epic and presented the opportunity for him to ask any questions regarding the visit today. The patient agreed to participate. Verification of patient location:  Patient is located in the following state in which I hold an active license: [place]    Subjective:   Jeff Elder is a 61 y.o. male who is concerned about COVID-19. Patient's symptoms include chills, fatigue, nasal congestion, rhinorrhea, sore throat, cough and myalgias. Patient denies fever, loss of taste, shortness of breath, chest tightness, nausea, vomiting and diarrhea. COVID-19 vaccination status: Partially vaccinated    Lab Results   Component Value Date    SARSCOV2 Negative 10/24/2022    SARSCOV2 Not Detected 12/04/2020       Review of Systems   Constitutional: Positive for chills and fatigue. Negative for fever. HENT: Positive for congestion, rhinorrhea and sore throat. Respiratory: Positive for cough. Negative for chest tightness and shortness of breath. Gastrointestinal: Negative for diarrhea, nausea and vomiting. Musculoskeletal: Positive for myalgias. Current Outpatient Medications on File Prior to Visit   Medication Sig   • acetaminophen (TYLENOL) 325 mg tablet Take 2 tablets (650 mg total) by mouth every 6 (six) hours as needed for mild pain   • cholecalciferol (VITAMIN D3) 400 units tablet Take 400 Units by mouth daily   • colchicine (COLCRYS) 0.6 mg tablet Take 1 tablet (0.6 mg total) by mouth daily   • gabapentin (NEURONTIN) 300 mg capsule Take 1 capsule (300 mg total) by mouth daily at bedtime (Patient not taking: Reported on 11/10/2022)   • HYDROmorphone (DILAUDID) 2 mg tablet Take 1 tablet (2 mg total) by mouth every 4 (four) hours as needed for moderate pain Max Daily Amount: 12 mg (Patient not taking: Reported on 11/10/2022)   • indomethacin (INDOCIN) 50 mg capsule    • lisinopril (ZESTRIL) 10 mg tablet Take 1 tablet (10 mg total) by mouth daily   • mometasone (NASONEX) 50 mcg/act nasal spray SPRAY 2 SPRAYS INTO EACH NOSTRIL EVERY DAY   • Uloric 40 MG tablet Take 1 tablet (40 mg total) by mouth daily       Objective: There were no vitals taken for this visit. Physical Exam  Constitutional:       Appearance: Normal appearance. He is normal weight.    Pulmonary:      Effort: Pulmonary effort is normal.   Neurological: Mental Status: He is alert. Psychiatric:         Mood and Affect: Mood normal.         Behavior: Behavior normal.         Thought Content:  Thought content normal.         Judgment: Judgment normal.       Emerson Ray,

## 2023-10-03 DIAGNOSIS — I10 ESSENTIAL HYPERTENSION: Primary | ICD-10-CM

## 2023-10-03 RX ORDER — LISINOPRIL 20 MG/1
20 TABLET ORAL DAILY
Qty: 90 TABLET | Refills: 1 | Status: SHIPPED | OUTPATIENT
Start: 2023-10-03

## 2023-10-03 RX ORDER — LISINOPRIL 20 MG/1
20 TABLET ORAL DAILY
COMMUNITY
End: 2023-10-03 | Stop reason: SDUPTHER

## 2023-10-03 NOTE — TELEPHONE ENCOUNTER
Patient called stating he needs new script for his lisinopril since Dr Jorden Lopez increased lisinopril to 20mg daily. Also gave readings which will be scanned in chart for review. Would like script sent to [facility] for Lisinopril 20mg daily, 90 and refills if to continue.

## 2023-12-08 ENCOUNTER — OFFICE VISIT (OUTPATIENT)
Dept: FAMILY MEDICINE CLINIC | Facility: CLINIC | Age: 63
End: 2023-12-08
Payer: COMMERCIAL

## 2023-12-08 VITALS
OXYGEN SATURATION: 98 % | WEIGHT: 313 LBS | HEIGHT: 73 IN | DIASTOLIC BLOOD PRESSURE: 82 MMHG | SYSTOLIC BLOOD PRESSURE: 140 MMHG | TEMPERATURE: 96.9 F | HEART RATE: 69 BPM | BODY MASS INDEX: 41.48 KG/M2

## 2023-12-08 DIAGNOSIS — G89.29 CHRONIC LEFT SHOULDER PAIN: ICD-10-CM

## 2023-12-08 DIAGNOSIS — Z23 NEED FOR TDAP VACCINATION: ICD-10-CM

## 2023-12-08 DIAGNOSIS — R13.12 OROPHARYNGEAL DYSPHAGIA: ICD-10-CM

## 2023-12-08 DIAGNOSIS — I10 ESSENTIAL HYPERTENSION: Primary | ICD-10-CM

## 2023-12-08 DIAGNOSIS — M25.512 CHRONIC LEFT SHOULDER PAIN: ICD-10-CM

## 2023-12-08 DIAGNOSIS — R09.82 PND (POST-NASAL DRIP): ICD-10-CM

## 2023-12-08 DIAGNOSIS — R06.83 SNORING: ICD-10-CM

## 2023-12-08 DIAGNOSIS — Z23 ENCOUNTER FOR IMMUNIZATION: ICD-10-CM

## 2023-12-08 DIAGNOSIS — R05.3 CHRONIC COUGH: ICD-10-CM

## 2023-12-08 DIAGNOSIS — I83.92 ASYMPTOMATIC VARICOSE VEINS OF LEFT LOWER EXTREMITY: ICD-10-CM

## 2023-12-08 PROCEDURE — 90471 IMMUNIZATION ADMIN: CPT

## 2023-12-08 PROCEDURE — 90715 TDAP VACCINE 7 YRS/> IM: CPT

## 2023-12-08 PROCEDURE — 99214 OFFICE O/P EST MOD 30 MIN: CPT | Performed by: FAMILY MEDICINE

## 2023-12-08 RX ORDER — LISINOPRIL 40 MG/1
40 TABLET ORAL DAILY
Qty: 90 TABLET | Refills: 3 | Status: SHIPPED | OUTPATIENT
Start: 2023-12-08

## 2023-12-08 NOTE — PROGRESS NOTES
I have reviewed discharge instructions with the patient. The patient verbalized understanding. Patient armband removed and shredded. patient given 1 prescriptions. Discussed side effects with patient. patient verbalized understanding. Patient ambulated to lobby with steady gait with . Patient discharged in stable condition to care of self. Name: Brian Giraldo      : 1960      MRN: [id number]  Encounter Provider: Guillermo Carrasco MD  Encounter Date: 2023   Encounter department: [address] [place] [address]     1. Essential hypertension  Assessment & Plan:  Increase lisinopril to 40 mg daily. Continue low-salt diet and monitoring. Call in 2 to 3 weeks with readings. Orders:  -     lisinopril (ZESTRIL) 40 mg tablet; Take 1 tablet (40 mg total) by mouth daily    2. Chronic cough  Comments:  Hopefully not due to lisinopril. Call in 2 to 3 weeks after treatment for postnasal drip with status   if no better - further W/U and ? trial off lisinopril    3. PND (post-nasal drip)  Comments:  allegra   nasonex bid  Call in 2-3 weeks with status    4. Snoring  Comments:  ? sleep apnea   pt NOT intesrested in test now! 5. Oropharyngeal dysphagia  Comments:  ? FURTHER w/u PENDING STATUS WITH TX AS ABOVE    6. Encounter for immunization  Comments:  encouraged new covid and shingrix   no need for RSV    7. Need for Tdap vaccination  -     TDAP VACCINE GREATER THAN OR EQUAL TO 6YO IM    8. Chronic left shoulder pain  Comments:  to Dr Windy Page    9. Asymptomatic varicose veins of left lower extremity  Comments:  reassured discoloration due to varicose veins - small and asympt  leg elevation when possible and wt loss           Subjective      Patient inadvertently was taking to 2  20 mg pills of lisinopril instead of 1 pill with resultant marked improvement of blood pressures. He was unsure if he should continue this therefore went back to 1 pill daily, now with higher blood pressures. Patient states he is following low-salt diet but really has a little time for exercise. He denies any headaches other than as noted below, chest pain, shortness of breath, or palpitations.   He occasionally does note some swelling of his ankles by the end of the day and wife is concerned about purplish discoloration especially of his left ankle and foot. Patient denies any pain or numbness of his feet    Is complaining of chronic left shoulder pain apparently due to a diagnosis of rotator cuff injury in the past.  He had surgery on his right shoulder in the [facility] by Dr. Meeta Drew with marked improvement of symptoms and he feels its time for repeat eval by Dr. Meeta Drew. He does have constant pain even bothering him at nighttime. He is right-handed. He has a chronic cough which he and his wife state is not due to lisinopril since present prior to being on this medication. Patient complains of a can continue his postnasal drip with cough productive of sputum he is unsure of the color. He also has rhinorrhea. He states symptoms are worse in the morning and also with position changes of his neck especially of turning from left to right with feelings at that time of inability to swallow however he denies sore throat and denies any difficulty swallowing liquids and/or food. He denies any indigestion or heartburn. He sleeps on his stomach. Wife has noted him snoring and occasionally holding his breath. Patient states when he hits the pillow he sleeps pretty soundly for 7 to 8 hours up maybe once a night to void. He is not a restless sleeper. He denies feeling tired during the day other than when working long shifts.       Review of Systems    Current Outpatient Medications on File Prior to Visit   Medication Sig    acetaminophen (TYLENOL) 325 mg tablet Take 2 tablets (650 mg total) by mouth every 6 (six) hours as needed for mild pain    cholecalciferol (VITAMIN D3) 400 units tablet Take 400 Units by mouth daily    colchicine (COLCRYS) 0.6 mg tablet Take 1 tablet (0.6 mg total) by mouth daily    indomethacin (INDOCIN) 50 mg capsule     mometasone (NASONEX) 50 mcg/act nasal spray SPRAY 2 SPRAYS INTO EACH NOSTRIL EVERY DAY    Uloric 40 MG tablet Take 1 tablet (40 mg total) by mouth daily    [DISCONTINUED] lisinopril (ZESTRIL) 20 mg tablet Take 1 tablet (20 mg total) by mouth daily    [DISCONTINUED] gabapentin (NEURONTIN) 300 mg capsule Take 1 capsule (300 mg total) by mouth daily at bedtime (Patient not taking: Reported on 11/10/2022)    [DISCONTINUED] HYDROmorphone (DILAUDID) 2 mg tablet Take 1 tablet (2 mg total) by mouth every 4 (four) hours as needed for moderate pain Max Daily Amount: 12 mg (Patient not taking: Reported on 11/10/2022)    [DISCONTINUED] lisinopril (ZESTRIL) 10 mg tablet Take 1 tablet (10 mg total) by mouth daily (Patient not taking: Reported on 12/8/2023)       Objective     /82 (BP Location: Left arm, Patient Position: Sitting, Cuff Size: Large)   Pulse 69   Temp (!) 96.9 °F (36.1 °C) (Tympanic)   Ht 6' 1" (1.854 m)   Wt (!) 142 kg (313 lb)   SpO2 98%   BMI 41.30 kg/m²     Physical Exam  Constitutional:       Appearance: Normal appearance. He is obese. HENT:      Right Ear: Tympanic membrane and ear canal normal.      Left Ear: Tympanic membrane and ear canal normal.      Nose: Congestion present. Comments: Sinuses without TTP     Mouth/Throat:      Mouth: Mucous membranes are moist.      Pharynx: No oropharyngeal exudate or posterior oropharyngeal erythema. Neck:      Vascular: No carotid bruit. Cardiovascular:      Rate and Rhythm: Normal rate and regular rhythm. Pulses: Normal pulses. Heart sounds: Normal heart sounds. No murmur heard. Comments: Trace non-pitting edema about both ankles   +++ superficial varicosites around left lower leg and inner aspect of ankle with vioolaceous discoloration  Pulmonary:      Effort: Pulmonary effort is normal.      Breath sounds: Normal breath sounds. Musculoskeletal:      Right lower leg: Edema present. Left lower leg: Edema present. Lymphadenopathy:      Cervical: No cervical adenopathy. Neurological:      Mental Status: He is alert and oriented to person, place, and time.        Michael Yeh MD

## 2023-12-08 NOTE — ASSESSMENT & PLAN NOTE
Blood pressures currently well controlled. I will check a BMP to monitor potassium level. Continue chlorthalidone and potassium. Increase lisinopril to 40 mg daily. Continue low-salt diet and monitoring. Call in 2 to 3 weeks with readings.

## 2024-04-18 DIAGNOSIS — M10.9 GOUT, UNSPECIFIED CAUSE, UNSPECIFIED CHRONICITY, UNSPECIFIED SITE: Primary | ICD-10-CM

## 2024-04-18 RX ORDER — INDOMETHACIN 50 MG/1
50 CAPSULE ORAL
Qty: 30 CAPSULE | Refills: 1 | Status: SHIPPED | OUTPATIENT
Start: 2024-04-18

## 2024-06-05 ENCOUNTER — RA CDI HCC (OUTPATIENT)
Dept: OTHER | Facility: HOSPITAL | Age: 64
End: 2024-06-05

## 2024-06-12 ENCOUNTER — TELEPHONE (OUTPATIENT)
Age: 64
End: 2024-06-12

## 2024-06-12 NOTE — TELEPHONE ENCOUNTER
Patient called in as he realized he missed his appointment.  He did e check in and thought it was for tomorrow.  Patient would like to schedule appointment again.      The cancelled appointment says OVL 6 month long.   Patient is also due for his AP he stated.      [facility] [employer] no appointments available for OVL until October (office must also schedule OVL_.  Please contact patient about setting up his appointment again that he missed.

## 2024-06-20 ENCOUNTER — OFFICE VISIT (OUTPATIENT)
Dept: FAMILY MEDICINE CLINIC | Facility: CLINIC | Age: 64
End: 2024-06-20
Payer: COMMERCIAL

## 2024-06-20 VITALS
SYSTOLIC BLOOD PRESSURE: 138 MMHG | OXYGEN SATURATION: 96 % | WEIGHT: 315 LBS | BODY MASS INDEX: 41.75 KG/M2 | HEIGHT: 73 IN | HEART RATE: 73 BPM | DIASTOLIC BLOOD PRESSURE: 80 MMHG | TEMPERATURE: 97.8 F

## 2024-06-20 DIAGNOSIS — Z00.00 WELL ADULT EXAM: Primary | ICD-10-CM

## 2024-06-20 DIAGNOSIS — E66.01 CLASS 3 SEVERE OBESITY WITH SERIOUS COMORBIDITY AND BODY MASS INDEX (BMI) OF 40.0 TO 44.9 IN ADULT, UNSPECIFIED OBESITY TYPE (HCC): ICD-10-CM

## 2024-06-20 DIAGNOSIS — R73.09 ELEVATED GLUCOSE: ICD-10-CM

## 2024-06-20 DIAGNOSIS — Z12.5 SCREENING FOR MALIGNANT NEOPLASM OF PROSTATE: ICD-10-CM

## 2024-06-20 DIAGNOSIS — M79.671 PAIN IN BOTH FEET: ICD-10-CM

## 2024-06-20 DIAGNOSIS — R63.5 WEIGHT GAIN: ICD-10-CM

## 2024-06-20 DIAGNOSIS — R10.32 LEFT LOWER QUADRANT ABDOMINAL PAIN: ICD-10-CM

## 2024-06-20 DIAGNOSIS — R09.89 CHOKING EPISODE OCCURRING AT NIGHT: ICD-10-CM

## 2024-06-20 DIAGNOSIS — I10 ESSENTIAL HYPERTENSION: ICD-10-CM

## 2024-06-20 DIAGNOSIS — R05.1 ACUTE COUGH: ICD-10-CM

## 2024-06-20 DIAGNOSIS — Z13.220 SCREENING CHOLESTEROL LEVEL: ICD-10-CM

## 2024-06-20 DIAGNOSIS — M79.672 PAIN IN BOTH FEET: ICD-10-CM

## 2024-06-20 PROCEDURE — 99213 OFFICE O/P EST LOW 20 MIN: CPT | Performed by: NURSE PRACTITIONER

## 2024-06-20 PROCEDURE — 99396 PREV VISIT EST AGE 40-64: CPT | Performed by: NURSE PRACTITIONER

## 2024-06-20 NOTE — PROGRESS NOTES
Ambulatory Visit  Name: Maurice Crawford      : 1960      MRN: [id number]  Encounter Provider: ZBIGNIEW Burt  Encounter Date: 2024   Encounter department: [facility] PRIMARY CARE    Assessment & Plan   1. Well adult exam  2. Weight gain  Comments:  Rule out fluid weight gain, counseled about need to intervene he wants to just begin with diet and exercise when his schedule allows  Orders:  -     TSH, 3rd generation with Free T4 reflex; Future  -     B-Type Natriuretic Peptide(BNP); Future  3. Essential hypertension  Comments:  Stable, we may need to consider med change depending on ENT  Orders:  -     Comprehensive metabolic panel; Future  4. Screening cholesterol level  -     Lipid Panel with Direct LDL reflex; Future  5. Acute cough  Comments:  Chronic, ENT issue versus med  Orders:  -     Ambulatory Referral to Otolaryngology; Future  6. Left lower quadrant abdominal pain  Comments:  Chronic he he wants to discuss with colorectal he does not want any other intervention prior  Orders:  -     Comprehensive metabolic panel; Future  -     CBC and differential; Future  -     Ambulatory Referral to Colorectal Surgery; Future  7. Choking episode occurring at night  8. Screening for malignant neoplasm of prostate  -     PSA, Total Screen; Future  9. Pain in both feet  -     Ambulatory Referral to Podiatry; Future  10. Elevated glucose  -     Hemoglobin A1C; Future  11. Class 3 severe obesity with serious comorbidity and body mass index (BMI) of 40.0 to 44.9 in adult, unspecified obesity type (HCC)  Comments:  We will revisit this in the future after workup complete again he is really does not want any kind of medical or surgical intervention     History of Present Illness   {Disappearing Hyperlinks I Encounters * My Last Note * Since Last Visit * History :00888}  Here for well visit.  Does eat fruits and vegetables most days.  He does not think he has changed his food intake.  He is to  "exercise when he got a chance.   makes it hard to do  Overall feels okay but he does have a couple of ongoing issues that he continues to experience.  He has had a cough for 6 to 12 months  , his wife was concerned that it started with the lisinopril, he thinks he has had it for quite a while.  He feels that food seems to get stuck in the oropharyngeal phase he does not really feel like food is stuck in his upper esophagus it is more that he just triggers a choking reaction as he swallows..  Also he has been having left lower quadrant pain for the last 6 to 12 months he does not know that he never felt quite right since he got 2 polyps removed 2 years ago.  At any rate it is time to get those polyps removed again so he would like to be referred to Dr. Lisa.  Having chronic foot pain so he would like to see a podiatrist.  I note an 11 pound weight gain since the fall.  He does not think he has changed his food intake or his exercise level.  Mild fatigue no chest pain or shortness of breath.  Was originally planning to retire already but now realizes he has to wait another year.  He drives truck for living.  I inquired about edema and he feels he might retain just a little bit.  No diarrhea or blood in his stool  He had an elevated glucose a year or 2 ago so an A1c was checked and normal at the time  When he has checked his using his wife's it is usually 100 or less.        Review of Systems    Objective   {Disappearing Hyperlinks   Review Vitals * Enter New Vitals * Results Review * Labs * Imaging * Cardiology * Procedures * Lung Cancer Screening :91031}  /80 (BP Location: Left arm, Patient Position: Sitting, Cuff Size: Large)   Pulse 73   Temp 97.8 °F (36.6 °C) (Tympanic)   Ht 6' 1\" (1.854 m)   Wt (!) 147 kg (324 lb 12.8 oz)   SpO2 96%   BMI 42.85 kg/m²     Physical Exam  HENT:      Right Ear: Tympanic membrane normal.      Left Ear: Tympanic membrane normal.      Nose: Congestion (Mild) " present. No rhinorrhea.      Mouth/Throat:      Pharynx: No posterior oropharyngeal erythema.   Cardiovascular:      Rate and Rhythm: Normal rate and regular rhythm.   Pulmonary:      Effort: Pulmonary effort is normal.      Breath sounds: Normal breath sounds.      Comments: Occasional cough during exam  Abdominal:      General: Bowel sounds are normal.      Tenderness: There is abdominal tenderness.      Comments: Hyper bowel sounds.  Large abdomen it is soft and nontender for the most part except for his very left lower quadrant bordering on pelvic   Lymphadenopathy:      Cervical: No cervical adenopathy.   Psychiatric:         Mood and Affect: Mood normal.       Administrative Statements {Disappearing Hyperlinks I  Level of Service * [facility]/PCSP:[id number]}

## 2024-07-08 ENCOUNTER — HOSPITAL ENCOUNTER (OUTPATIENT)
Dept: CT IMAGING | Facility: HOSPITAL | Age: 64
Discharge: HOME | End: 2024-07-08
Payer: COMMERCIAL

## 2024-07-08 DIAGNOSIS — R10.32 ABDOMINAL PAIN, LEFT LOWER QUADRANT: ICD-10-CM

## 2024-07-08 PROCEDURE — 74177 CT ABD & PELVIS W/CONTRAST: CPT

## 2024-07-08 RX ADMIN — IOHEXOL 100 ML: 350 INJECTION, SOLUTION INTRAVENOUS at 10:33

## 2024-08-20 ENCOUNTER — TELEPHONE (OUTPATIENT)
Age: 64
End: 2024-08-20

## 2024-08-20 DIAGNOSIS — M1A.00X0 IDIOPATHIC CHRONIC GOUT WITHOUT TOPHUS, UNSPECIFIED SITE: ICD-10-CM

## 2024-08-20 RX ORDER — FEBUXOSTAT 40 MG/1
40 TABLET ORAL DAILY
Qty: 100 TABLET | Refills: 1 | Status: SHIPPED | OUTPATIENT
Start: 2024-08-20

## 2024-08-20 RX ORDER — COLCHICINE 0.6 MG/1
0.6 TABLET ORAL DAILY
Qty: 90 TABLET | Refills: 0 | Status: SHIPPED | OUTPATIENT
Start: 2024-08-20

## 2024-08-20 NOTE — TELEPHONE ENCOUNTER
Reason for call:   [x] Refill   [] Prior Auth  [] Other:     Office:   [x] PCP/Provider -   [] Specialty/Provider -     Medication:   Colchine 0.6mg- take 1 tablet by mouth daily   Uloric Acid 40mg- take 1 tablet by mouth daily    Pharmacy: [facility] [place]    Does the patient have enough for 3 days?   [] Yes   [x] No - Send as HP to POD

## 2024-08-22 NOTE — TELEPHONE ENCOUNTER
Patient called to check in this medication and I advised him there was a prior auth and change in the medication. Patient states that he cannot take allopurinol it makes his gout worse.   Patient doesn't know why this needs to be changed and he wants the medication to be the generic for Uloric.

## 2024-08-22 NOTE — TELEPHONE ENCOUNTER
PA for ULORIC 40 MG SUBMITTED     via    [][facility]-KEY:   [x][facility]-Case ID # [id number]  []Faxed to plan   []Other website   []Phone call Case ID #     Office notes sent, clinical questions answered. Awaiting determination    Turnaround time for your insurance to make a decision on your Prior Authorization can take 7-21 business days.

## 2024-08-23 NOTE — TELEPHONE ENCOUNTER
PA for ULORIC 40 MG  DENIED    Reason:(Screenshot if applicable)        Message sent to office clinical pool Yes    Denial letter scanned into Media Yes    Appeal started No (Provider will need to decide if appeal is warranted and send clinical documentation to Prior Authorization Team for initiation.)    **Please follow up with your patient regarding denial and next steps**

## 2024-08-27 ENCOUNTER — TELEPHONE (OUTPATIENT)
Age: 64
End: 2024-08-27

## 2024-08-27 NOTE — TELEPHONE ENCOUNTER
LIned and labbed, pt to xray for f/u imaging PT called inquiring if there is an alternative to: lisinopril (ZESTRIL) 40 mg tablet - that he can take?    PT stated Dr. Thomas is aware that he decided to go off it and pt is reporting that he has been recording his BPs, and sees that his BP is going up again. Reason for asking for alternative to lisonpril.     Script going to the following pharmacy:  [facility]/pharmacy #[id number] - [place], [place] - [address] [phone]     Please advise & call pt with update on his request. Thank you!    Maurice Crawford   [phone]

## 2024-08-27 NOTE — TELEPHONE ENCOUNTER
PA for  ULORIC 40 MG  APPEALED via     []CMM  []SS  [x]Letter sent to insurance via fax [phone]  []Other site or means     All necessary records sent. Will await response from insurance company    Turnaround time for a decision to be made on an appeal could take up to 30 business days

## 2024-08-28 DIAGNOSIS — I10 ESSENTIAL HYPERTENSION: Primary | ICD-10-CM

## 2024-08-28 RX ORDER — AMLODIPINE BESYLATE 10 MG/1
10 TABLET ORAL DAILY
Qty: 30 TABLET | Refills: 5 | Status: SHIPPED | OUTPATIENT
Start: 2024-08-28

## 2024-08-28 NOTE — TELEPHONE ENCOUNTER
Will call in a prescription for amlodipine which she should take 1 pill every morning.  Continue to monitor blood pressures and call in 2 to 3 weeks with readings

## 2024-08-28 NOTE — TELEPHONE ENCOUNTER
Called patient and he stopped lisinopril due to the cough and the cough stopped. No new BP med started.  Did not write BP readings down but they are running 140's-130's and diastolic running 85-92-96.

## 2024-08-28 NOTE — TELEPHONE ENCOUNTER
I cannot find in any notes to stop the lisinopril and if substitute medication was given at that time.  I can only assume the lisinopril was stopped assuming it may be causing his cough.  Please let me know who stopped the lisinopril; when it was stopped; if something else was given for his blood pressure; and if his cough is better being off the medication.  Also need to know what his blood pressures are doing at present

## 2024-08-29 ENCOUNTER — OFFICE VISIT (OUTPATIENT)
Dept: PODIATRY | Facility: CLINIC | Age: 64
End: 2024-08-29
Payer: COMMERCIAL

## 2024-08-29 VITALS
WEIGHT: 315 LBS | HEART RATE: 84 BPM | OXYGEN SATURATION: 96 % | TEMPERATURE: 98.2 F | DIASTOLIC BLOOD PRESSURE: 79 MMHG | SYSTOLIC BLOOD PRESSURE: 153 MMHG | HEIGHT: 73 IN | BODY MASS INDEX: 41.75 KG/M2

## 2024-08-29 DIAGNOSIS — B35.3 TINEA PEDIS OF BOTH FEET: ICD-10-CM

## 2024-08-29 DIAGNOSIS — M79.672 PAIN IN BOTH FEET: ICD-10-CM

## 2024-08-29 DIAGNOSIS — M79.671 PAIN IN BOTH FEET: ICD-10-CM

## 2024-08-29 DIAGNOSIS — L60.0 INGROWN TOENAIL: Primary | ICD-10-CM

## 2024-08-29 DIAGNOSIS — B35.1 ONYCHOMYCOSIS: ICD-10-CM

## 2024-08-29 PROCEDURE — 99203 OFFICE O/P NEW LOW 30 MIN: CPT | Performed by: PODIATRIST

## 2024-08-29 RX ORDER — CICLOPIROX 80 MG/ML
SOLUTION TOPICAL
Qty: 6.6 ML | Refills: 3 | Status: SHIPPED | OUTPATIENT
Start: 2024-08-29

## 2024-08-29 RX ORDER — KETOCONAZOLE 20 MG/G
CREAM TOPICAL DAILY
Qty: 60 G | Refills: 2 | Status: SHIPPED | OUTPATIENT
Start: 2024-08-29

## 2024-08-29 NOTE — PROGRESS NOTES
"Ambulatory Visit  Name: Maurice Crawford      : 1960      MRN: [id number]  Encounter Provider: Eric Paul DPM  Encounter Date: 2024   Encounter department: [facility] PODIATRY [place]    Assessment & Plan   1. Ingrown toenail  2. Pain in both feet  -     Ambulatory Referral to Podiatry  3. Onychomycosis  - he has no trophic findings.  He does not need at risk footcare  - I think that he is having some hypersensitivity on his feet that is due to his back  - I advised to wear shoes  - Rx given for Penlac and ketoconazole and advised on use and fungal recurrence etc.    History of Present Illness     Maurice Crawford is a 64 y.o. male who presents for evaluation and management of his bilateral feet. Complaining of incurvated and painful toenails. He has difficulty bending down and cutting them himself.  History of back problems back compression and also hypersensitivity bilateral feet.  Complaining of stepping on a pebble and having severe pain.  Has been soaking his toenails and vinegar has not tried any prescriptions for his toenails.  Not diabetic    Review of Systems   Constitutional:  Negative for chills and fever.   HENT:  Negative for ear pain and sore throat.    Eyes:  Negative for pain and visual disturbance.   Respiratory:  Negative for cough and shortness of breath.    Cardiovascular:  Negative for chest pain and palpitations.   Gastrointestinal:  Negative for abdominal pain and vomiting.   Genitourinary:  Negative for dysuria and hematuria.   Musculoskeletal:  Negative for arthralgias and back pain.   Skin:  Negative for color change and rash.   Neurological:  Negative for seizures and syncope.   All other systems reviewed and are negative.      Objective     /79 (BP Location: Left arm, Patient Position: Sitting, Cuff Size: Large)   Pulse 84   Temp 98.2 °F (36.8 °C) (Temporal)   Ht 6' 1\" (1.854 m)   Wt (!) 149 kg (328 lb)   SpO2 96%   BMI 43.27 kg/m²     Physical " Exam  Vitals reviewed.   Constitutional:       Appearance: Normal appearance.   HENT:      Head: Normocephalic and atraumatic.      Nose: Nose normal.      Mouth/Throat:      Mouth: Mucous membranes are moist.   Eyes:      Pupils: Pupils are equal, round, and reactive to light.   Pulmonary:      Effort: Pulmonary effort is normal.   Skin:     Comments: Nails 1, 5 bilaterally have some component of yellowed color change along the borders and also some slight lifting and incurvation with some increased thickening.  There is positive varicosities no retained fluid pulses are very palpable plus 2 out of 4 bilaterally he does have present digital hair growth no atrophy of skin range of motion intact   Neurological:      General: No focal deficit present.      Mental Status: He is alert and oriented to person, place, and time. Mental status is at baseline.       Administrative Statements

## 2024-09-04 ENCOUNTER — TELEPHONE (OUTPATIENT)
Dept: FAMILY MEDICINE CLINIC | Facility: CLINIC | Age: 64
End: 2024-09-04

## 2024-09-04 NOTE — TELEPHONE ENCOUNTER
Message for patient to return call trying to do an appeal for uloric dr harrell needs to know what else is on the formulary we no he tried allopurinol but must have tried 3 so need to know what else is covered

## 2024-09-18 ENCOUNTER — TELEPHONE (OUTPATIENT)
Dept: FAMILY MEDICINE CLINIC | Facility: CLINIC | Age: 64
End: 2024-09-18

## 2024-09-18 NOTE — TELEPHONE ENCOUNTER
Called patient and left message to call the office in regards to his BP readings Dr Thomas reviewed

## 2024-09-18 NOTE — TELEPHONE ENCOUNTER
----- Message from Shasta Thomas MD sent at 9/18/2024 10:23 AM EDT -----  Systolic readings still high   Cont amlodipine and would add another med   Cont low salt diet and monitoring and again send me reports after new med started  What pharmacy?    used

## 2024-09-20 DIAGNOSIS — I10 ESSENTIAL HYPERTENSION: Primary | ICD-10-CM

## 2024-09-20 RX ORDER — IRBESARTAN 150 MG/1
150 TABLET ORAL DAILY
Qty: 30 TABLET | Refills: 3 | Status: SHIPPED | OUTPATIENT
Start: 2024-09-20

## 2024-10-28 DIAGNOSIS — M1A.00X0 IDIOPATHIC CHRONIC GOUT WITHOUT TOPHUS, UNSPECIFIED SITE: ICD-10-CM

## 2024-10-29 RX ORDER — COLCHICINE 0.6 MG/1
0.6 TABLET ORAL DAILY
Qty: 30 TABLET | Refills: 0 | Status: SHIPPED | OUTPATIENT
Start: 2024-10-29

## 2024-11-12 ENCOUNTER — OFFICE VISIT (OUTPATIENT)
Dept: FAMILY MEDICINE CLINIC | Facility: CLINIC | Age: 64
End: 2024-11-12
Payer: COMMERCIAL

## 2024-11-12 VITALS
HEART RATE: 88 BPM | DIASTOLIC BLOOD PRESSURE: 68 MMHG | OXYGEN SATURATION: 97 % | BODY MASS INDEX: 41.75 KG/M2 | SYSTOLIC BLOOD PRESSURE: 138 MMHG | WEIGHT: 315 LBS | HEIGHT: 73 IN

## 2024-11-12 DIAGNOSIS — M25.511 ACUTE PAIN OF BOTH SHOULDERS: ICD-10-CM

## 2024-11-12 DIAGNOSIS — M54.2 CERVICALGIA: ICD-10-CM

## 2024-11-12 DIAGNOSIS — R60.0 LOCALIZED EDEMA: ICD-10-CM

## 2024-11-12 DIAGNOSIS — R07.89 OTHER CHEST PAIN: Primary | ICD-10-CM

## 2024-11-12 DIAGNOSIS — M25.512 ACUTE PAIN OF BOTH SHOULDERS: ICD-10-CM

## 2024-11-12 DIAGNOSIS — Z23 ENCOUNTER FOR IMMUNIZATION: ICD-10-CM

## 2024-11-12 PROCEDURE — 99215 OFFICE O/P EST HI 40 MIN: CPT | Performed by: NURSE PRACTITIONER

## 2024-11-12 PROCEDURE — 90673 RIV3 VACCINE NO PRESERV IM: CPT | Performed by: NURSE PRACTITIONER

## 2024-11-12 PROCEDURE — 90471 IMMUNIZATION ADMIN: CPT | Performed by: NURSE PRACTITIONER

## 2024-11-12 PROCEDURE — 93000 ELECTROCARDIOGRAM COMPLETE: CPT | Performed by: NURSE PRACTITIONER

## 2024-11-12 NOTE — PROGRESS NOTES
Ambulatory Visit  Name: Maurice Crawford      : 1960      MRN: [id number]  Encounter Provider: ZBIGNIEW Burt  Encounter Date: 2024   Encounter department: [facility] PRIMARY CARE    Assessment & Plan  Other chest pain    Orders:    POCT ECG    Echo stress test, dobutamine; Future    Comprehensive metabolic panel; Future  Proceed to ER for unrelenting symptoms.  Localized edema  With 9 pound weight gain.  Suspect it may be side effect from amlodipine but I have not DC'd it yet  Orders:    B-Type Natriuretic Peptide(BNP); Future    Cervicalgia    Orders:    XR spine cervical complete 4 or 5 vw non injury; Future    Acute pain of both shoulders  Think he has some referred pain to both his shoulders but I do also believe he probably has a tendinitis of the left shoulder as well  Recheck after cardiac studies  Orders:    XR shoulder 2+ vw right; Future    XR shoulder 2+ vw left; Future    Encounter for immunization    Orders:    influenza vaccine, recombinant, PF, 0.5 mL IM (Flublok)       History of Present Illness     Patient originally scheduled for blood pressure.  He does mention that he has had several episodes of chest pain.  Pressure in his substernal area.  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  He had it at home and it radiated up to his the left side of his neck.  He states he had a normal stress test about 10 years ago I do not have it in the chart to review.  He is not having symptoms at this minute.  He is also noted he has had some increased swelling in his legs.  The podiatrist pointed it out to him.  He is not sure if it is new or it has been there a while.  It certainly been there a few months.  Mild dyspnea on exertion.  We do note he started amlodipine about 5 or 6 months ago from what he states.    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  No injury to neck.        History obtained from : patient  Review of Systems   Respiratory:  Positive for chest tightness and shortness of breath (Mild transient).    Cardiovascular:  Positive for chest pain and leg swelling.   Musculoskeletal:  Positive for arthralgias and neck pain.   Neurological:  Negative for dizziness.           Objective     /68 (BP Location: Left arm, Patient Position: Sitting, Cuff Size: Extra-Large)   Pulse 88   Ht 6' 1\" (1.854 m)   Wt (!) 151 kg (333 lb)   SpO2 97%   BMI 43.93 kg/m²     Physical Exam  Cardiovascular:      Rate and Rhythm: Normal rate and regular rhythm.      Comments: Nonpitting edema of lower extremities  9 pound weight gain in the last 4 months  Pulmonary:      Effort: Pulmonary effort is normal.      Breath sounds: Normal breath sounds.   Musculoskeletal:      Comments: Mild tenderness mid to lower cervical spine.  Mild discomfort with Palpation of left AC joint.  Sometimes he has discomfort when he abducts and abducts his left arm sometimes he does not.       Administrative Statements   I have spent a total time of 40 minutes in caring for this patient on the day of the visit/encounter including Diagnostic results, Prognosis, Instructions for management, Impressions, Counseling / Coordination of care, Documenting in the medical record, Reviewing / ordering tests, medicine, procedures  , Obtaining or reviewing history  , and Communicating with other healthcare professionals .  "

## 2024-11-15 ENCOUNTER — APPOINTMENT (OUTPATIENT)
Dept: RADIOLOGY | Facility: CLINIC | Age: 64
End: 2024-11-15
Payer: COMMERCIAL

## 2024-11-15 ENCOUNTER — TELEPHONE (OUTPATIENT)
Dept: FAMILY MEDICINE CLINIC | Facility: CLINIC | Age: 64
End: 2024-11-15

## 2024-11-15 ENCOUNTER — APPOINTMENT (OUTPATIENT)
Age: 64
End: 2024-11-15
Payer: COMMERCIAL

## 2024-11-15 ENCOUNTER — CLINICAL SUPPORT (OUTPATIENT)
Dept: FAMILY MEDICINE CLINIC | Facility: CLINIC | Age: 64
End: 2024-11-15
Payer: COMMERCIAL

## 2024-11-15 VITALS — SYSTOLIC BLOOD PRESSURE: 130 MMHG | DIASTOLIC BLOOD PRESSURE: 72 MMHG

## 2024-11-15 DIAGNOSIS — Z13.220 SCREENING CHOLESTEROL LEVEL: ICD-10-CM

## 2024-11-15 DIAGNOSIS — M25.512 ACUTE PAIN OF BOTH SHOULDERS: ICD-10-CM

## 2024-11-15 DIAGNOSIS — R63.5 WEIGHT GAIN: ICD-10-CM

## 2024-11-15 DIAGNOSIS — R73.09 ELEVATED GLUCOSE: ICD-10-CM

## 2024-11-15 DIAGNOSIS — M25.511 ACUTE PAIN OF BOTH SHOULDERS: ICD-10-CM

## 2024-11-15 DIAGNOSIS — R60.0 LOCALIZED EDEMA: ICD-10-CM

## 2024-11-15 DIAGNOSIS — M54.2 CERVICALGIA: ICD-10-CM

## 2024-11-15 DIAGNOSIS — R07.89 OTHER CHEST PAIN: ICD-10-CM

## 2024-11-15 DIAGNOSIS — Z12.5 SCREENING FOR MALIGNANT NEOPLASM OF PROSTATE: ICD-10-CM

## 2024-11-15 DIAGNOSIS — R10.32 LEFT LOWER QUADRANT ABDOMINAL PAIN: ICD-10-CM

## 2024-11-15 DIAGNOSIS — I10 ESSENTIAL HYPERTENSION: Primary | ICD-10-CM

## 2024-11-15 LAB
ALBUMIN SERPL BCG-MCNC: 4.2 G/DL (ref 3.5–5)
ALP SERPL-CCNC: 64 U/L (ref 34–104)
ALT SERPL W P-5'-P-CCNC: 41 U/L (ref 7–52)
ANION GAP SERPL CALCULATED.3IONS-SCNC: 10 MMOL/L (ref 4–13)
AST SERPL W P-5'-P-CCNC: 26 U/L (ref 13–39)
BASOPHILS # BLD AUTO: 0.04 THOUSANDS/ÂΜL (ref 0–0.1)
BASOPHILS NFR BLD AUTO: 1 % (ref 0–1)
BILIRUB SERPL-MCNC: 0.49 MG/DL (ref 0.2–1)
BNP SERPL-MCNC: 17 PG/ML (ref 0–100)
BUN SERPL-MCNC: 14 MG/DL (ref 5–25)
CALCIUM SERPL-MCNC: 9 MG/DL (ref 8.4–10.2)
CHLORIDE SERPL-SCNC: 104 MMOL/L (ref 96–108)
CHOLEST SERPL-MCNC: 157 MG/DL (ref ?–200)
CO2 SERPL-SCNC: 26 MMOL/L (ref 21–32)
CREAT SERPL-MCNC: 0.85 MG/DL (ref 0.6–1.3)
EOSINOPHIL # BLD AUTO: 0.32 THOUSAND/ÂΜL (ref 0–0.61)
EOSINOPHIL NFR BLD AUTO: 5 % (ref 0–6)
ERYTHROCYTE [DISTWIDTH] IN BLOOD BY AUTOMATED COUNT: 12.7 % (ref 11.6–15.1)
EST. AVERAGE GLUCOSE BLD GHB EST-MCNC: 120 MG/DL
GFR SERPL CREATININE-BSD FRML MDRD: 92 ML/MIN/1.73SQ M
GLUCOSE P FAST SERPL-MCNC: 94 MG/DL (ref 65–99)
HBA1C MFR BLD: 5.8 %
HCT VFR BLD AUTO: 41.6 % (ref 36.5–49.3)
HDLC SERPL-MCNC: 36 MG/DL
HGB BLD-MCNC: 14.2 G/DL (ref 12–17)
IMM GRANULOCYTES # BLD AUTO: 0.02 THOUSAND/UL (ref 0–0.2)
IMM GRANULOCYTES NFR BLD AUTO: 0 % (ref 0–2)
LDLC SERPL CALC-MCNC: 99 MG/DL (ref 0–100)
LYMPHOCYTES # BLD AUTO: 2.16 THOUSANDS/ÂΜL (ref 0.6–4.47)
LYMPHOCYTES NFR BLD AUTO: 33 % (ref 14–44)
MCH RBC QN AUTO: 30.5 PG (ref 26.8–34.3)
MCHC RBC AUTO-ENTMCNC: 34.1 G/DL (ref 31.4–37.4)
MCV RBC AUTO: 89 FL (ref 82–98)
MONOCYTES # BLD AUTO: 0.83 THOUSAND/ÂΜL (ref 0.17–1.22)
MONOCYTES NFR BLD AUTO: 13 % (ref 4–12)
NEUTROPHILS # BLD AUTO: 3.22 THOUSANDS/ÂΜL (ref 1.85–7.62)
NEUTS SEG NFR BLD AUTO: 48 % (ref 43–75)
NRBC BLD AUTO-RTO: 0 /100 WBCS
PLATELET # BLD AUTO: 224 THOUSANDS/UL (ref 149–390)
PMV BLD AUTO: 10.3 FL (ref 8.9–12.7)
POTASSIUM SERPL-SCNC: 4.1 MMOL/L (ref 3.5–5.3)
PROT SERPL-MCNC: 6.7 G/DL (ref 6.4–8.4)
PSA SERPL-MCNC: 0.56 NG/ML (ref 0–4)
RBC # BLD AUTO: 4.66 MILLION/UL (ref 3.88–5.62)
SODIUM SERPL-SCNC: 140 MMOL/L (ref 135–147)
TRIGL SERPL-MCNC: 111 MG/DL (ref ?–150)
TSH SERPL DL<=0.05 MIU/L-ACNC: 2.14 UIU/ML (ref 0.45–4.5)
WBC # BLD AUTO: 6.59 THOUSAND/UL (ref 4.31–10.16)

## 2024-11-15 PROCEDURE — G0103 PSA SCREENING: HCPCS

## 2024-11-15 PROCEDURE — 83036 HEMOGLOBIN GLYCOSYLATED A1C: CPT

## 2024-11-15 PROCEDURE — 80061 LIPID PANEL: CPT

## 2024-11-15 PROCEDURE — 36415 COLL VENOUS BLD VENIPUNCTURE: CPT

## 2024-11-15 PROCEDURE — 84443 ASSAY THYROID STIM HORMONE: CPT

## 2024-11-15 PROCEDURE — 85025 COMPLETE CBC W/AUTO DIFF WBC: CPT

## 2024-11-15 PROCEDURE — 80053 COMPREHEN METABOLIC PANEL: CPT

## 2024-11-15 PROCEDURE — 73030 X-RAY EXAM OF SHOULDER: CPT

## 2024-11-15 PROCEDURE — 83880 ASSAY OF NATRIURETIC PEPTIDE: CPT

## 2024-11-15 PROCEDURE — 99211 OFF/OP EST MAY X REQ PHY/QHP: CPT | Performed by: FAMILY MEDICINE

## 2024-11-15 PROCEDURE — 72050 X-RAY EXAM NECK SPINE 4/5VWS: CPT

## 2024-11-15 NOTE — PROGRESS NOTES
Pt came in for bp check. I got 130/72, he got 134/76 on his machine. He faxed his report to us. Placed on Dr Livingston desk.

## 2024-11-18 ENCOUNTER — RESULTS FOLLOW-UP (OUTPATIENT)
Dept: FAMILY MEDICINE CLINIC | Facility: CLINIC | Age: 64
End: 2024-11-18

## 2024-11-18 DIAGNOSIS — M10.9 GOUT, UNSPECIFIED CAUSE, UNSPECIFIED CHRONICITY, UNSPECIFIED SITE: ICD-10-CM

## 2024-11-18 DIAGNOSIS — I10 ESSENTIAL HYPERTENSION: Primary | ICD-10-CM

## 2024-11-18 RX ORDER — IRBESARTAN 300 MG/1
300 TABLET ORAL DAILY
Qty: 100 TABLET | Refills: 3 | Status: SHIPPED | OUTPATIENT
Start: 2024-11-18

## 2024-11-18 RX ORDER — INDOMETHACIN 50 MG/1
50 CAPSULE ORAL
Qty: 30 CAPSULE | Refills: 1 | Status: SHIPPED | OUTPATIENT
Start: 2024-11-18

## 2024-11-18 NOTE — RESULT ENCOUNTER NOTE
Notify patient that his blood work was good recently.  Cholesterol is good everything is normal except his sugar is just a little bit worse than it was although it still not terrible.  It does not gym into the prediabetic range so try and watch diet and exercise and increase exercise.  Decrease carbs decrease sugars.  Increase protein

## 2024-11-18 NOTE — RESULT ENCOUNTER NOTE
Notify patient that x-ray of shoulders shows mild arthritis on both sides the x-ray of his neck shows extensive arthritis down his cervical spine.  Did he schedule stress test we had said he would schedule a recheck here after he had the stress test scheduled

## 2024-12-03 DIAGNOSIS — R07.89 OTHER CHEST PAIN: Primary | ICD-10-CM

## 2024-12-04 ENCOUNTER — TELEPHONE (OUTPATIENT)
Age: 64
End: 2024-12-04

## 2024-12-04 NOTE — TELEPHONE ENCOUNTER
Pt called and stated that his Job (as a ) is giving him a hard time about taking 2 days off in a row, as he has an appt for a stress test on Dec 11 at 9am and works overnights so he took off Dec 10 and 11. His office is sending over [facility] paperwork to cover him for these dates at this time. If there are any questions please call number provided and he did state that is spouse would more than likely answer the phone and would be able to answer most questions.

## 2024-12-10 ENCOUNTER — HOSPITAL ENCOUNTER (OUTPATIENT)
Dept: NON INVASIVE DIAGNOSTICS | Facility: HOSPITAL | Age: 64
Discharge: HOME | End: 2024-12-10

## 2024-12-11 ENCOUNTER — HOSPITAL ENCOUNTER (OUTPATIENT)
Dept: NUCLEAR MEDICINE | Facility: HOSPITAL | Age: 64
Discharge: HOME | End: 2024-12-11
Payer: COMMERCIAL

## 2024-12-11 ENCOUNTER — RESULTS FOLLOW-UP (OUTPATIENT)
Dept: FAMILY MEDICINE CLINIC | Facility: CLINIC | Age: 64
End: 2024-12-11

## 2024-12-11 DIAGNOSIS — R07.89 OTHER CHEST PAIN: ICD-10-CM

## 2024-12-11 LAB
CHEST PAIN STATEMENT: NORMAL
MAX DIASTOLIC BP: 68 MMHG
MAX HR PERCENT: 60 %
MAX HR: 95 BPM
MAX PREDICTED HEART RATE: 156 BPM
NUC STRESS EJECTION FRACTION: 70 %
PROTOCOL NAME: NORMAL
RATE PRESSURE PRODUCT: NORMAL
REASON FOR TERMINATION: NORMAL
SL CV REST NUCLEAR ISOTOPE DOSE: 16.2 MCI
SL CV STRESS NUCLEAR ISOTOPE DOSE: 47.9 MCI
STRESS ANGINA INDEX: 0
STRESS BASELINE HR: 65 BPM
STRESS PEAK HR: 95 BPM
STRESS POST EXERCISE DUR MIN: 3 MIN
STRESS POST EXERCISE DUR SEC: 0 SEC
STRESS POST PEAK BP: 138 MMHG
STRESS POST PEAK HR: 95 BPM
STRESS POST PEAK SYSTOLIC BP: 128 MMHG
STRESS/REST PERFUSION RATIO: 1.17
TARGET HR FORMULA: NORMAL
TEST INDICATION: NORMAL

## 2024-12-11 PROCEDURE — 93016 CV STRESS TEST SUPVJ ONLY: CPT | Performed by: INTERNAL MEDICINE

## 2024-12-11 PROCEDURE — 78452 HT MUSCLE IMAGE SPECT MULT: CPT

## 2024-12-11 PROCEDURE — A9502 TC99M TETROFOSMIN: HCPCS

## 2024-12-11 PROCEDURE — 93018 CV STRESS TEST I&R ONLY: CPT | Performed by: INTERNAL MEDICINE

## 2024-12-11 PROCEDURE — 78452 HT MUSCLE IMAGE SPECT MULT: CPT | Performed by: INTERNAL MEDICINE

## 2024-12-11 PROCEDURE — 93017 CV STRESS TEST TRACING ONLY: CPT

## 2024-12-11 RX ORDER — REGADENOSON 0.08 MG/ML
0.4 INJECTION, SOLUTION INTRAVENOUS ONCE
Status: COMPLETED | OUTPATIENT
Start: 2024-12-11 | End: 2024-12-11

## 2024-12-11 RX ADMIN — REGADENOSON 0.4 MG: 0.08 INJECTION, SOLUTION INTRAVENOUS at 10:28

## 2024-12-13 NOTE — TELEPHONE ENCOUNTER
Natalia, [employer], reports Maurice has requested FMLA. She will fax the forms to PCP office for completion.

## 2024-12-16 ENCOUNTER — OFFICE VISIT (OUTPATIENT)
Dept: FAMILY MEDICINE CLINIC | Facility: CLINIC | Age: 64
End: 2024-12-16
Payer: COMMERCIAL

## 2024-12-16 VITALS
HEIGHT: 73 IN | BODY MASS INDEX: 0.79 KG/M2 | OXYGEN SATURATION: 97 % | WEIGHT: 6 LBS | DIASTOLIC BLOOD PRESSURE: 72 MMHG | HEART RATE: 96 BPM | SYSTOLIC BLOOD PRESSURE: 142 MMHG | TEMPERATURE: 96.9 F

## 2024-12-16 DIAGNOSIS — M54.2 CERVICALGIA: Primary | ICD-10-CM

## 2024-12-16 DIAGNOSIS — R10.13 DYSPEPSIA: ICD-10-CM

## 2024-12-16 DIAGNOSIS — M47.22 CERVICAL RADICULOPATHY DUE TO DEGENERATIVE JOINT DISEASE OF SPINE: ICD-10-CM

## 2024-12-16 DIAGNOSIS — R60.0 LOCALIZED EDEMA: ICD-10-CM

## 2024-12-16 PROCEDURE — 96372 THER/PROPH/DIAG INJ SC/IM: CPT | Performed by: NURSE PRACTITIONER

## 2024-12-16 PROCEDURE — 99214 OFFICE O/P EST MOD 30 MIN: CPT | Performed by: NURSE PRACTITIONER

## 2024-12-16 RX ORDER — CELECOXIB 200 MG/1
200 CAPSULE ORAL 2 TIMES DAILY
Qty: 60 CAPSULE | Refills: 1 | Status: SHIPPED | OUTPATIENT
Start: 2024-12-16

## 2024-12-16 RX ORDER — METHOCARBAMOL 750 MG/1
750 TABLET, FILM COATED ORAL EVERY 6 HOURS PRN
Qty: 30 TABLET | Refills: 1 | Status: SHIPPED | OUTPATIENT
Start: 2024-12-16

## 2024-12-16 RX ORDER — KETOROLAC TROMETHAMINE 30 MG/ML
60 INJECTION, SOLUTION INTRAMUSCULAR; INTRAVENOUS ONCE
Status: COMPLETED | OUTPATIENT
Start: 2024-12-16 | End: 2024-12-16

## 2024-12-16 RX ADMIN — KETOROLAC TROMETHAMINE 60 MG: 30 INJECTION, SOLUTION INTRAMUSCULAR; INTRAVENOUS at 09:49

## 2024-12-16 NOTE — PROGRESS NOTES
Name: Maurice Crawford      : 1960      MRN: [id number]  Encounter Provider: ZBIGNIEW Burt  Encounter Date: 2024   Encounter department: [facility]  :  Assessment & Plan  Cervicalgia    Orders:  •  Ambulatory Referral to Comprehensive Spine PT; Future  •  celecoxib (CeleBREX) 200 mg capsule; Take 1 capsule (200 mg total) by mouth 2 (two) times a day  •  methocarbamol (Robaxin-750) 750 mg tablet; Take 1 tablet (750 mg total) by mouth every 6 (six) hours as needed for muscle spasms  •  ketorolac (TORADOL) 60 mg/2 mL IM injection 60 mg  If unimproved will refer to Ortho.  Caution with Celebrex I really think he should only do it for about 5 days in a row and then after that only if needed as it may make his GI symptoms worse.  Cervical radiculopathy due to degenerative joint disease of spine    Orders:  •  Ambulatory Referral to Comprehensive Spine PT; Future  •  celecoxib (CeleBREX) 200 mg capsule; Take 1 capsule (200 mg total) by mouth 2 (two) times a day  •  methocarbamol (Robaxin-750) 750 mg tablet; Take 1 tablet (750 mg total) by mouth every 6 (six) hours as needed for muscle spasms  •  ketorolac (TORADOL) 60 mg/2 mL IM injection 60 mg    Dyspepsia    Orders:  •  Ambulatory Referral to Gastroenterology; Future  •  omeprazole (PriLOSEC) 20 mg delayed release capsule; Take 1 capsule (20 mg total) by mouth daily before breakfast    Localized edema  Patient is improved.  Will monitor as I did explain he is going to have a tendency towards edema being on amlodipine.  Also warned that NSAID use  may cause that as well              History of Present Illness     Follow-up visit for multiple problems.  He did have a chest pain when I last saw him and the stress test was good.  He also had complaints of neck and shoulder pain.  Also had some edema that we suspected may be from the amlodipine.  He now reports that the and swelling has improved in his legs.  His main concern today  "is the neck and shoulder pain that is been ongoing.  He has been working with it but it does interfere with his activities.  He recalls history of right shoulder surgery.  Also history of lumbar disc disease for which he sought OA a in 2019.  He had a hospital admission at that time for back pain.  Apparently he has an unusual side effect to cortisone where he becomes almost unresponsive mentally.  He could not remember if he ever had oral steroids.  I do see where they were prescribed once in 2019 but I do cannot get the rest of notes to see if he ever actually took them.  He is interested in Toradol recalls being treated with Toradol at that time.  Unfortunately he also has some dyspepsia complaints may have been the source of the chest pain that I saw him for last month.  He does get some epigastric pain from time to time.  He also had an anorectal condition that he saw Dr. Lisa for and he would like a new GI person.  Apparently he was applying some type of compounded cream that he used to get at a [facility] pharmacy.      Review of Systems    Objective   /72 (BP Location: Left arm, Patient Position: Sitting, Cuff Size: Large)   Pulse 96   Temp (!) 96.9 °F (36.1 °C) (Tympanic)   Ht 6' 1\" (1.854 m)   Wt 2.722 kg (6 lb)   SpO2 97%   BMI 0.79 kg/m²      Physical Exam  Constitutional:       Appearance: He is not ill-appearing.   Pulmonary:      Effort: Pulmonary effort is normal.      Breath sounds: Normal breath sounds.   Musculoskeletal:      Cervical back: Tenderness present.      Comments: Mild discomfort with range of motion of neck in flexion and extension  Mild discomfort with range of motion of shoulders.   Neurological:      Mental Status: He is alert.   Psychiatric:         Mood and Affect: Mood normal.         "

## 2024-12-17 ENCOUNTER — TELEPHONE (OUTPATIENT)
Age: 64
End: 2024-12-17

## 2024-12-17 NOTE — TELEPHONE ENCOUNTER
Called patient regarding 2 forms from [facility] for Short term disability.  He said that he doesn't need them and we should get rid of them.

## 2024-12-17 NOTE — TELEPHONE ENCOUNTER
Laith from [employer] called requesting dates of service for this patient.  I told him that I cannot provide information and asked him to please fax a request for information to us.  He claims that he already faxed a request but I do not see a request anywhere in the patient's chart.  I asked him to refax it - he thanked me for taking is call and he hung up.

## 2024-12-19 DIAGNOSIS — M1A.00X0 IDIOPATHIC CHRONIC GOUT WITHOUT TOPHUS, UNSPECIFIED SITE: ICD-10-CM

## 2024-12-19 RX ORDER — COLCHICINE 0.6 MG/1
0.6 TABLET ORAL DAILY
Qty: 30 TABLET | Refills: 5 | Status: SHIPPED | OUTPATIENT
Start: 2024-12-19

## 2025-01-06 ENCOUNTER — EVALUATION (OUTPATIENT)
Dept: PHYSICAL THERAPY | Facility: CLINIC | Age: 65
End: 2025-01-06
Payer: COMMERCIAL

## 2025-01-06 DIAGNOSIS — M54.2 CHRONIC NECK PAIN: Primary | ICD-10-CM

## 2025-01-06 DIAGNOSIS — M47.22 CERVICAL RADICULOPATHY DUE TO DEGENERATIVE JOINT DISEASE OF SPINE: ICD-10-CM

## 2025-01-06 DIAGNOSIS — G89.29 CHRONIC NECK PAIN: Primary | ICD-10-CM

## 2025-01-06 DIAGNOSIS — M54.2 CERVICALGIA: ICD-10-CM

## 2025-01-06 PROCEDURE — 97110 THERAPEUTIC EXERCISES: CPT | Performed by: PHYSICAL MEDICINE & REHABILITATION

## 2025-01-06 PROCEDURE — 97161 PT EVAL LOW COMPLEX 20 MIN: CPT | Performed by: PHYSICAL MEDICINE & REHABILITATION

## 2025-01-06 NOTE — PROGRESS NOTES
PT Evaluation     Today's date: 2025  Patient name: Maurice Crawford  : 1960  MRN: [id number]  Referring provider: Tasha Short CRNP  Dx:   Encounter Diagnosis     ICD-10-CM    1. Chronic neck pain  M54.2     G89.29       2. Cervicalgia  M54.2 Ambulatory Referral to Comprehensive Spine PT      3. Cervical radiculopathy due to degenerative joint disease of spine  M47.22 Ambulatory Referral to Comprehensive Spine PT                     Assessment  Impairments: abnormal muscle firing, abnormal or restricted ROM, abnormal movement, activity intolerance, impaired physical strength, lacks appropriate home exercise program, pain with function, poor posture , participation limitations and activity limitations  Symptom irritability: moderate    Assessment details: Maurice Crawford is a 64 y.o. male presenting to outpatient physical therapy with noted impairments including neck/shoulder pain, impaired soft tissue mobility c-spine/t-spine and B shoulder girdles, reduced range of motion, reduced global strength and mm endurance, reduced postural awareness, and reduced activity tolerance. Signs and symptoms at present are consistent with referring diagnosis of chronic neck/shoulder pain with signs/sx of possible bicipital tendonitis vs. Subacromial pain syndrome B shoulders. Due to noted impairments, the patient's present functional limitations include difficulty with ADLs with increased need for assistance, reliance on medication and/or modalities for pain relief, reduced tolerance for functional mobility and activity, and difficulty completing home and self care responsibilities. 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  Patient to benefit from skilled outpatient physical therapy 1-2x/week for 6-8 weeks in  "order to reduce pain, maximize pain free range of motion, increase strength and stability, and improve functional mobility/functional activity in order to maximize return to prior level of function with reduced limitations. Home exercise program was provided and all questions answered to patient's level of satisfaction. Thank you for your referral.        Understanding of Dx/Px/POC: good     Prognosis: good    Goals  STGs to be achieved in 4-6 weeks:    1. Pt will report reduced neck/shoulder pain levels \"at worst\" by at least 2 points (0-10 scale) in order to allow improved tolerance for functional mobility and reduced reliance on medication or modalities for pain relief.   2. Pt will demonstrate improved AROM of R/L c-spine lateral flex and rotation by at least 5-10* in order to allow patient to safely turn his head while driving or crossing the street.   3. Pt will demonstrate improved deep neck flexor (DNF) strength and muscle endurance as noted by at least 10 second improvement in DNF test of endurance from SOC with proper form/tehnique without need for cues.   4. Pt will report overall improved tolerance for sustained mobility/activity by at least 25-50% since SOC with overall reduced pain levels and reduced fear avoidance.     LTGs to be achieved in 8-12 weeks:    1. Pt will be independent with HEP, demonstrating proper technique with exercises and understanding of self progression of program without need for cueing or assistance.   2. Pt will report minimized pain levels with at least 50% reduction in pain since SOC.   3. Pt will demonstrate WFL AROM and strength of B Ues without increased pain/sx.   4. Pt will demonstrate understanding of proper posture and impact of poor posture/sustained poor postures on patient pain/sx.   5. 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   6. FOTO will reflect score at discharge that is greater than or equal to predicted level.         Plan  Patient would " "benefit from: skilled physical therapy  Referral necessary: No    Planned therapy interventions: activity modification, manual therapy, motor coordination training, neuromuscular re-education, postural training, patient/caregiver education, self care, strengthening, stretching, therapeutic activities, therapeutic exercise, flexibility, functional ROM exercises, graded exercise and home exercise program    Frequency: 2x week  Therapy duration (weeks): 6-8.  Plan of Care beginning date: 1/6/2025  Plan of Care expiration date: 3/3/2025  Treatment plan discussed with: patient        Subjective Evaluation    History of Present Illness  Mechanism of injury: Pt notes hx Waxing/waning chronic neck and B shoulder pain for \"at least a year\". Hx R RTC repair and R bicep repair 2017. No known trauma/LUNA to have triggered present neck/shoulder pain per pt. Hx of chronic gout with joint aches/pains. Chronic LBP with/without sciatica B. Pt is a ; does feel he has been robert more in the past year as compared to prior years.   Notes he has been told he has arthritis in neck and shoulders per recent xrays. No additional testing to date per pt. Medications as Rx by PCP; feels the medications are helping. Notes overall he has good days /bad days. Some days he has a hard time moving his neck and/or shoulders. Some days the pain comes out of no where per pt. Notes he feels he can hear grinding in his neck/shoulders at times with AROM. Sometimes has good shoulder ROM, sometimes he can't move his shoulders/neck. Pt notes pain is worse with excessive activity, with bending too much, lifting too heavy etc; tries to avoid these things. Notes he can't stand long periods. Notes his neck /back does get sore/stiff with sitting long periods in the truck; tries to be mindful of posture. Looking down at phone long periods aggravates sx neck/shoulders. Limits overhead reaching; this can cause his shoulder to click and hurt at times. "   Pt notes c/c of pain R/L anterior shoulder. Pain base of neck B; can radiate down spine. Hx of sciatica with n/t off/on R/L LE to ankle; Pain R/L UT. Also gets LBP. Intermittent n/t R/L UE but these seems to be positional such as sleeping on his arms or driving long periods; better with changing position. Hx of injections in the L/S. No hx of tx /injections for the neck. Did have a shot of Toradol recently which helped a lot. Pt denies progressive UE/LE weakness. Does feel weakness in his arms/hands at times that comes/goes with sx; better since the Toradol per pt. Does feel he is still holding fluid in hands/feet/lower legs; comes/goes. Generally feels deconditioned; has been less active. Wants to start going to the gym in Feb. No BBI. No progressive LE weakness. No night pain. No saddle sx. No additional sx/complaints. Overall sx are unchanging.   Patient Goals  Patient goals for therapy: decreased pain, increased motion and increased strength    Pain  Current pain ratin  At best pain ratin  At worst pain ratin  Location: neck., shoulders, back  Quality: sharp, radiating, dull ache, discomfort and tight  Relieving factors: change in position and medications  Aggravating factors: standing, sitting, walking, lifting and overhead activity  Progression: no change    Social Support    Employment status: working ()  Hand dominance: right      Diagnostic Tests  X-ray: abnormal  Treatments  Previous treatment: medication and injection treatment  Current treatment: medication and physical therapy        Objective     Postural Observations  Seated posture: poor  Standing posture: fair  Correction of posture: has no consistent effect    Additional Postural Observation Details  Moderate Forward head/rounded shoulders  Increased thoracic kyphosis   Increased B scapular depression and protraction with mild winging       Tenderness     Left Shoulder   Tenderness in the biceps tendon (proximal),  "bicipital groove and subacromial bursa.     Right Shoulder  Tenderness in the biceps tendon (proximal), bicipital groove and subacromial bursa.     Neurological Testing     Sensation   Cervical/Thoracic   Left   Intact: light touch    Right   Intact: light touch    Reflexes   Left   Biceps (C5/C6): normal (2+)  Brachioradialis (C6): normal (2+)  Triceps (C7): normal (2+)  Hernandez's reflex: negative    Right   Biceps (C5/C6): normal (2+)  Brachioradialis (C6): normal (2+)  Triceps (C7): normal (2+)  Hernandez's reflex: negative    Active Range of Motion   Cervical/Thoracic Spine       Cervical  Subcranial protraction:  WFL   Subcranial retraction: Active cervical subcranial retraction: to neutral; tight.   Flexion: 50 degrees   Extension: 30 degrees      Left lateral flexion: 27 degrees      Right lateral flexion: 32 degrees      Left rotation: 72 degrees  Right rotation: 78 degrees     Left Shoulder   Flexion: 138 degrees with pain  Abduction: 146 degrees   External rotation BTH: T1   Internal rotation BTB: T10     Right Shoulder   Flexion: 150 degrees with pain  Abduction: 142 degrees   External rotation BTH: T2   Internal rotation BTB: T9     Additional Active Range of Motion Details  No concordant pain/sx with/following AROM  Notes \"grinding\" intermittently with neck AROM without pain/sx   R/L anterior shoulder pain (concordant) with R/L shoulder flexion only  No additional sx/complaints with shoulder AROM   Stiffness at end ranges  B pec tightness       Strength/Myotome Testing   Cervical Spine     Left   Interossei strength (t1): 4 and 4+    Right   Interossei strength (t1): 4 and 4+    Left Shoulder     Planes of Motion   Flexion: 4-   Abduction: 4+   External rotation at 0°: 4-   Internal rotation at 0°: 4+     Isolated Muscles   Biceps: 4+   Triceps: 4+     Right Shoulder     Planes of Motion   Flexion: 4-   Abduction: 4+   External rotation at 0°: 4-   Internal rotation at 0°: 4+     Isolated Muscles " "  Biceps: 4+   Triceps: 4+     Left Elbow   Flexion: 4+  Extension: 4+    Right Elbow   Flexion: 4+  Extension: 4+    Left Wrist/Hand   Wrist extension: 4+  Wrist flexion: 4+  Thumb extension: 4    Right Wrist/Hand   Wrist extension: 4+  Wrist flexion: 4+  Thumb extension: 4    Additional Strength Details  R/L anterior shoulder pain with MMT screening for shoulder flexion only   No other pain/sx with MMT screening       Tests   Cervical     Left   Negative Spurling's Test A.     Right   Negative Spurling's Test A.     Left Shoulder   Positive full can, Neer's and Speed's.   Negative active compression ([place]), empty can, Hawkin's and painful arc.     Additional Tests Details  Pain anterior shoulder with Speeds test, Neer test, and full can              Precautions: Hx Skin CA, gout, HTN, OA, chronic pain       Re-eval Date:  2/5    Date 1/6       Visit Count 1       FOTO 1/6       Pain In 0       Pain Out 0           Manuals 1/6/2025                                        Neuro Re-Ed        DNF training      DNF stabilization progression                                                         Ther Ex        UT stretch            Levator stretch 4-5x/20-30\" ea reviewed HEP         4-5x/20-30\" ea reviewed HEP        Pec stretch       B ER         scap depression/retraction     Neck AROM-->snags  5x 10-15\" reviewed HEP     Chin tucks  Seated 4-5x/5-10\"   Reviewed HEP                MTP/LTP      Shoulder raises flex/scap        Wall push up      Bent over rows         Pullies flex/scaption vs wall slides       Tband IR/ER        SL shoulder flex, abd, ER       Prone I, T, Ys         Bicep curls      Tricep ext         Ther Activity 5' total pt education regarding pathophysiology/pathoanatomy of present pain/sx and condition, role of PT in improving pain/sx and function, pt education regarding activity modification to avoid exacerbation of sx and delayed recovery. Increased pt e.d. regarding impact of " sustained/repeated flexed/rounded posture on spine strain/pain ; advice for ergonomic set up with computer/phone use, , ADLs etc                         Gait Training                        Modalities prn                           1/6- HEP was issued and reviewed this date for above noted exercises. Pt demonstrated understanding without incident and without questions/concerns. Will continue to update upcoming.

## 2025-01-07 ENCOUNTER — OFFICE VISIT (OUTPATIENT)
Dept: PHYSICAL THERAPY | Facility: HOME HEALTHCARE | Age: 65
End: 2025-01-07
Payer: COMMERCIAL

## 2025-01-07 DIAGNOSIS — G89.29 CHRONIC NECK PAIN: Primary | ICD-10-CM

## 2025-01-07 DIAGNOSIS — M54.2 CHRONIC NECK PAIN: Primary | ICD-10-CM

## 2025-01-07 PROCEDURE — 97110 THERAPEUTIC EXERCISES: CPT

## 2025-01-07 NOTE — PROGRESS NOTES
"Daily Note     Today's date: 2025  Patient name: Maurice Crawford  : 1960  MRN: [id number]  Referring provider: Tasha Short CRNP  Dx: No diagnosis found.    Start Time: 0700          Subjective: I have no pain right now. I had a shot of Toradol 2 weeks ago which helped.     Objective: See treatment diary below    Assessment: Pt with fair maddie to session. Pt with c/o Edelmira sh pain and tightness with most ex but able to complete as per flow sheet. Vc's provided for correct form. Advised pt to use MHP/CP as needed for pain relief. . Patient would benefit from continued PT    Plan: Continue per plan of care.      Precautions: Hx Skin CA, gout, HTN, OA, chronic pain       Re-eval Date:          Manuals 2025  1-7                                      Neuro Re-Ed        DNF training      DNF stabilization progression                                                         Ther Ex  1-7      UT stretch            Levator stretch 4-5x/20-30\" ea reviewed HEP         4-5x/20-30\" ea reviewed HEP  15\" x2             15\" x2      Pec stretch       B ER         scap depression/retraction     Neck AROM-->snags  5x 10-15\" reviewed HEP     Chin tucks  Seated 4-5x/5-10\"   Reviewed HEP                  Seated  5\" x10      MTP/LTP      Shoulder raises flex/scap  Green  1x15 ea      Cane   1x10 ea edelmira      Wall push up      Bent over rows   3\" x10      3\" x10 edelmira      Pullies flex/scaption vs wall slides       Tband IR/ER  5\" 1x10          Edelmira ER  Red 1x10       SL shoulder flex, abd, ER       Prone I, T, Ys         Bicep curls      Tricep ext   3# 1x10      Green 1x10      Ther Activity 5' total pt education regarding pathophysiology/pathoanatomy of present pain/sx and condition, role of PT in improving pain/sx and function, pt education regarding activity modification to avoid exacerbation of sx and delayed recovery. Increased pt e.d. regarding impact of sustained/repeated flexed/rounded posture on spine strain/pain ; " advice for ergonomic set up with computer/phone use, , ADLs etc                         Gait Training                        Modalities prn                           1/6- HEP was issued and reviewed this date for above noted exercises. Pt demonstrated understanding without incident and without questions/concerns. Will continue to update upcoming.

## 2025-01-09 ENCOUNTER — CONSULT (OUTPATIENT)
Age: 65
End: 2025-01-09
Payer: COMMERCIAL

## 2025-01-09 VITALS
WEIGHT: 315 LBS | HEIGHT: 73 IN | OXYGEN SATURATION: 96 % | DIASTOLIC BLOOD PRESSURE: 82 MMHG | SYSTOLIC BLOOD PRESSURE: 146 MMHG | BODY MASS INDEX: 41.75 KG/M2 | HEART RATE: 80 BPM | TEMPERATURE: 98.3 F

## 2025-01-09 DIAGNOSIS — Z86.0100 HISTORY OF COLON POLYPS: Primary | ICD-10-CM

## 2025-01-09 DIAGNOSIS — K62.89 RECTAL PAIN: ICD-10-CM

## 2025-01-09 DIAGNOSIS — R10.13 DYSPEPSIA: ICD-10-CM

## 2025-01-09 DIAGNOSIS — K21.9 GASTROESOPHAGEAL REFLUX DISEASE, UNSPECIFIED WHETHER ESOPHAGITIS PRESENT: ICD-10-CM

## 2025-01-09 DIAGNOSIS — K57.90 DIVERTICULAR DISEASE: ICD-10-CM

## 2025-01-09 DIAGNOSIS — R10.32 LEFT LOWER QUADRANT ABDOMINAL PAIN: ICD-10-CM

## 2025-01-09 PROCEDURE — 99204 OFFICE O/P NEW MOD 45 MIN: CPT | Performed by: INTERNAL MEDICINE

## 2025-01-09 RX ORDER — SODIUM, POTASSIUM,MAG SULFATES 17.5-3.13G
177 SOLUTION, RECONSTITUTED, ORAL ORAL ONCE
Qty: 177 ML | Refills: 0 | Status: SHIPPED | OUTPATIENT
Start: 2025-01-09 | End: 2025-01-09

## 2025-01-09 RX ORDER — SODIUM CHLORIDE, SODIUM LACTATE, POTASSIUM CHLORIDE, CALCIUM CHLORIDE 600; 310; 30; 20 MG/100ML; MG/100ML; MG/100ML; MG/100ML
125 INJECTION, SOLUTION INTRAVENOUS CONTINUOUS
OUTPATIENT
Start: 2025-01-09

## 2025-01-09 NOTE — PROGRESS NOTES
Name: Maurice Crawford      : 1960      MRN: [id number]  Encounter Provider: Kim Watters DO  Encounter Date: 2025   Encounter department: [facility]  :  Assessment & Plan  Dyspepsia  Several month history of dyspepsia not completely resolved with PPI while patient is on NSAIDs, high suspicion for peptic ulcer disease.  Given family history of gastric cancer though less suspicious for this we will certainly plan to proceed with EGD to evaluate etiology for pain.  Have recommended avoidance of NSAIDs if possible and to continue PPI in the meantime.  If EGD is unremarkable with then recommend proceeding with right upper quadrant ultrasound to rule out gallbladder etiology for his symptoms.  Orders:  •  Ambulatory Referral to Gastroenterology  •  US right upper quadrant; Future  •  EGD; Future    Gastroesophageal reflux disease, unspecified whether esophagitis present  Intermittent mild GERD symptoms, currently controlled since PPI was started       Diverticular disease  Patient reports intermittent left lower quadrant pain, possibly symptomatic uncomplicated diverticular disease in etiology.  He is due for colonoscopy this year for polyp surveillance and will evaluate for alternative etiology for his symptoms       History of colon polyps  3 tubular adenomas on colonoscopy by Dr. Torres in , will plan for repeat at this time of EGD.  Will also evaluate for etiology of left lower quadrant discomfort and rectal pain  Orders:  •  Colonoscopy; Future  •  Na Sulfate-K Sulfate-Mg Sulf 17.5-3.13-1.6 GM/177ML SOLN; Take 177 mL by mouth 1 (one) time for 1 dose    Rectal pain  Reports previous history of fissure that previously responded with topical treatments.  He continues to have pain with bowel movements despite not straining.  He did have 1 episode of straining before that likely caused a possible recurrence of fissure versus irritation of hemorrhoids.  Will  "evaluate for cause of rectal pain at time of colonoscopy but in the meantime reasonable to try topical nifedipine/lidocaine combination.  Printed prescription was provided so this can be taken to compounding pharmacy  Orders:  •  NIFEdipine 0.3%-lidocaine 5% rectal ointment; Apply 1 Application topically every 4 (four) hours as needed for discomfort or pain Apply a small amount to anal fissure  •  Na Sulfate-K Sulfate-Mg Sulf 17.5-3.13-1.6 GM/177ML SOLN; Take 177 mL by mouth 1 (one) time for 1 dose    Follow-up after procedures  Left lower quadrant abdominal pain             History of Present Illness   HPI  Maurice Crawford is a 64 y.o. male who presents for an evaluation of epigastric pain, left lower quadrant pain, rectal pain, history of colon polyps.    He previously had a colonoscopy in 2022 by Dr. Torres with 3 tubular adenomas.  He had also been seeing him in the past for management of anal fissure.  He was given topical treatment that he felt a [facility] nipple pharmacy a few months prior without improvement but he believes it only had lidocaine in it.    Regarding the upper abdominal pain this has been ongoing since 6 months prior along with intermittent left lower quadrant pain.  The epigastric pain does not radiate and is not associated with nausea or vomiting.  He reports a history of excessive indomethacin needs for gout pain and recently switched to Celebrex.  He was advised by his primary care provider to take omeprazole in the meantime along with this.  Denies any prior EGD.    CT abdomen pelvis with contrast from July 2024 personally reviewed by me without any significant etiologies to explain left lower quadrant pain or epigastric pain.      Review of Systems       Objective   /82 (BP Location: Right arm, Patient Position: Sitting, Cuff Size: Large)   Pulse 80   Temp 98.3 °F (36.8 °C) (Temporal)   Ht 6' 1\" (1.854 m)   Wt (!) 151 kg (332 lb)   SpO2 96%   BMI 43.80 kg/m²      Physical Exam      "

## 2025-01-09 NOTE — ASSESSMENT & PLAN NOTE
Patient reports intermittent left lower quadrant pain, possibly symptomatic uncomplicated diverticular disease in etiology.  He is due for colonoscopy this year for polyp surveillance and will evaluate for alternative etiology for his symptoms

## 2025-01-13 ENCOUNTER — OFFICE VISIT (OUTPATIENT)
Dept: PHYSICAL THERAPY | Facility: HOME HEALTHCARE | Age: 65
End: 2025-01-13
Payer: COMMERCIAL

## 2025-01-13 DIAGNOSIS — M54.2 CERVICALGIA: ICD-10-CM

## 2025-01-13 DIAGNOSIS — M47.22 CERVICAL RADICULOPATHY DUE TO DEGENERATIVE JOINT DISEASE OF SPINE: ICD-10-CM

## 2025-01-13 DIAGNOSIS — G89.29 CHRONIC NECK PAIN: Primary | ICD-10-CM

## 2025-01-13 DIAGNOSIS — M54.2 CHRONIC NECK PAIN: Primary | ICD-10-CM

## 2025-01-13 PROCEDURE — 97110 THERAPEUTIC EXERCISES: CPT

## 2025-01-13 NOTE — PROGRESS NOTES
"Daily Note     Today's date: 2025  Patient name: Maurice Crawford  : 1960  MRN: [id number]  Referring provider: Tasha Short CRNP  Dx:   Encounter Diagnosis     ICD-10-CM    1. Chronic neck pain  M54.2     G89.29       2. Cervicalgia  M54.2       3. Cervical radiculopathy due to degenerative joint disease of spine  M47.22                  Subjective: Pt reports yesterday he had 10/10 pain in his neck. Pt reports 2/10 pain in his neck today.       Objective: See treatment diary below      Assessment: Tolerated treatment fairly well. Verbal cues needed for correct form with exercises. Progressed theraband level with MTP/LTP and tricep ext today.  Pt with no c/o increased pain t/o session or at end of session. Patient would benefit from continued PT      Plan: Continue per plan of care.      Precautions: Hx Skin CA, gout, HTN, OA, chronic pain       Re-eval Date:          Manuals 2025  1-7                                      Neuro Re-Ed        DNF training      DNF stabilization progression                                                         Ther Ex  1-7      UBE   6'      UT stretch            Levator stretch 4-5x/20-30\" ea reviewed HEP         4-5x/20-30\" ea reviewed HEP  15\" x2             15\" x2 15\"x 2             15\"x 2      Pec stretch       B ER         scap depression/retraction     Neck AROM-->snags  5x 10-15\" reviewed HEP     Chin tucks  Seated 4-5x/5-10\"   Reviewed HEP                  Seated  5\" x10         Seated   5\"x 10      MTP/LTP      Shoulder raises flex/scap  Green  1x15 ea      Cane   1x10 ea micah Blue   1x20 ea       Cane   1x10 ea Micah      Wall push up      Bent over rows   3\" x10      3\" x10 micah 3\" x 10       3\" x 10 Micah      Pullies flex/scaption vs wall slides       Tband IR/ER  5\" 1x10          Miach ER  Red 1x10  5\"  1x 10 ea            Micah ER  Red 1x15      SL shoulder flex, abd, ER       Prone I, T, Ys         Bicep curls      Tricep ext   3# 1x10      Green 1x10 " 5# 1 x15       Blue 1x15     Ther Activity 5' total pt education regarding pathophysiology/pathoanatomy of present pain/sx and condition, role of PT in improving pain/sx and function, pt education regarding activity modification to avoid exacerbation of sx and delayed recovery. Increased pt e.d. regarding impact of sustained/repeated flexed/rounded posture on spine strain/pain ; advice for ergonomic set up with computer/phone use, , ADLs etc                         Gait Training                        Modalities prn                           1/6- HEP was issued and reviewed this date for above noted exercises. Pt demonstrated understanding without incident and without questions/concerns. Will continue to update upcoming.

## 2025-01-14 ENCOUNTER — OFFICE VISIT (OUTPATIENT)
Dept: PHYSICAL THERAPY | Facility: HOME HEALTHCARE | Age: 65
End: 2025-01-14
Payer: COMMERCIAL

## 2025-01-14 DIAGNOSIS — M54.2 CERVICALGIA: Primary | ICD-10-CM

## 2025-01-14 PROCEDURE — 97110 THERAPEUTIC EXERCISES: CPT | Performed by: PHYSICAL THERAPIST

## 2025-01-14 PROCEDURE — 97112 NEUROMUSCULAR REEDUCATION: CPT | Performed by: PHYSICAL THERAPIST

## 2025-01-14 NOTE — PROGRESS NOTES
"Daily Note     Today's date: 2025  Patient name: Maurice Crawford  : 1960  MRN: [id number]  Referring provider: Tasha Short CRNP  Dx:   Encounter Diagnosis     ICD-10-CM    1. Cervicalgia  M54.2                      Subjective: Pt states that he has good and bad days; will be going back to see the Dr soon.       Objective: See treatment diary below      Assessment: Tolerable to progression of program without c/o increased pain in the neck.  Remaining limited mobility of C/S and B shoulders with need for continued therapy to address restrictions and progress strengthening as pt is agreeable.     Plan: Continue per plan of care.      Precautions: Hx Skin CA, gout, HTN, OA, chronic pain       Re-eval Date:          Manuals 2025-7                                     Neuro Re-Ed        DNF training      DNF stabilization progression                                                         Ther Ex  1-7      UBE   6'  8'     UT stretch            Levator stretch 4-5x/20-30\" ea reviewed HEP         4-5x/20-30\" ea reviewed HEP  15\" x2             15\" x2 15\"x 2             15\"x 2      Pec stretch       B ER     Doorway stretch   20\" x 4     scap depression/retraction     Neck AROM-->snags  5x 10-15\" reviewed HEP     Chin tucks  Seated 4-5x/5-10\"   Reviewed HEP                  Seated  5\" x10         Seated   5\"x 10      MTP/LTP      Shoulder raises flex/scap  Green  1x15 ea      Cane   1x10 ea micah Blue   1x20 ea       Cane   1x10 ea Micah  Blue   X 30 ea       Cane   X 20 ea micah     Wall push up      Bent over rows   3\" x10      3\" x10 micah 3\" x 10       3\" x 10 Micah  X 20       5# x 10 micah     Pullies flex/scaption vs wall slides       Tband IR/ER  5\" 1x10          Micah ER  Red 1x10  5\"  1x 10 ea            Micah ER  Red 1x15  5\" x 10           Micah ER   Green x     SL shoulder flex, abd, ER       Prone I, T, Ys         Bicep curls      Tricep ext   3# 1x10      Green 1x10 5# 1 x15       Blue 1x15 5# " x 30     Ther Activity 5' total pt education regarding pathophysiology/pathoanatomy of present pain/sx and condition, role of PT in improving pain/sx and function, pt education regarding activity modification to avoid exacerbation of sx and delayed recovery. Increased pt e.d. regarding impact of sustained/repeated flexed/rounded posture on spine strain/pain ; advice for ergonomic set up with computer/phone use, , ADLs etc                         Gait Training                        Modalities prn                           1/6- HEP was issued and reviewed this date for above noted exercises. Pt demonstrated understanding without incident and without questions/concerns. Will continue to update upcoming.

## 2025-01-20 ENCOUNTER — OFFICE VISIT (OUTPATIENT)
Dept: PHYSICAL THERAPY | Facility: HOME HEALTHCARE | Age: 65
End: 2025-01-20
Payer: COMMERCIAL

## 2025-01-20 DIAGNOSIS — M54.2 CERVICALGIA: Primary | ICD-10-CM

## 2025-01-20 PROCEDURE — 97112 NEUROMUSCULAR REEDUCATION: CPT

## 2025-01-20 PROCEDURE — 97110 THERAPEUTIC EXERCISES: CPT

## 2025-01-20 NOTE — PROGRESS NOTES
"Daily Note     Today's date: 2025  Patient name: Maurice Crawford  : 1960  MRN: [id number]  Referring provider: Tasha Short CRNP  Dx: No diagnosis found.    Start Time: 08          Subjective: I had terrible pain in my R sh and deep in my neck until the weather changed and the snow came. Now I have no pain so I think it is Arthritis.     Objective: See treatment diary below    Assessment: Pt with good maddie to session. Pt able to complete TE without incident. Pt did report some mild increased R c-spine and UT discomfort during chin tucks at end of TE. Patient would benefit from continued PT    Plan: Continue per plan of care.      Precautions: Hx Skin CA, gout, HTN, OA, chronic pain       Re-eval Date:          Manuals 2025- 1-20                                   Neuro Re-Ed        DNF training      DNF stabilization progression                                                         Ther Ex  1-   1-20   UBE   6'  8'  8'   UT stretch            Levator stretch 4-5x/20-30\" ea reviewed HEP         4-5x/20-30\" ea reviewed HEP  15\" x2             15\" x2 15\"x 2             15\"x 2   15\" x3            15\" x3   Pec stretch       B ER     Doorway stretch   20\" x 4  Doorway  Stretch  15\" x10   scap depression/retraction     Neck AROM-->snags  5x 10-15\" reviewed HEP     Chin tucks  Seated 4-5x/5-10\"   Reviewed HEP                  Seated  5\" x10         Seated   5\"x 10           Home   MTP/LTP      Shoulder raises flex/scap  Green  1x15 ea      Cane   1x10 ea edelmira Blue   1x20 ea       Cane   1x10 ea Edelmira  Blue   X 30 ea       Cane   X 20 ea edelmira  Black  X30 ea      Cane  X20 ea edelmira   Wall push up      Bent over rows   3\" x10      3\" x10 edelmira 3\" x 10       3\" x 10 Edelmira  X 20       5# x 10 edelmira  X20      5# x20 edelmira   Pullies flex/scaption vs wall slides       Tband IR/ER  5\" 1x10          Edelmira ER  Red 1x10  5\"  1x 10 ea            Edelmira ER  Red 1x15  5\" x 10           Edelmira ER   Green x  5\" x10 " ea          Micah ER   Green x15   SL shoulder flex, abd, ER       Prone I, T, Ys         Bicep curls      Tricep ext   3# 1x10      Green 1x10 5# 1 x15       Blue 1x15 5# x 30  5# x30       Blue 1x15   Ther Activity 5' total pt education regarding pathophysiology/pathoanatomy of present pain/sx and condition, role of PT in improving pain/sx and function, pt education regarding activity modification to avoid exacerbation of sx and delayed recovery. Increased pt e.d. regarding impact of sustained/repeated flexed/rounded posture on spine strain/pain ; advice for ergonomic set up with computer/phone use, , ADLs etc                         Gait Training                        Modalities prn                           1/6- HEP was issued and reviewed this date for above noted exercises. Pt demonstrated understanding without incident and without questions/concerns. Will continue to update upcoming.

## 2025-01-21 ENCOUNTER — OFFICE VISIT (OUTPATIENT)
Dept: PHYSICAL THERAPY | Facility: HOME HEALTHCARE | Age: 65
End: 2025-01-21
Payer: COMMERCIAL

## 2025-01-21 DIAGNOSIS — M54.2 CERVICALGIA: Primary | ICD-10-CM

## 2025-01-21 PROCEDURE — 97110 THERAPEUTIC EXERCISES: CPT

## 2025-01-21 PROCEDURE — 97112 NEUROMUSCULAR REEDUCATION: CPT

## 2025-01-21 NOTE — PROGRESS NOTES
"Daily Note     Today's date: 2025  Patient name: Maurice Crawford  : 1960  MRN: [id number]  Referring provider: Tasha Short CRNP  Dx: No diagnosis found.    Start Time: 0835          Subjective: My neck just feels tight. I felt good after yesterdays session.     Objective: See treatment diary below    Assessment:  Pt maddie session well and was able to complete TE correctly with a few vc's needed.  Pt progressing well and has good understanding of progression/modification as daily ability allows. Pt denied pain t/o ex and reports consistency with HEP. Patient would benefit from continued PT    Plan: Continue per plan of care.      Precautions: Hx Skin CA, gout, HTN, OA, chronic pain       Re-eval Date:          Manuals                    Neuro Re-Ed        DNF training      DNF stabilization progression                         Ther Ex    UBE 8'  6'  8'  8'   UT  + levator stretch   15\" x3 ea 15\" x2 ea              15\"x 2 ea 15\" x2 ea 15 x3 ea   Pec stretch        Doorway  Stretch  15-20\" x 4   Doorway stretch   20\" x 4  Doorway  Stretch  15\" x10   scap depression/retraction     Neck AROM-->snags          Home         Seated  5\" x10         Seated   5\"x 10           Home   MTP/LTP      Shoulder raises flex/scap Black  X30 ea    Cane  X20 ea edelmira Green  1x15 ea      Cane   1x10 ea edelmira Blue   1x20 ea       Cane   1x10 ea Edelmira  Blue   X 30 ea       Cane   X 20 ea edelmira  Black  X30 ea      Cane  X20 ea edelmira   Wall push up    Bent over rows  X20    5# x20 edelmira 3\" x10      3\" x10 edelmira 3\" x 10     3\" x 10 Edelmira  X 20     5# x 10 edelmira  X20    5# x20 edelmira   Pullies flex/scaption vs wall slides     Tband IR/ER 5\" x 10 ea          Edelmira ER  Green x15 5\" 1x10          Edelmira ER  Red 1x10  5\"  1x 10 ea            Edelmira ER  Red 1x15  5\" x 10           Edelmira ER   Green x  5\" x10 ea          Edelmira ER   Green x15   SL shoulder flex, abd, ER       Prone I, T, Ys         Bicep curls    Tricep ext  5# " x30    Blue 1x15 3# 1x10      Green 1x10 5# 1 x15     Blue 1x15 5# x 30  5# x30     Blue 1x15   Ther Activity                        Gait Training                        Modalities prn                           1/6- HEP was issued and reviewed this date for above noted exercises. Pt demonstrated understanding without incident and without questions/concerns. Will continue to update upcoming.

## 2025-01-27 ENCOUNTER — OFFICE VISIT (OUTPATIENT)
Dept: PHYSICAL THERAPY | Facility: HOME HEALTHCARE | Age: 65
End: 2025-01-27
Payer: COMMERCIAL

## 2025-01-27 DIAGNOSIS — M54.2 CERVICALGIA: Primary | ICD-10-CM

## 2025-01-27 DIAGNOSIS — G89.29 CHRONIC NECK PAIN: ICD-10-CM

## 2025-01-27 DIAGNOSIS — M47.22 CERVICAL RADICULOPATHY DUE TO DEGENERATIVE JOINT DISEASE OF SPINE: ICD-10-CM

## 2025-01-27 DIAGNOSIS — M54.2 CHRONIC NECK PAIN: ICD-10-CM

## 2025-01-27 PROCEDURE — 97112 NEUROMUSCULAR REEDUCATION: CPT

## 2025-01-27 PROCEDURE — 97110 THERAPEUTIC EXERCISES: CPT

## 2025-01-27 NOTE — PROGRESS NOTES
"Daily Note     Today's date: 2025  Patient name: Maurice Crawford  : 1960  MRN: [id number]  Referring provider: Tasha Short CRNP  Dx:   Encounter Diagnosis     ICD-10-CM    1. Cervicalgia  M54.2       2. Chronic neck pain  M54.2     G89.29       3. Cervical radiculopathy due to degenerative joint disease of spine  M47.22                  Subjective: Pt reports no pain currently.       Objective: See treatment diary below      Assessment: Tolerated treatment well. Minimal verbal cues needed t/o session. Pt with no c/o pain t/o session or at end of session.  Patient would benefit from continued PT      Plan: Continue per plan of care.      Precautions: Hx Skin CA, gout, HTN, OA, chronic pain       Re-eval Date:          Manuals -                   Neuro Re-Ed        DNF training      DNF stabilization progression                         Ther Ex    UBE 8' 8' 6'  8'  8'   UT  + levator stretch   15\" x3 ea 15\" x 3 ea              15\"x 2 ea 15\" x2 ea 15 x3 ea   Pec stretch        Doorway  Stretch  15-20\" x 4 Doorway  Stretch  20\" x 4  Doorway stretch   20\" x 4  Doorway  Stretch  15\" x10   scap depression/retraction     Neck AROM-->snags          Home                Seated   5\"x 10           Home   MTP/LTP      Shoulder raises flex/scap Black  X30 ea    Cane  X20 ea micah Black   1x30 ea      Cane   1x20 ea micah Blue   1x20 ea       Cane   1x10 ea Micah  Blue   X 30 ea       Cane   X 20 ea micah  Black  X30 ea      Cane  X20 ea micah   Wall push up    Bent over rows  X20    5# x20 micah X 20      5# x 20 micah 3\" x 10     3\" x 10 Micah  X 20     5# x 10 micah  X20    5# x20 micah   Pullies flex/scaption vs wall slides     Tband IR/ER 5\" x 10 ea          Micah ER  Green x15 5\" 1x10          Micah ER  Green 1x15 5\"  1x 10 ea            Micah ER  Red 1x15  5\" x 10           Micah ER   Green x  5\" x10 ea          Micah ER   Green x15   SL shoulder flex, abd, ER       Prone I, T, Ys         Bicep " curls    Tricep ext  5# x30    Blue 1x15 5# 1x30    Blue 1x15 5# 1 x15     Blue 1x15 5# x 30  5# x30     Blue 1x15   Ther Activity                        Gait Training                        Modalities prn                           1/6- HEP was issued and reviewed this date for above noted exercises. Pt demonstrated understanding without incident and without questions/concerns. Will continue to update upcoming.

## 2025-01-28 ENCOUNTER — OFFICE VISIT (OUTPATIENT)
Dept: PHYSICAL THERAPY | Facility: HOME HEALTHCARE | Age: 65
End: 2025-01-28
Payer: COMMERCIAL

## 2025-01-28 ENCOUNTER — OFFICE VISIT (OUTPATIENT)
Dept: FAMILY MEDICINE CLINIC | Facility: CLINIC | Age: 65
End: 2025-01-28
Payer: COMMERCIAL

## 2025-01-28 VITALS
SYSTOLIC BLOOD PRESSURE: 132 MMHG | HEIGHT: 73 IN | OXYGEN SATURATION: 96 % | BODY MASS INDEX: 41.75 KG/M2 | DIASTOLIC BLOOD PRESSURE: 70 MMHG | HEART RATE: 77 BPM | WEIGHT: 315 LBS | TEMPERATURE: 97.3 F

## 2025-01-28 DIAGNOSIS — Z09 FOLLOW-UP EXAM: ICD-10-CM

## 2025-01-28 DIAGNOSIS — G89.29 CHRONIC NECK PAIN: ICD-10-CM

## 2025-01-28 DIAGNOSIS — R06.83 SNORING: ICD-10-CM

## 2025-01-28 DIAGNOSIS — M54.2 CERVICALGIA: Primary | ICD-10-CM

## 2025-01-28 DIAGNOSIS — M47.22 CERVICAL RADICULOPATHY DUE TO DEGENERATIVE JOINT DISEASE OF SPINE: ICD-10-CM

## 2025-01-28 DIAGNOSIS — M54.2 CHRONIC NECK PAIN: ICD-10-CM

## 2025-01-28 DIAGNOSIS — J01.90 ACUTE NON-RECURRENT SINUSITIS, UNSPECIFIED LOCATION: Primary | ICD-10-CM

## 2025-01-28 DIAGNOSIS — R10.13 DYSPEPSIA: ICD-10-CM

## 2025-01-28 PROCEDURE — 97112 NEUROMUSCULAR REEDUCATION: CPT | Performed by: PHYSICAL THERAPIST

## 2025-01-28 PROCEDURE — 97110 THERAPEUTIC EXERCISES: CPT | Performed by: PHYSICAL THERAPIST

## 2025-01-28 PROCEDURE — 99213 OFFICE O/P EST LOW 20 MIN: CPT | Performed by: NURSE PRACTITIONER

## 2025-01-28 RX ORDER — FLUTICASONE PROPIONATE 50 MCG
1 SPRAY, SUSPENSION (ML) NASAL DAILY
Qty: 9.9 ML | Refills: 2 | Status: SHIPPED | OUTPATIENT
Start: 2025-01-28

## 2025-01-28 RX ORDER — AMOXICILLIN 875 MG/1
875 TABLET, COATED ORAL 2 TIMES DAILY
Qty: 14 TABLET | Refills: 0 | Status: SHIPPED | OUTPATIENT
Start: 2025-01-28 | End: 2025-02-04

## 2025-01-28 NOTE — PROGRESS NOTES
PT Evaluation  and PT Discharge    Today's date: 2025  Patient name: Maurice Crawford  : 1960  MRN: [id number]  Referring provider: Tasha Short CRNP  Dx:   Encounter Diagnosis     ICD-10-CM    1. Cervicalgia  M54.2       2. Chronic neck pain  M54.2     G89.29       3. Cervical radiculopathy due to degenerative joint disease of spine  M47.22                      Assessment    Assessment details: UPDATE:  Pt reporting feeling much better over the past 4 weeks of therapy. He is going to transition to the gym in [place] and feels comfortable with discharge from therapy.  He has met outlined goals and demonstrating ROM/MMT WFL at this time. Thank you for this referral.     Maurice Crawford is a 64 y.o. male presenting to outpatient physical therapy with noted impairments including neck/shoulder pain, impaired soft tissue mobility c-spine/t-spine and B shoulder girdles, reduced range of motion, reduced global strength and mm endurance, reduced postural awareness, and reduced activity tolerance. Signs and symptoms at present are consistent with referring diagnosis of chronic neck/shoulder pain with signs/sx of possible bicipital tendonitis vs. Subacromial pain syndrome B shoulders. Due to noted impairments, the patient's present functional limitations include difficulty with ADLs with increased need for assistance, reliance on medication and/or modalities for pain relief, reduced tolerance for functional mobility and activity, and difficulty completing home and self care responsibilities. 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  Patient to benefit from skilled outpatient physical therapy 1-2x/week for 6-8 weeks in order to reduce pain, maximize pain free range of motion, increase strength and  "stability, and improve functional mobility/functional activity in order to maximize return to prior level of function with reduced limitations. Home exercise program was provided and all questions answered to patient's level of satisfaction. Thank you for your referral.        Understanding of Dx/Px/POC: good     Prognosis: good    Goals  STGs to be achieved in 4-6 weeks: - MET     1. Pt will report reduced neck/shoulder pain levels \"at worst\" by at least 2 points (0-10 scale) in order to allow improved tolerance for functional mobility and reduced reliance on medication or modalities for pain relief.   2. Pt will demonstrate improved AROM of R/L c-spine lateral flex and rotation by at least 5-10* in order to allow patient to safely turn his head while driving or crossing the street.   3. Pt will demonstrate improved deep neck flexor (DNF) strength and muscle endurance as noted by at least 10 second improvement in DNF test of endurance from SOC with proper form/tehnique without need for cues.   4. Pt will report overall improved tolerance for sustained mobility/activity by at least 25-50% since SOC with overall reduced pain levels and reduced fear avoidance.     LTGs to be achieved in 8-12 weeks: - MET     1. Pt will be independent with HEP, demonstrating proper technique with exercises and understanding of self progression of program without need for cueing or assistance.   2. Pt will report minimized pain levels with at least 50% reduction in pain since SOC.   3. Pt will demonstrate WFL AROM and strength of B Ues without increased pain/sx.   4. Pt will demonstrate understanding of proper posture and impact of poor posture/sustained poor postures on patient pain/sx.   5. 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   6. FOTO will reflect score at discharge that is greater than or equal to predicted level.         Plan    Treatment plan discussed with: patient and referring physician  Plan details: DC " "from OPPT         Subjective Evaluation    History of Present Illness  Mechanism of injury: UPDATE:  Pt reporting that he is going to be starting in the gym next month.  Feels a lot better with the neck and shoulders and feels ready to be done with therapy.     Pt notes hx Waxing/waning chronic neck and B shoulder pain for \"at least a year\". Hx R RTC repair and R bicep repair 2017. No known trauma/LUNA to have triggered present neck/shoulder pain per pt. Hx of chronic gout with joint aches/pains. Chronic LBP with/without sciatica B. Pt is a ; does feel he has been robert more in the past year as compared to prior years.   Notes he has been told he has arthritis in neck and shoulders per recent xrays. No additional testing to date per pt. Medications as Rx by PCP; feels the medications are helping. Notes overall he has good days /bad days. Some days he has a hard time moving his neck and/or shoulders. Some days the pain comes out of no where per pt. Notes he feels he can hear grinding in his neck/shoulders at times with AROM. Sometimes has good shoulder ROM, sometimes he can't move his shoulders/neck. Pt notes pain is worse with excessive activity, with bending too much, lifting too heavy etc; tries to avoid these things. Notes he can't stand long periods. Notes his neck /back does get sore/stiff with sitting long periods in the truck; tries to be mindful of posture. Looking down at phone long periods aggravates sx neck/shoulders. Limits overhead reaching; this can cause his shoulder to click and hurt at times.   Pt notes c/c of pain R/L anterior shoulder. Pain base of neck B; can radiate down spine. Hx of sciatica with n/t off/on R/L LE to ankle; Pain R/L UT. Also gets LBP. Intermittent n/t R/L UE but these seems to be positional such as sleeping on his arms or driving long periods; better with changing position. Hx of injections in the L/S. No hx of tx /injections for the neck. Did have a shot of " Toradol recently which helped a lot. Pt denies progressive UE/LE weakness. Does feel weakness in his arms/hands at times that comes/goes with sx; better since the Toradol per pt. Does feel he is still holding fluid in hands/feet/lower legs; comes/goes. Generally feels deconditioned; has been less active. Wants to start going to the gym in Feb. No BBI. No progressive LE weakness. No night pain. No saddle sx. No additional sx/complaints. Overall sx are unchanging.   Patient Goals  Patient goals for therapy: decreased pain, increased motion and increased strength    Pain  Current pain ratin  At best pain ratin  At worst pain rating: 3  Location: neck., shoulders, back  Quality: dull ache  Relieving factors: change in position  Aggravating factors: lifting  Progression: improved    Social Support    Employment status: working ()  Hand dominance: right      Diagnostic Tests  X-ray: abnormal  Treatments  Previous treatment: medication and injection treatment  Current treatment: medication and physical therapy        Objective     Postural Observations  Seated posture: poor  Standing posture: fair  Correction of posture: has no consistent effect    Additional Postural Observation Details  Moderate Forward head/rounded shoulders  Increased thoracic kyphosis   Increased B scapular depression and protraction with mild winging       Tenderness     Left Shoulder   No tenderness in the biceps tendon (proximal), bicipital groove and subacromial bursa.     Right Shoulder  No tenderness in the biceps tendon (proximal), bicipital groove and subacromial bursa.     Neurological Testing     Sensation   Cervical/Thoracic   Left   Intact: light touch    Right   Intact: light touch    Reflexes   Left   Biceps (C5/C6): normal (2+)  Brachioradialis (C6): normal (2+)  Triceps (C7): normal (2+)  Hernandez's reflex: negative    Right   Biceps (C5/C6): normal (2+)  Brachioradialis (C6): normal (2+)  Triceps (C7): normal  "(2+)  Hernandez's reflex: negative    Active Range of Motion   Cervical/Thoracic Spine       Cervical  Subcranial protraction:  WFL   Subcranial retraction: Active cervical subcranial retraction: to neutral; tight.   Flexion: 50 degrees   Extension: 30 degrees      Left lateral flexion: 27 degrees      Right lateral flexion: 32 degrees      Left rotation: 72 degrees  Right rotation: 78 degrees     Left Shoulder   Flexion: 170 degrees   Abduction: 160 degrees   External rotation BTH: T1   Internal rotation BTB: T10     Right Shoulder   Flexion: 170 degrees   Abduction: 160 degrees   External rotation BTH: T2   Internal rotation BTB: T9     Additional Active Range of Motion Details  No concordant pain/sx with/following AROM  Notes \"grinding\" intermittently with neck AROM without pain/sx     Strength/Myotome Testing   Cervical Spine     Left   Interossei strength (t1): 5    Right   Interossei strength (t1): 5    Left Shoulder     Planes of Motion   Flexion: 5   Abduction: 5   External rotation at 0°: 5   Internal rotation at 0°: 5     Isolated Muscles   Biceps: 5   Triceps: 5     Right Shoulder     Planes of Motion   Flexion: 5   Abduction: 5   External rotation at 0°: 5   Internal rotation at 0°: 5     Isolated Muscles   Biceps: 5   Triceps: 5     Left Elbow   Flexion: 5  Extension: 5    Right Elbow   Flexion: 5  Extension: 5    Left Wrist/Hand   Wrist extension: 5  Wrist flexion: 5  Thumb extension: 5    Right Wrist/Hand   Wrist extension: 5  Wrist flexion: 5  Thumb extension: 4 and 5    Tests   Cervical     Left   Negative Spurling's Test A.     Right   Negative Spurling's Test A.     Left Shoulder   Negative active compression (Hodges), empty can, full can, Hawkin's, Neer's, painful arc and Speed's.                      Precautions: Hx Skin CA, gout, HTN, OA, chronic pain   Re-eval Date:  2/5      Manuals 1-21 1/27 1/28 1/14 1-20                   Neuro Re-Ed 1-21       DNF training      DNF stabilization " "progression                         Ther Ex 1-21    1-20   UBE 8' 8' 8'  8'  8'   SRC   L5 10 minutes for conditioning and assessment to transition to wellness / gym program      UT  + levator stretch   15\" x3 ea 15\" x 3 ea               15\" x2 ea 15 x3 ea   Pec stretch        Doorway  Stretch  15-20\" x 4 Doorway  Stretch  20\" x 4  Doorway stretch   20\" x 4  Doorway  Stretch  15\" x10   scap depression/retraction     Neck AROM-->snags          Home                  Home   MTP/LTP      Shoulder raises flex/scap Black  X30 ea    Cane  X20 ea micah Black   1x30 ea      Cane   1x20 ea micah Matrix   37.5#   X 20 ea  Blue   X 30 ea       Cane   X 20 ea micah  Black  X30 ea      Cane  X20 ea micah   Wall push up    Bent over rows  X20    5# x20 micah X 20      5# x 20 micah  X 20     5# x 10 micah  X20    5# x20 micah   Pullies flex/scaption vs wall slides     Tband IR/ER 5\" x 10 ea          Micah ER  Green x15 5\" 1x10          Micah ER  Green 1x15  5\" x 10           Micah ER   Green x  5\" x10 ea          Micah ER   Green x15   SL shoulder flex, abd, ER       Prone I, T, Ys         Bicep curls    Tricep ext  5# x30    Blue 1x15 5# 1x30    Blue 1x15  5# x 30  5# x30     Blue 1x15   Ther Activity                        Gait Training                        Modalities prn                            "

## 2025-01-28 NOTE — PROGRESS NOTES
"Name: Maurice Crawford      : 1960      MRN: [id number]  Encounter Provider: ZBIGNIEW Burt  Encounter Date: 2025   Encounter department: [facility] PRIMARY CARE  :  Assessment & Plan  Acute non-recurrent sinusitis, unspecified location    Orders:  •  amoxicillin (AMOXIL) 875 mg tablet; Take 1 tablet (875 mg total) by mouth 2 (two) times a day for 7 days  •  fluticasone (FLONASE) 50 mcg/act nasal spray; 1 spray into each nostril daily    Dyspepsia  Avoid NSAIDs.  Continue omeprazole       Snoring    Orders:  •  Ambulatory Referral to Sleep Medicine; Future    Follow-up exam                History of Present Illness   Patient is here for follow-up for cervical degenerative disc disease/has been attending physical therapy.  Also has cold symptoms for over a week with sinus congestion.  The nasal spray having sinus facial pressure using Mucinex as well.  Follow-up on ear with sinuses cough still feels sick with some facial pressure, using nasal spray and Mucinex  So 6-week follow-up on neck pain has been going to physical therapy, does feel he is improved quite a bit  Lastly he went to his [facility] physical and apparently he scored high on our risk for sleep apnea so he was given a limited visit and told to get a sleep study.  He does admit to some snoring but he denies daytime somnolence he never falls asleep while he is driving.  His provider also wanted me to complete a list of his medications anything that might contribute to difficulty driving.  He is on a muscle relaxer as needed but he only uses that at sleep and he states he never feels hung over from it      Review of Systems    Objective   /70 (BP Location: Left arm, Patient Position: Sitting, Cuff Size: Extra-Large)   Pulse 77   Temp (!) 97.3 °F (36.3 °C) (Tympanic)   Ht 6' 1\" (1.854 m)   Wt (!) 152 kg (335 lb)   SpO2 96%   BMI 44.20 kg/m²      Physical Exam  Constitutional:       General: He is not in acute distress.     " Appearance: He is obese.   HENT:      Right Ear: Tympanic membrane normal.      Left Ear: Tympanic membrane normal.      Nose: Congestion and rhinorrhea present.   Cardiovascular:      Rate and Rhythm: Normal rate and regular rhythm.   Pulmonary:      Effort: Pulmonary effort is normal.      Breath sounds: Normal breath sounds.   Psychiatric:         Mood and Affect: Mood normal.

## 2025-01-31 ENCOUNTER — TELEPHONE (OUTPATIENT)
Age: 65
End: 2025-01-31

## 2025-01-31 NOTE — TELEPHONE ENCOUNTER
Scheduled date of EGD/colonoscopy (as of today): 3/4/25    Physician performing EGD/colonoscopy:  Duong    [facility] of EGD/colonoscopy[facility]    Romie from 2/4/25 due to FLU

## 2025-02-16 DIAGNOSIS — M1A.00X0 IDIOPATHIC CHRONIC GOUT WITHOUT TOPHUS, UNSPECIFIED SITE: ICD-10-CM

## 2025-02-17 RX ORDER — FEBUXOSTAT 40 MG/1
40 TABLET, FILM COATED ORAL DAILY
Qty: 90 TABLET | Refills: 1 | Status: SHIPPED | OUTPATIENT
Start: 2025-02-17

## 2025-02-22 DIAGNOSIS — I10 ESSENTIAL HYPERTENSION: ICD-10-CM

## 2025-02-23 RX ORDER — AMLODIPINE BESYLATE 10 MG/1
10 TABLET ORAL DAILY
Qty: 90 TABLET | Refills: 1 | Status: SHIPPED | OUTPATIENT
Start: 2025-02-23

## 2025-03-04 ENCOUNTER — ANESTHESIA (OUTPATIENT)
Dept: GASTROENTEROLOGY | Facility: HOSPITAL | Age: 65
End: 2025-03-04
Payer: COMMERCIAL

## 2025-03-04 ENCOUNTER — HOSPITAL ENCOUNTER (OUTPATIENT)
Dept: GASTROENTEROLOGY | Facility: HOSPITAL | Age: 65
Setting detail: OUTPATIENT SURGERY
Discharge: HOME | End: 2025-03-04
Attending: INTERNAL MEDICINE
Payer: COMMERCIAL

## 2025-03-04 ENCOUNTER — ANESTHESIA EVENT (OUTPATIENT)
Dept: GASTROENTEROLOGY | Facility: HOSPITAL | Age: 65
End: 2025-03-04
Payer: COMMERCIAL

## 2025-03-04 VITALS
TEMPERATURE: 97.9 F | OXYGEN SATURATION: 97 % | DIASTOLIC BLOOD PRESSURE: 58 MMHG | HEART RATE: 65 BPM | RESPIRATION RATE: 16 BRPM | SYSTOLIC BLOOD PRESSURE: 121 MMHG

## 2025-03-04 DIAGNOSIS — Z86.0100 HISTORY OF COLON POLYPS: ICD-10-CM

## 2025-03-04 DIAGNOSIS — R10.13 DYSPEPSIA: ICD-10-CM

## 2025-03-04 PROBLEM — K21.9 GASTROESOPHAGEAL REFLUX DISEASE: Status: ACTIVE | Noted: 2025-03-04

## 2025-03-04 PROCEDURE — 45380 COLONOSCOPY AND BIOPSY: CPT | Performed by: INTERNAL MEDICINE

## 2025-03-04 PROCEDURE — 88305 TISSUE EXAM BY PATHOLOGIST: CPT | Performed by: STUDENT IN AN ORGANIZED HEALTH CARE EDUCATION/TRAINING PROGRAM

## 2025-03-04 PROCEDURE — 43239 EGD BIOPSY SINGLE/MULTIPLE: CPT | Performed by: INTERNAL MEDICINE

## 2025-03-04 RX ORDER — PROPOFOL 10 MG/ML
INJECTION, EMULSION INTRAVENOUS CONTINUOUS PRN
Status: DISCONTINUED | OUTPATIENT
Start: 2025-03-04 | End: 2025-03-04

## 2025-03-04 RX ORDER — PROPOFOL 10 MG/ML
INJECTION, EMULSION INTRAVENOUS AS NEEDED
Status: DISCONTINUED | OUTPATIENT
Start: 2025-03-04 | End: 2025-03-04

## 2025-03-04 RX ORDER — KETAMINE HCL IN NACL, ISO-OSM 100MG/10ML
SYRINGE (ML) INJECTION AS NEEDED
Status: DISCONTINUED | OUTPATIENT
Start: 2025-03-04 | End: 2025-03-04

## 2025-03-04 RX ORDER — SODIUM CHLORIDE, SODIUM LACTATE, POTASSIUM CHLORIDE, CALCIUM CHLORIDE 600; 310; 30; 20 MG/100ML; MG/100ML; MG/100ML; MG/100ML
125 INJECTION, SOLUTION INTRAVENOUS CONTINUOUS
Status: DISCONTINUED | OUTPATIENT
Start: 2025-03-04 | End: 2025-03-08 | Stop reason: HOSPADM

## 2025-03-04 RX ORDER — SODIUM CHLORIDE, SODIUM LACTATE, POTASSIUM CHLORIDE, CALCIUM CHLORIDE 600; 310; 30; 20 MG/100ML; MG/100ML; MG/100ML; MG/100ML
20 INJECTION, SOLUTION INTRAVENOUS CONTINUOUS
Status: CANCELLED | OUTPATIENT
Start: 2025-03-04

## 2025-03-04 RX ORDER — GLYCOPYRROLATE 0.2 MG/ML
INJECTION INTRAMUSCULAR; INTRAVENOUS AS NEEDED
Status: DISCONTINUED | OUTPATIENT
Start: 2025-03-04 | End: 2025-03-04

## 2025-03-04 RX ORDER — SODIUM CHLORIDE, SODIUM LACTATE, POTASSIUM CHLORIDE, CALCIUM CHLORIDE 600; 310; 30; 20 MG/100ML; MG/100ML; MG/100ML; MG/100ML
INJECTION, SOLUTION INTRAVENOUS CONTINUOUS PRN
Status: DISCONTINUED | OUTPATIENT
Start: 2025-03-04 | End: 2025-03-04

## 2025-03-04 RX ADMIN — SODIUM CHLORIDE, SODIUM LACTATE, POTASSIUM CHLORIDE, AND CALCIUM CHLORIDE: .6; .31; .03; .02 INJECTION, SOLUTION INTRAVENOUS at 09:48

## 2025-03-04 RX ADMIN — Medication 25 MG: at 09:51

## 2025-03-04 RX ADMIN — PROPOFOL 150 MG: 10 INJECTION, EMULSION INTRAVENOUS at 09:51

## 2025-03-04 RX ADMIN — PROPOFOL 50 MG: 10 INJECTION, EMULSION INTRAVENOUS at 09:56

## 2025-03-04 RX ADMIN — PROPOFOL 150 MCG/KG/MIN: 10 INJECTION, EMULSION INTRAVENOUS at 10:02

## 2025-03-04 RX ADMIN — SODIUM CHLORIDE, SODIUM LACTATE, POTASSIUM CHLORIDE, AND CALCIUM CHLORIDE 125 ML/HR: .6; .31; .03; .02 INJECTION, SOLUTION INTRAVENOUS at 09:18

## 2025-03-04 RX ADMIN — GLYCOPYRROLATE 0.2 MG: 0.2 INJECTION INTRAMUSCULAR; INTRAVENOUS at 09:54

## 2025-03-04 NOTE — ANESTHESIA POSTPROCEDURE EVALUATION
Post-Op Assessment Note    CV Status:  Stable  Pain Score: 0    Pain management: adequate       Mental Status:  Alert and awake   Hydration Status:  Euvolemic   PONV Controlled:  Controlled   Airway Patency:  Patent     Post Op Vitals Reviewed: Yes    No anethesia notable event occurred.    Staff: CRNA           Last Filed PACU Vitals:  Vitals Value Taken Time   Temp 97.2    Pulse 70    /80    Resp 12    SpO2 99

## 2025-03-04 NOTE — H&P
History and Physical - [facility]  Maurice Crawford 64 y.o. male MRN: [id number]                  HPI: Maurice Crawford is a 64 y.o. year old male who presents for EGD and colonoscopy for evaluation of dyspepsia, GERD, and history of colon polyps.       REVIEW OF SYSTEMS: Per the HPI, and otherwise unremarkable.    Historical Information   Past Medical History:   Diagnosis Date    Back injury     Basal cell carcinoma     Buzzing in ear     Cancer (HCC) 2005    Skin Cancer    Colon polyp     Diverticulosis     Gout     Hearing loss     HL (hearing loss) 2020    Hypertension 9-23    Internal hemorrhoids     Osteoarthritis of both knees     Skin cancer     Vitamin D deficiency      Past Surgical History:   Procedure Laterality Date    ROTATOR CUFF REPAIR Right     with bicep repair    SKIN CANCER EXCISION      BCCA chest and back     Social History   Social History     Substance and Sexual Activity   Alcohol Use Never     Social History     Substance and Sexual Activity   Drug Use No     Social History     Tobacco Use   Smoking Status Never   Smokeless Tobacco Never     Family History   Problem Relation Age of Onset    Colon cancer Mother     Lung cancer Father     No Known Problems Sister     Cancer Maternal Grandmother     Cancer Maternal Grandfather     Bone cancer Paternal Grandmother     No Known Problems Paternal Grandfather     Bone cancer Maternal Aunt     Cancer Maternal Aunt     No Known Problems Maternal Aunt     No Known Problems Maternal Aunt     Throat cancer Cousin        Meds/Allergies       Current Outpatient Medications:     amLODIPine (NORVASC) 10 mg tablet    cholecalciferol (VITAMIN D3) 400 units tablet    fluticasone (FLONASE) 50 mcg/act nasal spray    irbesartan (AVAPRO) 300 mg tablet    methocarbamol (Robaxin-750) 750 mg tablet    omeprazole (PriLOSEC) 20 mg delayed release capsule    ciclopirox (PENLAC) 8 % solution    colchicine (COLCRYS) 0.6 mg tablet    febuxostat (ULORIC)  40 mg tablet    indomethacin (INDOCIN) 50 mg capsule    mometasone (NASONEX) 50 mcg/act nasal spray    Na Sulfate-K Sulfate-Mg Sulf 17.5-3.13-1.6 GM/177ML SOLN    NIFEdipine 0.3%-lidocaine 5% rectal ointment    Current Facility-Administered Medications:     lactated ringers infusion, 125 mL/hr, Intravenous, Continuous    lactated ringers infusion, 125 mL/hr, Intravenous, Continuous    Allergies   Allergen Reactions    Morphine Shortness Of Breath    Oxycodone Shortness Of Breath     Pt tolerated this in the past without SOB    Lisinopril Cough    Allopurinol Swelling    Cortisone Diarrhea, Abdominal Pain and Fever    Albuterol Palpitations       Objective     /77   Pulse 75   Temp 98.6 °F (37 °C) (Temporal)   Resp 18   SpO2 97%       PHYSICAL EXAM    Gen: NAD  Head: NCAT  CV: RRR  CHEST: Clear  ABD: soft, NT/ND  EXT: no edema      ASSESSMENT/PLAN:  This is a 64 y.o. year old male here for EGD and colonoscopy, and he is stable and optimized for his procedure.

## 2025-03-04 NOTE — ANESTHESIA PREPROCEDURE EVALUATION
Procedure:  EGD  COLONOSCOPY    Relevant Problems   ANESTHESIA (within normal limits)      CARDIO   (+) Essential hypertension      ENDO (within normal limits)      GI/HEPATIC   (+) Gastroesophageal reflux disease      /RENAL (within normal limits)      HEMATOLOGY (within normal limits)      MUSCULOSKELETAL   (+) Degenerative disc disease at L5-S1 level   (+) Gout      NEURO/PSYCH (within normal limits)      PULMONARY (within normal limits)      FEN/Gastrointestinal   (+) Diverticular disease      Other   (+) Elevated TSH      Nuclear Stress 12/11/2024:    Stress ECG: No ST deviation is noted. There were no arrhythmias during recovery. . The ECG was negative for ischemia. The stress ECG is negative for ischemia after pharmacologic vasodilation, without reproduction of symptoms.    Perfusion: There are no perfusion defects suggestive of ischemia or infarction on reprocessed images.    Stress Function: Left ventricular function post-stress is normal. Stress ejection fraction is over 70%.     No evidence of ischemia or infarction by pharmacologic vasodilatory nuclear stress testing.     Lab Results   Component Value Date    WBC 6.59 11/15/2024    HGB 14.2 11/15/2024    HCT 41.6 11/15/2024    MCV 89 11/15/2024     11/15/2024     Lab Results   Component Value Date    SODIUM 140 11/15/2024    K 4.1 11/15/2024     11/15/2024    CO2 26 11/15/2024    BUN 14 11/15/2024    CREATININE 0.85 11/15/2024    GLUC 104 11/10/2022    CALCIUM 9.0 11/15/2024     Lab Results   Component Value Date    INR 1.06 05/12/2019    PROTIME 13.2 05/12/2019     Lab Results   Component Value Date    HGBA1C 5.8 (H) 11/15/2024            Physical Exam    Airway    Mallampati score: III  TM Distance: >3 FB  Neck ROM: full     Dental   No notable dental hx     Cardiovascular  Cardiovascular exam normal    Pulmonary  Pulmonary exam normal     Other Findings        Anesthesia Plan  ASA Score- 3     Anesthesia Type- IV sedation with  anesthesia with ASA Monitors.         Additional Monitors:     Airway Plan:            Plan Factors-    Chart reviewed. EKG reviewed.  Existing labs reviewed. Patient summary reviewed.                  Induction- intravenous.    Postoperative Plan-         Informed Consent-       NPO Status:  No vitals data found for the desired time range.

## 2025-03-06 ENCOUNTER — RESULTS FOLLOW-UP (OUTPATIENT)
Age: 65
End: 2025-03-06

## 2025-03-06 PROCEDURE — 88305 TISSUE EXAM BY PATHOLOGIST: CPT | Performed by: STUDENT IN AN ORGANIZED HEALTH CARE EDUCATION/TRAINING PROGRAM

## 2025-03-06 NOTE — TELEPHONE ENCOUNTER
----- Message from Kim Watters DO sent at 3/6/2025  1:40 PM EST -----  Please set reminder for recall colonoscopy for  7 years

## 2025-03-10 ENCOUNTER — HOSPITAL ENCOUNTER (OUTPATIENT)
Dept: ULTRASOUND IMAGING | Facility: HOSPITAL | Age: 65
Discharge: HOME | End: 2025-03-10
Attending: INTERNAL MEDICINE
Payer: COMMERCIAL

## 2025-03-10 DIAGNOSIS — R10.13 DYSPEPSIA: ICD-10-CM

## 2025-03-10 PROCEDURE — 76705 ECHO EXAM OF ABDOMEN: CPT

## 2025-03-11 ENCOUNTER — RESULTS FOLLOW-UP (OUTPATIENT)
Age: 65
End: 2025-03-11

## 2025-03-11 NOTE — TELEPHONE ENCOUNTER
Read result note to Maurice. He states he keeps getting locked out and is frustrated with it.   Hi Maurice,  Good news- biopsies from your stomach were unremarkable. Polyps removed were as expected, precancerous but no advanced or alarming features.  Your next colonoscopy can be in 7 years.  Please let me know if you have any questions.

## 2025-03-11 NOTE — TELEPHONE ENCOUNTER
----- Message from Kim Watters DO sent at 3/11/2025  7:19 AM EDT -----  Patient did not see Pythagoras Solart message attached to these results; please call and read to them. Thank you!

## 2025-03-18 ENCOUNTER — OFFICE VISIT (OUTPATIENT)
Age: 65
End: 2025-03-18
Payer: COMMERCIAL

## 2025-03-18 VITALS
HEIGHT: 73 IN | OXYGEN SATURATION: 97 % | DIASTOLIC BLOOD PRESSURE: 70 MMHG | BODY MASS INDEX: 41.75 KG/M2 | SYSTOLIC BLOOD PRESSURE: 142 MMHG | TEMPERATURE: 97.9 F | WEIGHT: 315 LBS | HEART RATE: 82 BPM

## 2025-03-18 DIAGNOSIS — K21.9 GASTROESOPHAGEAL REFLUX DISEASE, UNSPECIFIED WHETHER ESOPHAGITIS PRESENT: Primary | ICD-10-CM

## 2025-03-18 DIAGNOSIS — K44.9 HIATAL HERNIA: ICD-10-CM

## 2025-03-18 DIAGNOSIS — Z86.0100 HISTORY OF COLON POLYPS: ICD-10-CM

## 2025-03-18 PROCEDURE — 99214 OFFICE O/P EST MOD 30 MIN: CPT | Performed by: INTERNAL MEDICINE

## 2025-03-19 NOTE — ASSESSMENT & PLAN NOTE
For follow-up after recent EGD with very mild esophagitis noted.  He was doing well on once daily PPI and counseled on continued use of this.

## 2025-03-19 NOTE — PROGRESS NOTES
Name: Maurice Crawford      : 1960      MRN: [id number]  Encounter Provider: Kim Watters DO  Encounter Date: 3/18/2025   Encounter department: [facility]  :  Assessment & Plan  Gastroesophageal reflux disease, unspecified whether esophagitis present  For follow-up after recent EGD with very mild esophagitis noted.  He was doing well on once daily PPI and counseled on continued use of this.       Hiatal hernia  Discussed 2 cm hiatal hernia, which may be exacerbating GERD.  No indication for surgical intervention at this time.         History of colon polyps  Due for repeat colonoscopy in  for surveillance       Follow-up in 1 year or sooner if needed    History of Present Illness   Maurice Crawford is a 64 y.o. male who presents follow-up after recent EGD completed for GERD, epigastric pain, colon cancer screening.  He was found to have grade a esophagitis, 2 cm hiatal hernia.  Biopsies were overall unremarkable.  Colonoscopy demonstrated 2 subcentimeter polyps which were resected and adenomatous on pathology.  7-year follow-up recommended.    He is doing well on once daily PPI.  He is taking appropriately.  He is not having any significant breakthrough reflux or other concerns at this time.  He notes he would like to pursue weight loss in the near future now that he is again retired.    I have spent a total time of 31 minutes in caring for this patient on the day of the visit/encounter including Diagnostic results, Risk factor reductions, Documenting in the medical record, and Obtaining or reviewing history  .   HPI  History obtained from: patient  Review of Systems A complete review of systems is negative other than that noted above in the HPI.      Current Outpatient Medications   Medication Sig Dispense Refill   • amLODIPine (NORVASC) 10 mg tablet TAKE 1 TABLET BY MOUTH EVERY DAY 90 tablet 1   • cholecalciferol (VITAMIN D3) 400 units tablet Take 400  "Units by mouth daily     • ciclopirox (PENLAC) 8 % solution Apply topically daily at bedtime 6.6 mL 3   • colchicine (COLCRYS) 0.6 mg tablet Take 1 tablet (0.6 mg total) by mouth daily 30 tablet 5   • febuxostat (ULORIC) 40 mg tablet TAKE 1 TABLET BY MOUTH EVERY DAY 90 tablet 1   • fluticasone (FLONASE) 50 mcg/act nasal spray 1 spray into each nostril daily 9.9 mL 2   • indomethacin (INDOCIN) 50 mg capsule Take 1 capsule (50 mg total) by mouth 3 (three) times a day with meals 30 capsule 1   • irbesartan (AVAPRO) 300 mg tablet Take 1 tablet (300 mg total) by mouth daily 100 tablet 3   • methocarbamol (Robaxin-750) 750 mg tablet Take 1 tablet (750 mg total) by mouth every 6 (six) hours as needed for muscle spasms 30 tablet 1   • mometasone (NASONEX) 50 mcg/act nasal spray SPRAY 2 SPRAYS INTO EACH NOSTRIL EVERY DAY     • Na Sulfate-K Sulfate-Mg Sulf 17.5-3.13-1.6 GM/177ML SOLN Take 177 mL by mouth 1 (one) time for 1 dose 177 mL 0   • NIFEdipine 0.3%-lidocaine 5% rectal ointment Apply 1 Application topically every 4 (four) hours as needed for discomfort or pain Apply a small amount to anal fissure 20 g 3   • omeprazole (PriLOSEC) 20 mg delayed release capsule Take 1 capsule (20 mg total) by mouth daily before breakfast 30 capsule 5     No current facility-administered medications for this visit.     Objective   /70 (BP Location: Left arm, Patient Position: Sitting, Cuff Size: Standard)   Pulse 82   Temp 97.9 °F (36.6 °C) (Temporal)   Ht 6' 1\" (1.854 m)   Wt (!) 152 kg (334 lb)   SpO2 97%   BMI 44.07 kg/m²     Physical Exam  Vitals and nursing note reviewed.   Constitutional:       General: He is not in acute distress.     Appearance: He is well-developed.   HENT:      Head: Normocephalic and atraumatic.   Eyes:      Conjunctiva/sclera: Conjunctivae normal.   Cardiovascular:      Rate and Rhythm: Normal rate and regular rhythm.   Pulmonary:      Effort: Pulmonary effort is normal.      Breath sounds: Normal " breath sounds.   Abdominal:      Palpations: Abdomen is soft.      Tenderness: There is no abdominal tenderness.   Musculoskeletal:         General: No swelling.      Cervical back: Neck supple.   Skin:     General: Skin is warm and dry.   Neurological:      Mental Status: He is alert.   Psychiatric:         Mood and Affect: Mood normal.            Lab Results: I personally reviewed relevant lab results.       Results for orders placed during the hospital encounter of 03/04/25    Colonoscopy    Impression  2 subcentimeter polyps in the ascending colon were removed with cold forceps biopsy  Diverticulosis in the ascending colon  Small hypertrophied anal papillae        RECOMMENDATION:  Await pathology results  Repeat colonoscopy in 7 years, due: 3/2/2032  Personal history of colon polyps              Kim Watters DO

## 2025-03-25 ENCOUNTER — OFFICE VISIT (OUTPATIENT)
Dept: SLEEP CENTER | Facility: CLINIC | Age: 65
End: 2025-03-25
Payer: COMMERCIAL

## 2025-03-25 VITALS
HEART RATE: 69 BPM | HEIGHT: 73 IN | DIASTOLIC BLOOD PRESSURE: 69 MMHG | SYSTOLIC BLOOD PRESSURE: 129 MMHG | WEIGHT: 315 LBS | BODY MASS INDEX: 41.75 KG/M2

## 2025-03-25 DIAGNOSIS — G47.8 OTHER SLEEP DISORDERS: Primary | ICD-10-CM

## 2025-03-25 DIAGNOSIS — R06.83 SNORING: ICD-10-CM

## 2025-03-25 DIAGNOSIS — Z91.89 AT RISK FOR OBSTRUCTIVE SLEEP APNEA: ICD-10-CM

## 2025-03-25 PROCEDURE — 99204 OFFICE O/P NEW MOD 45 MIN: CPT | Performed by: PSYCHIATRY & NEUROLOGY

## 2025-03-25 NOTE — PROGRESS NOTES
Lasix 60mg once day in am  Once start entresto (twice a day) we will stop poli and decrease the lasix to 40mg.  Labs 1 week after starting entresto    My Heart Failure Action Plan  Name: Aicha Lord  YOB: 1930  Date: 2/6/2024  My doctor: Reno Alfred [facility]     [facility] [place]     [address] [place] [place] [address] My Diagnosis: Diastolic Heart Failure  My Ejection Fraction: Over 50%  My Exercise Goal: 30 minutes daily     My Weight Goal: 134  Wt Readings from Last 2 Encounters:   02/06/24 60.8 kg (134 lb 1.6 oz)   02/02/24 60.8 kg (134 lb)     Weigh yourself daily using the same scale. If you gain more than 2 pounds in 24 hours or 5 pounds in a week   My Diet Goal: 2,000 mg of sodium    Emergency Room Visits:  Our goal is to improve your quality of life and help you avoid a visit to the emergency room or hospital. If we work together, we can achieve this goal. But, if you feel you need to call 911 or go to the emergency room, please do so. If you go to the emergency room, please bring your list of medicines and your daily weight chart with you.     GREEN ZONE Doing well today  Weight gained is no more than 2 pounds a day or 5 pounds a week.  No swelling in feet, ankles, legs or stomach.  No more swelling than usual.  No more trouble breathing than usual.  No change in my sleep.  No other problems. Actions:  I am doing fine. I will take my medicine, follow my diet, see my doctor, exercise, and watch for symptoms     YELLOW ZONE Having a bad day or flare up  Weight gained of more than 2 pounds in one day or 5 pounds in one week.  New swelling in ankle, leg, knee, or thigh.  Bloating in belly, pants feel tighter.  Swelling in hands or face.  Coughing or trouble breathing while walking or talking.  Harder to breathe last night.  Have trouble sleeping, wake up short of breath.  Much more tired than usual.  Not eating.  Pain in my chest or bad leg cramps.  Feel weak or dizzy. Actions:  I need  Name: Maurice Crawford      : 1960      MRN: [id number]  Encounter Provider: Jalil Maria MD  Encounter Date: 3/25/2025   Encounter department: [facility] [place]  :  Assessment & Plan  Snoring  -We discussed the necessity of assessing for obstructive sleep apnea as he has significant risk for this.  Testing is needed for 2 purposes, 1 occupational and 2 comorbid health conditions such as hypertension.  -We discussed guidelines with regard to obstructive sleep apnea and commercial driving.  If obstructive sleep apnea is identified, typically treated with CPAP would be needed.  He is open to this.  -Even if he had mild obstructive sleep apnea I would recommend treatment with CPAP given that he has a history of hypertension, requiring use of a few prescription medications  -We discussed CPAP and he is open to use of CPAP if needed.  Therefore I will order this after the sleep study.  -I ordered the test as a home sleep test to expedite the workup.  This is needed as his medical card expires in  (although he may retire in May)  Orders:  •  Ambulatory Referral to Sleep Medicine  •  Home Study; Future    Other sleep disorders    Orders:  •  Home Study; Future    At risk for obstructive sleep apnea    Orders:  •  Home Study; Future        History of Present Illness   HPI     Patient seen with Ann Merrill presents as there is concern for JOSE.  He was flagged as part of a DOT physical due to risk for JOSE.  He has a temporary medical card, this expires in .  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 Has never had drowsy driving, no close calls or near misses, no MVA from falling asleep     HPI: Maurice Crawford is a 65YO M with history including HTN, GERD, back pain, history of vit D deficiency who presents for sleep evaluation.    Sleep-Related History: No prior sleep studies.      ESS: Total score: 3/24  Greater or equal  to 10 is positive for excessive daytime sleepiness  Pertinent Meds: Took Ambien 20 years ago, none since.     Sleep-Wake Schedule:  He is self-described as having no morning/night preference.  Bedtime: 1930 on weekdays, 2100 on weekends, falls asleep within 2 minutes  Wake Time: 0430 or earlier on weekdays, 0700 on weekends  Difficulty Falling Asleep: No  Avg Number of Awakenings: About once a week  Cause of Awakenings: To urinate if drank water a lot  Weekend Sleep Schedule: [id number]  Naps: Very rarely    Avg TST per 24 hours: 7 hours    Sleep-Related Details:  Preferred Sleep Position: prone  SDB Symptoms: snoring only while supine  Bruxism: No  Nocturnal GERD: No  Nocturnal Palpitations: No  Nocturnal Anxiety or Rumination: No  Sleep-Related Hallucinations: No   Sleep Paralysis: Yes, over 30 years ago. Once in his life.   Cataplexy: No     Denied RLS symptoms    He denies any parasomnia activity.    Wake-Related Details  Daytime Sleepiness: No   Work Schedule: 4am/5am - 2pm/3pm  Drowsy Driving: No  Caffeine: Yes, 20oz of coffee a day, 16oz of pepsi  Alcohol Use: No  Substance Use: No  Tobacco Use: No  Weight Change: Gained 20 lbs in the past year. Joined a gym last week with the whole family.     He does not have difficulty with memory or concentration.    Past Treatments:  None    Prior Sleep Studies:  None    Other Relevant Labs and Studies:  Lab Results   Component Value Date    LXVV19RKFYIY 76.5 2023    NZVX67AKPCQV 62.2 2022    KGVZ48YLDZLC 58.0 2020    OHQ0CYINGUWB 2.137 11/15/2024    VDM4VCLHQDMX 1.506 2023     STOP-BAN  Body mass index is 43.54 kg/m².      Past Medical History:   Diagnosis Date   • Back injury    • Basal cell carcinoma    • Buzzing in ear    • Cancer (HCC)     Skin Cancer   • Clotting disorder (HCC) 24    Patterson?   • Colon polyp    • Diverticulosis    • Fatty liver    • Gout    • Hearing loss    • HL (hearing loss)    • Hypertension    •  to take action and call my doctor or nurse today.     RED ZONE Need medical care now  Weight gain of 5 pounds overnight.  Chest pain or pressure that does not go away.  Feel less alert.  Wheezing or have trouble breathing when at rest.  Cannot sleep lying down.  Cannot take my water pill.  Pass out or faint. Actions:  I need to call my doctor or nurse now!  Call 911 if I have chest pain or cannot breathe.       Electronically signed by: JULIUS Sosa-JAZMINE, February 6, 2024    Internal hemorrhoids    • Osteoarthritis of both knees    • Skin cancer    • Vitamin D deficiency      Past Surgical History:   Procedure Laterality Date   • ROTATOR CUFF REPAIR Right     with bicep repair   • SKIN CANCER EXCISION      BCCA chest and back     Patient Active Problem List   Diagnosis   • Lumbar back pain with radiculopathy affecting left lower extremity   • Degenerative disc disease at L5-S1 level   • Uric acid urolithiasis   • Gout   • Elevated TSH   • Diverticular disease   • Hearing loss   • Vitamin D deficiency   • Essential hypertension   • Gastroesophageal reflux disease     Allergies as of 03/25/2025 - Reviewed 03/18/2025   Allergen Reaction Noted   • Morphine Shortness Of Breath 11/19/2017   • Oxycodone Shortness Of Breath 05/12/2019   • Lisinopril Cough 08/28/2024   • Allopurinol Swelling 06/17/2014   • Cortisone Diarrhea, Abdominal Pain, and Fever 05/13/2019   • Albuterol Palpitations 12/08/2016     REVIEW OF SYSTEMS:  Negative unless noted     CURRENT MEDICATIONS:  Current Outpatient Medications   Medication Instructions   • amLODIPine (NORVASC) 10 mg, Oral, Daily   • cholecalciferol (VITAMIN D3) 400 Units, Daily   • ciclopirox (PENLAC) 8 % solution Topical, Daily at bedtime   • colchicine (COLCRYS) 0.6 mg, Oral, Daily   • febuxostat (ULORIC) 40 mg, Oral, Daily   • fluticasone (FLONASE) 50 mcg/act nasal spray 1 spray, Nasal, Daily   • indomethacin (INDOCIN) 50 mg, Oral, 3 times daily with meals   • irbesartan (AVAPRO) 300 mg, Oral, Daily   • methocarbamol (ROBAXIN-750) 750 mg, Oral, Every 6 hours PRN   • mometasone (NASONEX) 50 mcg/act nasal spray SPRAY 2 SPRAYS INTO EACH NOSTRIL EVERY DAY   • Na Sulfate-K Sulfate-Mg Sulf 17.5-3.13-1.6 GM/177ML SOLN 177 mL, Oral, Once   • NIFEdipine 0.3%-lidocaine 5% rectal ointment 1 Application, Topical, Every 4 hours PRN, Apply a small amount to anal fissure   • omeprazole (PRILOSEC) 20 mg, Oral, Daily before breakfast     SOCIAL HISTORY:  Social History  "    Tobacco Use   • Smoking status: Never   • Smokeless tobacco: Never   Vaping Use   • Vaping status: Never Used   Substance Use Topics   • Alcohol use: Never   • Drug use: No     FAMILY HISTORY:  Family History   Problem Relation Age of Onset   • Colon cancer Mother    • Lung cancer Father    • Stroke Father    • No Known Problems Sister    • Cancer Maternal Grandmother    • Cancer Maternal Grandfather    • Bone cancer Paternal Grandmother    • No Known Problems Paternal Grandfather    • Bone cancer Maternal Aunt    • Cancer Maternal Aunt    • No Known Problems Maternal Aunt    • No Known Problems Maternal Aunt    • Throat cancer Cousin      Family History of Sleep Disorders: None    PHYSICAL EXAMINATION:  Vital Signs:  /69 (BP Location: Left arm, Patient Position: Sitting, Cuff Size: Large)   Pulse 69   Ht 6' 1\" (1.854 m)   Wt (!) 150 kg (330 lb)   BMI 43.54 kg/m²   Body mass index is 43.54 kg/m².    Mallampati Grade : Mallampati 4  Oropharynx : crowded  Tonsil size : not enlarged   Tongue : Wide tongue  Soft palate and uvula : Elongated soft palate  Hard Palate : normal                 Appearance : no distress, alert, cooperative  Mental Status. Memory, and Affect : alert and oriented, normal affect, makes good eye contact, provides a detailed history  Cardiac- : regular rate and rhythm, S1, S2 normal, no murmur, click, rub or gallop  Pulmonary : clear to auscultation bilaterally                  Sitting and reading: Slight chance of dozing  Watching TV: Slight chance of dozing  Sitting, inactive in a public place (e.g. a theatre or a meeting): Would never doze  As a passenger in a car for an hour without a break: Would never doze  Lying down to rest in the afternoon when circumstances permit: Slight chance of dozing  Sitting and talking to someone: Would never doze  Sitting quietly after a lunch without alcohol: Would never doze  In a car, while stopped for a few minutes in traffic: Would never " "doze  Total score: 3     Review of Systems  Pertinent positives/negatives included in HPI and also as noted:       Objective   /69 (BP Location: Left arm, Patient Position: Sitting, Cuff Size: Large)   Pulse 69   Ht 6' 1\" (1.854 m)   Wt (!) 150 kg (330 lb)   BMI 43.54 kg/m²        Physical Exam    Data  Lab Results   Component Value Date    HGB 14.2 11/15/2024    HCT 41.6 11/15/2024    MCV 89 11/15/2024      Lab Results   Component Value Date    CALCIUM 9.0 11/15/2024    K 4.1 11/15/2024    CO2 26 11/15/2024     11/15/2024    BUN 14 11/15/2024    CREATININE 0.85 11/15/2024     No results found for: \"IRON\", \"TIBC\", \"FERRITIN\"  Lab Results   Component Value Date    AST 26 11/15/2024    ALT 41 11/15/2024         "

## 2025-03-25 NOTE — PATIENT INSTRUCTIONS
1.  Please schedule the sleep study as we discussed  2.  After the test is read (typically within 1 week), I will order a PAP machine and/or supplies if indicated by the results.  If the test is inconclusive, my office will be in touch to determine the next step (sooner follow-up, further testing etc)  3.  I would then want to see you for a followup visit about 5-6 weeks after you receive your machine at home.   Please bring your machine to the first visit  4.  If you have any difficulties using the machine, please contact the medical supply for machine related issues (mask fit, leakage, question about settings) or contact me if you have side effects, concern about the air pressure, or symptoms of concern.       It is very important to avoid driving while drowsy, this can be very dangerous or even cause serious injury or death.  If sleepy, it is not safe to get behind the wheel.  If you are driving and feels sleepy, it is very important to pull over right away.  Even losing control of the car for a split second can be deadly.  If you feel you cannot control when sleepiness occurs and cannot prevented, it is important to not drive at all until this improves.  Please let me know if you experience this as it is very important.     Nursing Support:  When: Monday through Friday 7:30A-4:30PM except holidays  Where: Our direct line is [phone]  *3  *1.      If you are having a true emergency please call 911.  In the event that the line is busy or it is after hours please leave a voice message and we will return your call.  Please speak clearly, leaving your full name, birth date, best number to reach you and the reason for your call.   Medication refills: We will need the name of the medication, the dosage, the ordering provider, whether you get a 30 or 90 day refill, and the pharmacy name and address.  Medications will be ordered by the provider only.  Nurses cannot call in prescriptions.  Please allow 7 days for  medication refills.  Physician requested updates: If your provider requested that you call with an update after starting medication, please be ready to provide us the medication and dosage, what time you take your medication, the time you attempt to fall asleep, time you fall asleep, when you wake up, and what time you get out of bed.  Sleep Study Results: We will contact you with sleep study results and/or next steps after the physician has reviewed your testing.

## 2025-03-25 NOTE — PROGRESS NOTES
[facility]  Sleep Medicine New Patient Evaluation    PATIENT NAME: Maurice Crawford  DATE OF SERVICE: March 25, 2025    CONSULTING PROVIDER:  ZBIGNIEW Burt  [address]  [place] [address]  Assessment & Plan  Snoring    Other sleep disorders    At risk for obstructive sleep apnea  Diagnoses and all orders for this visit:    Other sleep disorders  -     Home Study; Future    Snoring  -     Ambulatory Referral to Sleep Medicine  -     Home Study; Future    At risk for obstructive sleep apnea  -     Home Study; Future    ***    Thank you for allowing me to participate in the care of your patient.  If there are any questions regarding evaluation please feel free to reach out.   ________________________________________________________________________________________________  Patient seen with Ann Merrill presents as there is concern for JOSE.  He was flagged as part of a DOT physical due to risk for JOSE.  He has a temporary medical card, this expires in June.  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 Has never had drowsy driving, no close calls or near misses, no MVA from falling asleep     HPI: Maurice Crawford is a 65YO M with history including HTN, GERD, back pain, history of vit D deficiency who presents for sleep evaluation.  Was flagged at last CDL to be started on HTN, and was flagged to get a sleep study  Sleeps on stomach  Only snores when sleeping supine  Feels refreshed upon awakening  Plans to retire soon in a couple months  Just changed from nights to days  Sleep-Related History: No prior sleep studies.    ***    ESS: Total score: 3/24  Greater or equal to 10 is positive for excessive daytime sleepiness  Pertinent Meds: Took Ambien 20 years ago, none since.     Sleep-Wake Schedule:  He is self-described as having no morning/night preference.  Bedtime: 1930 on weekdays, 2100 on weekends, falls asleep  within 2 minutes  Wake Time: 0430 or earlier on weekdays, 0700 on weekends  Difficulty Falling Asleep: No  Avg Number of Awakenings: About once a week  Cause of Awakenings: To urinate if drank water a lot  Weekend Sleep Schedule: [id number]  Naps: Very rarely    Avg TST per 24 hours: 7 hours    Sleep-Related Details:  Preferred Sleep Position: prone  SDB Symptoms: snoring only while supine  Bruxism: No  Nocturnal GERD: No  Nocturnal Palpitations: No  Nocturnal Anxiety or Rumination: No  Sleep-Related Hallucinations: No   Sleep Paralysis: Yes, over 30 years ago. Once in his life.    Cataplexy: No     Denied RLS symptoms    He denies any parasomnia activity.    Wake-Related Details  Daytime Sleepiness: No   Work Schedule: 4am/5am - 2pm/3pm  Drowsy Driving: No  Caffeine: Yes, 20oz of coffee a day , 16oz of pepsi  Alcohol Use: No  Substance Use: No  Tobacco Use: No  Weight Change: Gained 20 lbs in the past year. Joined a gym last week with the whole family.     He does not have difficulty with memory or concentration.    Past Treatments:  None    Prior Sleep Studies:  None    Other Relevant Labs and Studies:  Lab Results   Component Value Date    RPJO32TNWNKA 76.5 2023    GLRV58MJBSDE 62.2 2022    WDYW77LNCSYD 58.0 2020    AGF6NOZKGMHB 2.137 11/15/2024    DQR6PMEVVIXM 1.506 2023     STOP-BAN  Body mass index is 43.54 kg/m².      Past Medical History:   Diagnosis Date   • Back injury    • Basal cell carcinoma    • Buzzing in ear    • Cancer (HCC)     Skin Cancer   • Clotting disorder (HCC) 24    Patterson?   • Colon polyp    • Diverticulosis    • Fatty liver    • Gout    • Hearing loss    • HL (hearing loss)    • Hypertension    • Internal hemorrhoids    • Osteoarthritis of both knees    • Skin cancer    • Vitamin D deficiency      Past Surgical History:   Procedure Laterality Date   • ROTATOR CUFF REPAIR Right     with bicep repair   • SKIN CANCER EXCISION      BCCA chest  and back     Patient Active Problem List   Diagnosis   • Lumbar back pain with radiculopathy affecting left lower extremity   • Degenerative disc disease at L5-S1 level   • Uric acid urolithiasis   • Gout   • Elevated TSH   • Diverticular disease   • Hearing loss   • Vitamin D deficiency   • Essential hypertension   • Gastroesophageal reflux disease     Allergies as of 03/25/2025 - Reviewed 03/18/2025   Allergen Reaction Noted   • Morphine Shortness Of Breath 11/19/2017   • Oxycodone Shortness Of Breath 05/12/2019   • Lisinopril Cough 08/28/2024   • Allopurinol Swelling 06/17/2014   • Cortisone Diarrhea, Abdominal Pain, and Fever 05/13/2019   • Albuterol Palpitations 12/08/2016     REVIEW OF SYSTEMS:***  Review of Systems   Constitutional:  Negative for appetite change, chills, diaphoresis, fatigue and fever.   Eyes:  Negative for photophobia and visual disturbance.   Respiratory:  Negative for shortness of breath.    Cardiovascular:  Negative for chest pain.   Gastrointestinal:  Negative for abdominal pain.   Neurological:  Negative for dizziness, weakness, light-headedness, numbness and headaches.   Psychiatric/Behavioral:  Negative for sleep disturbance.    All other systems reviewed and are negative.    10-point system review completed, all of which are negative except as mentioned above.    CURRENT MEDICATIONS:  Current Outpatient Medications   Medication Instructions   • amLODIPine (NORVASC) 10 mg, Oral, Daily   • cholecalciferol (VITAMIN D3) 400 Units, Daily   • ciclopirox (PENLAC) 8 % solution Topical, Daily at bedtime   • colchicine (COLCRYS) 0.6 mg, Oral, Daily   • febuxostat (ULORIC) 40 mg, Oral, Daily   • fluticasone (FLONASE) 50 mcg/act nasal spray 1 spray, Nasal, Daily   • indomethacin (INDOCIN) 50 mg, Oral, 3 times daily with meals   • irbesartan (AVAPRO) 300 mg, Oral, Daily   • methocarbamol (ROBAXIN-750) 750 mg, Oral, Every 6 hours PRN   • mometasone (NASONEX) 50 mcg/act nasal spray SPRAY 2 SPRAYS  "INTO EACH NOSTRIL EVERY DAY   • Na Sulfate-K Sulfate-Mg Sulf 17.5-3.13-1.6 GM/177ML SOLN 177 mL, Oral, Once   • NIFEdipine 0.3%-lidocaine 5% rectal ointment 1 Application, Topical, Every 4 hours PRN, Apply a small amount to anal fissure   • omeprazole (PRILOSEC) 20 mg, Oral, Daily before breakfast     SOCIAL HISTORY:  Social History     Tobacco Use   • Smoking status: Never   • Smokeless tobacco: Never   Vaping Use   • Vaping status: Never Used   Substance Use Topics   • Alcohol use: Never   • Drug use: No     FAMILY HISTORY:  Family History   Problem Relation Age of Onset   • Colon cancer Mother    • Lung cancer Father    • Stroke Father    • No Known Problems Sister    • Cancer Maternal Grandmother    • Cancer Maternal Grandfather    • Bone cancer Paternal Grandmother    • No Known Problems Paternal Grandfather    • Bone cancer Maternal Aunt    • Cancer Maternal Aunt    • No Known Problems Maternal Aunt    • No Known Problems Maternal Aunt    • Throat cancer Cousin      Family History of Sleep Disorders: None    PHYSICAL EXAMINATION:  Vital Signs:  /69 (BP Location: Left arm, Patient Position: Sitting, Cuff Size: Large)   Pulse 69   Ht 6' 1\" (1.854 m)   Wt (!) 150 kg (330 lb)   BMI 43.54 kg/m²   Body mass index is 43.54 kg/m².    Mallampati Grade : Mallampati 4  Oropharynx : crowded  Tonsil size : not enlarged   Tongue : Wide tongue  Soft palate and uvula : Elongated soft palate  Hard Palate : normal   {Neck Circumference (Optional):45319}  {Neck (Optional):35377}  {Nose (Optional):91573}  {Craniofacial anatomy (Optional):71396::\"normal\"}  {Dental Stucture and Dentition (Optional):21673}      Appearance : no distress, alert, cooperative  Mental Status. Memory, and Affect : alert and oriented, normal affect, makes good eye contact, provides a detailed history  Cardiac- : regular rate and rhythm, S1, S2 normal, no murmur, click, rub or gallop  Pulmonary : clear to auscultation bilaterally  {Neuro " "(Optional):37494::\"Cranial Nerves: CN II-XII grossly intact\"}  {Extremities (Optional):41908::\"No peripheral edema\"}      I have spent a total time of *** minutes on 03/25/25 in caring for this patient including {AMB Counseling Topics:[id number]}.    Ann Moseley's Neurology Resident PGY3  03/25/25    Portions of the record may have been created with voice recognition software. Occasional wrong word or \"sound a like\" substitutions may have occurred due to the inherent limitations of voice recognition software. Please read the chart carefully and recognize, using context, where substitutions have occurred.    "

## 2025-04-01 ENCOUNTER — HOSPITAL ENCOUNTER (OUTPATIENT)
Dept: SLEEP CENTER | Facility: HOSPITAL | Age: 65
Discharge: HOME | End: 2025-04-01
Attending: PSYCHIATRY & NEUROLOGY
Payer: COMMERCIAL

## 2025-04-01 DIAGNOSIS — G47.8 OTHER SLEEP DISORDERS: ICD-10-CM

## 2025-04-01 DIAGNOSIS — R06.83 SNORING: ICD-10-CM

## 2025-04-01 DIAGNOSIS — Z91.89 AT RISK FOR OBSTRUCTIVE SLEEP APNEA: ICD-10-CM

## 2025-04-01 PROCEDURE — G0399 HOME SLEEP TEST/TYPE 3 PORTA: HCPCS

## 2025-04-02 NOTE — PROGRESS NOTES
Home Sleep Study Documentation    HOME STUDY DEVICE: Noxturnal no                                           Shelby G3 yes      Pre-Sleep Home Study:    Set-up and instructions performed by: EDMAR Kenny    Technician performed demonstration for Patient: yes    Return demonstration performed by Patient: yes    Written instructions provided to Patient: yes    Patient signed consent form: yes        Post-Sleep Home Study:    Additional comments by Patient: pending    Home Sleep Study Failed:pending    Failure reason: pending    Reported or Detected: pending    Scored by: pending

## 2025-04-09 PROBLEM — G47.33 OSA (OBSTRUCTIVE SLEEP APNEA): Status: ACTIVE | Noted: 2025-04-09

## 2025-04-13 ENCOUNTER — RESULTS FOLLOW-UP (OUTPATIENT)
Dept: SLEEP CENTER | Facility: CLINIC | Age: 65
End: 2025-04-13

## 2025-04-13 DIAGNOSIS — G47.8 OTHER SLEEP DISORDERS: ICD-10-CM

## 2025-04-13 DIAGNOSIS — R06.83 SNORING: Primary | ICD-10-CM

## 2025-04-13 DIAGNOSIS — Z91.89 AT RISK FOR OBSTRUCTIVE SLEEP APNEA: ICD-10-CM

## 2025-04-14 PROBLEM — G47.8 OTHER SLEEP DISORDERS: Status: ACTIVE | Noted: 2025-04-14

## 2025-04-14 NOTE — TELEPHONE ENCOUNTER
----- Message from Jalil Maria MD sent at 4/13/2025 10:25 AM EDT -----  Would recommend PSG. 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

## 2025-04-14 NOTE — TELEPHONE ENCOUNTER
Received call from patient asking for sleep study results.     Advised of home study results and order for in-lab diagnostic.  Patient declined to schedule in-lab study.  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  He has no issues with daytime sleepiness.  He has his physical coming up and will have the provider review his home study results at his physical.

## 2025-05-28 ENCOUNTER — RA CDI HCC (OUTPATIENT)
Dept: OTHER | Facility: HOSPITAL | Age: 65
End: 2025-05-28

## 2025-05-28 NOTE — PROGRESS NOTES
HCC coding opportunities          Chart Reviewed number of suggestions sent to Provider: 1    E66.01, Z68.41     Patients Insurance        Commercial Insurance: [employer]

## 2025-06-02 ENCOUNTER — OFFICE VISIT (OUTPATIENT)
Dept: FAMILY MEDICINE CLINIC | Facility: CLINIC | Age: 65
End: 2025-06-02
Payer: MEDICARE

## 2025-06-02 VITALS
SYSTOLIC BLOOD PRESSURE: 134 MMHG | OXYGEN SATURATION: 97 % | DIASTOLIC BLOOD PRESSURE: 86 MMHG | HEART RATE: 77 BPM | BODY MASS INDEX: 41.75 KG/M2 | TEMPERATURE: 98.5 F | WEIGHT: 315 LBS | HEIGHT: 73 IN

## 2025-06-02 DIAGNOSIS — E07.89 OTHER SPECIFIED DISORDERS OF THYROID: ICD-10-CM

## 2025-06-02 DIAGNOSIS — M10.9 GOUT, UNSPECIFIED CAUSE, UNSPECIFIED CHRONICITY, UNSPECIFIED SITE: ICD-10-CM

## 2025-06-02 DIAGNOSIS — M54.16 LUMBAR BACK PAIN WITH RADICULOPATHY AFFECTING LEFT LOWER EXTREMITY: ICD-10-CM

## 2025-06-02 DIAGNOSIS — G47.33 OSA (OBSTRUCTIVE SLEEP APNEA): ICD-10-CM

## 2025-06-02 DIAGNOSIS — Z12.5 SCREENING FOR PROSTATE CANCER: ICD-10-CM

## 2025-06-02 DIAGNOSIS — Z00.00 WELCOME TO MEDICARE PREVENTIVE VISIT: Primary | ICD-10-CM

## 2025-06-02 DIAGNOSIS — R73.09 ELEVATED GLUCOSE: ICD-10-CM

## 2025-06-02 DIAGNOSIS — I10 ESSENTIAL HYPERTENSION: ICD-10-CM

## 2025-06-02 DIAGNOSIS — R79.89 ELEVATED TSH: ICD-10-CM

## 2025-06-02 DIAGNOSIS — K21.00 GASTROESOPHAGEAL REFLUX DISEASE WITH ESOPHAGITIS WITHOUT HEMORRHAGE: ICD-10-CM

## 2025-06-02 DIAGNOSIS — Z13.220 SCREENING CHOLESTEROL LEVEL: ICD-10-CM

## 2025-06-02 DIAGNOSIS — E55.9 VITAMIN D DEFICIENCY: ICD-10-CM

## 2025-06-02 DIAGNOSIS — R79.89 ABNORMAL CBC: ICD-10-CM

## 2025-06-02 PROCEDURE — G0402 INITIAL PREVENTIVE EXAM: HCPCS | Performed by: NURSE PRACTITIONER

## 2025-06-02 NOTE — PROGRESS NOTES
Name: Maurice Crawford      : 1960      MRN: [id number]  Encounter Provider: ZBIGNIEW Burt  Encounter Date: 2025   Encounter department: [facility]  :  Assessment & Plan  Welcome to Medicare preventive visit         Lumbar back pain with radiculopathy affecting left lower extremity  Stable for now.  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  Orders:  •  Vitamin D 25 hydroxy; Future    Essential hypertension  Stable       Screening cholesterol level  Continue working on diet and exercise  Orders:  •  Lipid Panel with Direct LDL reflex; Future    Elevated glucose  Continue working on diet and exercise  Orders:  •  Comprehensive metabolic panel; Future  •  Hemoglobin A1C; Future    Elevated TSH    Orders:  •  TSH, 3rd generation with Free T4 reflex; Future    Abnormal CBC    Orders:  •  CBC and differential; Future    Screening for prostate cancer    Orders:  •  PSA, Total Screen; Future    Other specified disorders of thyroid  Doing 6 months  Orders:  •  TSH, 3rd generation with Free T4 reflex; Future       Preventive health issues were discussed with patient, and age appropriate screening tests were ordered as noted in patient's After Visit Summary. Personalized health advice and appropriate referrals for health education or preventive services given if needed, as noted in patient's After Visit Summary.    History of Present Illness     Here for welcome to Medicare.  He did have a sleep study in March that actually did not detect sleep  apnea.  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  [facility] had only extended him for a year due to risk for sleep apnea.    Overall he is doing quite well.  He just retired a month ago.  He is lost about 4 pounds.  He is trying to watch his diet a little bit and the started going to the gym with his wife.  Mood is very good.  Denies chest pain shortness of breath or syncope with exercise.  He really denies any symptoms of sleep apnea right now-states he does not snore, he has no somnolence he does not fall asleep while driving or while sitting.  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     Health Risk Assessment:   Patient rates overall health as good. Patient feels that their physical health rating is much better. Patient is very satisfied with their life. Eyesight was rated as same. Hearing was rated as same. Patient feels that their emotional and mental health rating is much better. Patients states they are never, rarely angry. Patient states they are never, rarely unusually tired/fatigued. Pain experienced in the last 7 days has been some. Patient's pain rating has been 3/10. Patient states that he has experienced weight loss or gain in last 6 months.     Depression Screening:   PHQ-2 Score: 0      Fall Risk Screening:   In the past year, patient has experienced: no history of falling in past year      Home Safety:  Patient does not have trouble with stairs inside or outside of their home. Patient has working smoke alarms and has working carbon monoxide detector.  Home safety hazards include: none.     Nutrition:   Current diet is Diabetic and No Added Salt.     Medications:   Patient is currently taking over-the-counter supplements. OTC medications include: see medication list. Patient is able to manage medications.     Activities of Daily Living (ADLs)/Instrumental Activities of Daily Living (IADLs):   Walk and transfer into and out of bed and chair?: Yes  Dress and groom yourself?: Yes    Bathe or shower yourself?: Yes    Feed yourself? Yes  Do your laundry/housekeeping?: Yes  Manage your money, pay your bills and track your expenses?: Yes  Make your own meals?: Yes    Do your own shopping?: Yes    Previous Hospitalizations:   Any hospitalizations or ED visits within the last 12 months?: No      Advance Care Planning:   Living will: Yes    Durable POA for healthcare: Yes    Advanced directive: Yes    Advanced directive counseling given: Yes    ACP document given: Yes    Patient declined ACP directive: No    End of Life Decisions reviewed with patient: Yes    Provider agrees with end of life decisions: Yes      Cognitive Screening:   Provider or family/friend/caregiver concerned regarding cognition?: No    Preventive Screenings      Cardiovascular Screening:    General: Screening Current      Diabetes Screening:     General: Screening Current      Colorectal Cancer Screening:     General: Screening Current      Prostate Cancer Screening:    General: Screening Current      Abdominal Aortic Aneurysm (AAA) Screening:    Risk factors include: age between 65-76 yo        Lung Cancer Screening:     General: Screening Not Indicated    Immunizations:  - Immunizations due: Prevnar 20 and Zoster (Shingrix)    Screening, Brief Intervention, and Referral to Treatment (SBIRT)     Screening  Typical number of drinks in a day: 0  Typical number of drinks in a week: 0  Interpretation: Low risk drinking behavior.    AUDIT-C Screenin) How often did you have a drink containing alcohol in  "the past year? monthly or less  2) How many drinks did you have on a typical day when you were drinking in the past year? 1 to 2  3) How often did you have 6 or more drinks on one occasion in the past year? never    AUDIT-C Score: 1  Interpretation: Score 0-3 (male): Negative screen for alcohol misuse    Single Item Drug Screening:  How often have you used an illegal drug (including marijuana) or a prescription medication for non-medical reasons in the past year? never    Single Item Drug Screen Score: 0  Interpretation: Negative screen for possible drug use disorder    Social Drivers of Health     Food Insecurity: No Food Insecurity (6/2/2025)    Nursing - Inadequate Food Risk Classification    • Worried About Running Out of Food in the Last Year: Never true    • Ran Out of Food in the Last Year: Never true   Transportation Needs: No Transportation Needs (6/2/2025)    PRAPARE - Transportation    • Lack of Transportation (Medical): No    • Lack of Transportation (Non-Medical): No   Housing Stability: Low Risk  (6/2/2025)    Housing Stability Vital Sign    • Unable to Pay for Housing in the Last Year: No    • Number of Times Moved in the Last Year: 0    • Homeless in the Last Year: No   Utilities: Not At Risk (6/2/2025)    [facility] Utilities    • Threatened with loss of utilities: No     Vision Screening    Right eye Left eye Both eyes   Without correction      With correction 20/20 20/20 20/20       Objective   /86 (BP Location: Left arm, Patient Position: Sitting, Cuff Size: Adult)   Pulse 77   Temp 98.5 °F (36.9 °C) (Tympanic)   Ht 6' 1\" (1.854 m)   Wt (!) 149 kg (329 lb 3.2 oz)   SpO2 97%   BMI 43.43 kg/m²     Physical Exam  Vitals and nursing note reviewed.   Constitutional:       General: He is not in acute distress.     Appearance: He is well-developed.   HENT:      Head: Normocephalic and atraumatic.     Eyes:      Conjunctiva/sclera: Conjunctivae normal.       Cardiovascular:      Rate and Rhythm: " Normal rate and regular rhythm.      Heart sounds: No murmur heard.  Pulmonary:      Effort: Pulmonary effort is normal. No respiratory distress.      Breath sounds: Normal breath sounds.   Abdominal:      Palpations: Abdomen is soft.      Tenderness: There is no abdominal tenderness.     Musculoskeletal:         General: No swelling.      Cervical back: Neck supple.     Skin:     General: Skin is warm and dry.      Capillary Refill: Capillary refill takes less than 2 seconds.     Neurological:      Mental Status: He is alert.     Psychiatric:         Mood and Affect: Mood normal.

## 2025-06-02 NOTE — PATIENT INSTRUCTIONS
Medicare Preventive Visit Patient Instructions  Thank you for completing your Welcome to Medicare Visit or Medicare Annual Wellness Visit today. Your next wellness visit will be due in one year (6/3/2026).  The screening/preventive services that you may require over the next 5-10 years are detailed below. Some tests may not apply to you based off risk factors and/or age. Screening tests ordered at today's visit but not completed yet may show as past due. Also, please note that scanned in results may not display below.  Preventive Screenings:  Service Recommendations Previous Testing/Comments   Colorectal Cancer Screening  Colonoscopy    Fecal Occult Blood Test (FOBT)/Fecal Immunochemical Test (FIT)  Fecal DNA/Cologuard Test  Flexible Sigmoidoscopy Age: 45-75 years old   Colonoscopy: every 10 years (May be performed more frequently if at higher risk)  OR  FOBT/FIT: every 1 year  OR  Cologuard: every 3 years  OR  Sigmoidoscopy: every 5 years  Screening may be recommended earlier than age 45 if at higher risk for colorectal cancer. Also, an individualized decision between you and your healthcare provider will decide whether screening between the ages of 76-85 would be appropriate. Colonoscopy: 03/04/2025  FOBT/FIT: Not on file  Cologuard: Not on file  Sigmoidoscopy: Not on file    Screening Current     Prostate Cancer Screening Individualized decision between patient and health care provider in men between ages of 55-69   Medicare will cover every 12 months beginning on the day after your 50th birthday PSA: 0.563 ng/mL     Screening Current     Hepatitis C Screening Once for adults born between 1945 and 1965  More frequently in patients at high risk for Hepatitis C Hep C Antibody: Not on file        Diabetes Screening 1-2 times per year if you're at risk for diabetes or have pre-diabetes Fasting glucose: 94 mg/dL (11/15/2024)  A1C: 5.8 % (11/15/2024)  Screening Current   Cholesterol Screening Once every 5 years if you  don't have a lipid disorder. May order more often based on risk factors. Lipid panel: 11/15/2024  Screening Current      Other Preventive Screenings Covered by Medicare:  Abdominal Aortic Aneurysm (AAA) Screening: covered once if your at risk. You're considered to be at risk if you have a family history of AAA or a male between the age of 65-75 who smoking at least 100 cigarettes in your lifetime.  Lung Cancer Screening: covers low dose CT scan once per year if you meet all of the following conditions: (1) Age 55-77; (2) No signs or symptoms of lung cancer; (3) Current smoker or have quit smoking within the last 15 years; (4) You have a tobacco smoking history of at least 20 pack years (packs per day x number of years you smoked); (5) You get a written order from a healthcare provider.  Glaucoma Screening: covered annually if you're considered high risk: (1) You have diabetes OR (2) Family history of glaucoma OR (3)  aged 50 and older OR (4)  American aged 65 and older  Osteoporosis Screening: covered every 2 years if you meet one of the following conditions: (1) Have a vertebral abnormality; (2) On glucocorticoid therapy for more than 3 months; (3) Have primary hyperparathyroidism; (4) On osteoporosis medications and need to assess response to drug therapy.  HIV Screening: covered annually if you're between the age of 15-65. Also covered annually if you are younger than 15 and older than 65 with risk factors for HIV infection. For pregnant patients, it is covered up to 3 times per pregnancy.    Immunizations:  Immunization Recommendations   Influenza Vaccine Annual influenza vaccination during flu season is recommended for all persons aged >= 6 months who do not have contraindications   Pneumococcal Vaccine   * Pneumococcal conjugate vaccine = PCV13 (Prevnar 13), PCV15 (Vaxneuvance), PCV20 (Prevnar 20)  * Pneumococcal polysaccharide vaccine = PPSV23 (Pneumovax) Adults 19-63 yo with certain  risk factors or if 65+ yo  If never received any pneumonia vaccine: recommend Prevnar 20 (PCV20)  Give PCV20 if previously received 1 dose of PCV13 or PPSV23   Hepatitis B Vaccine 3 dose series if at intermediate or high risk (ex: diabetes, end stage renal disease, liver disease)   Respiratory syncytial virus (RSV) Vaccine - COVERED BY MEDICARE PART D  * RSVPreF3 (Arexvy) CDC recommends that adults 60 years of age and older may receive a single dose of RSV vaccine using shared clinical decision-making (SCDM)   Tetanus (Td) Vaccine - COST NOT COVERED BY MEDICARE PART B Following completion of primary series, a booster dose should be given every 10 years to maintain immunity against tetanus. Td may also be given as tetanus wound prophylaxis.   Tdap Vaccine - COST NOT COVERED BY MEDICARE PART B Recommended at least once for all adults. For pregnant patients, recommended with each pregnancy.   Shingles Vaccine (Shingrix) - COST NOT COVERED BY MEDICARE PART B  2 shot series recommended in those 19 years and older who have or will have weakened immune systems or those 50 years and older     Health Maintenance Due:      Topic Date Due   • Hepatitis C Screening  Never done   • HIV Screening  Never done   • Colorectal Cancer Screening  03/02/2032     Immunizations Due:      Topic Date Due   • Pneumococcal Vaccine: 65+ Years (1 of 1 - PCV) Never done   • COVID-19 Vaccine (5 - 2024-25 season) 09/01/2024     Advance Directives   What are advance directives?  Advance directives are legal documents that state your wishes and plans for medical care. These plans are made ahead of time in case you lose your ability to make decisions for yourself. Advance directives can apply to any medical decision, such as the treatments you want, and if you want to donate organs.   What are the types of advance directives?  There are many types of advance directives, and each state has rules about how to use them. You may choose a combination of  any of the following:  Living will:  This is a written record of the treatment you want. You can also choose which treatments you do not want, which to limit, and which to stop at a certain time. This includes surgery, medicine, IV fluid, and tube feedings.   Durable power of  for healthcare (DPAHC):  This is a written record that states who you want to make healthcare choices for you when you are unable to make them for yourself. This person, called a proxy, is usually a family member or a friend. You may choose more than 1 proxy.  Do not resuscitate (DNR) order:  A DNR order is used in case your heart stops beating or you stop breathing. It is a request not to have certain forms of treatment, such as CPR. A DNR order may be included in other types of advance directives.  Medical directive:  This covers the care that you want if you are in a coma, near death, or unable to make decisions for yourself. You can list the treatments you want for each condition. Treatment may include pain medicine, surgery, blood transfusions, dialysis, IV or tube feedings, and a ventilator (breathing machine).  Values history:  This document has questions about your views, beliefs, and how you feel and think about life. This information can help others choose the care that you would choose.  Why are advance directives important?  An advance directive helps you control your care. Although spoken wishes may be used, it is better to have your wishes written down. Spoken wishes can be misunderstood, or not followed. Treatments may be given even if you do not want them. An advance directive may make it easier for your family to make difficult choices about your care.   Weight Management   Why it is important to manage your weight:  Being overweight increases your risk of health conditions such as heart disease, high blood pressure, type 2 diabetes, and certain types of cancer. It can also increase your risk for osteoarthritis, sleep  apnea, and other respiratory problems. Aim for a slow, steady weight loss. Even a small amount of weight loss can lower your risk of health problems.  How to lose weight safely:  A safe and healthy way to lose weight is to eat fewer calories and get regular exercise. You can lose up about 1 pound a week by decreasing the number of calories you eat by 500 calories each day.   Healthy meal plan for weight management:  A healthy meal plan includes a variety of foods, contains fewer calories, and helps you stay healthy. A healthy meal plan includes the following:  Eat whole-grain foods more often.  A healthy meal plan should contain fiber. Fiber is the part of grains, fruits, and vegetables that is not broken down by your body. Whole-grain foods are healthy and provide extra fiber in your diet. Some examples of whole-grain foods are whole-wheat breads and pastas, oatmeal, brown rice, and bulgur.  Eat a variety of vegetables every day.  Include dark, leafy greens such as spinach, kale, meghna greens, and mustard greens. Eat yellow and orange vegetables such as carrots, sweet potatoes, and winter squash.   Eat a variety of fruits every day.  Choose fresh or canned fruit (canned in its own juice or light syrup) instead of juice. Fruit juice has very little or no fiber.  Eat low-fat dairy foods.  Drink fat-free (skim) milk or 1% milk. Eat fat-free yogurt and low-fat cottage cheese. Try low-fat cheeses such as mozzarella and other reduced-fat cheeses.  Choose meat and other protein foods that are low in fat.  Choose beans or other legumes such as split peas or lentils. Choose fish, skinless poultry (chicken or turkey), or lean cuts of red meat (beef or pork). Before you cook meat or poultry, cut off any visible fat.   Use less fat and oil.  Try baking foods instead of frying them. Add less fat, such as margarine, sour cream, regular salad dressing and mayonnaise to foods. Eat fewer high-fat foods. Some examples of high-fat  foods include french fries, doughnuts, ice cream, and cakes.  Eat fewer sweets.  Limit foods and drinks that are high in sugar. This includes candy, cookies, regular soda, and sweetened drinks.  Exercise:  Exercise at least 30 minutes per day on most days of the week. Some examples of exercise include walking, biking, dancing, and swimming. You can also fit in more physical activity by taking the stairs instead of the elevator or parking farther away from stores. Ask your healthcare provider about the best exercise plan for you.    © Copyright [employer] 2018 Information is for End User's use only and may not be sold, redistributed or otherwise used for commercial purposes. All illustrations and images included in CareNotes® are the copyrighted property of [employer] Inc. or [employer]

## 2025-06-02 NOTE — ASSESSMENT & PLAN NOTE
Previously listed as a diagnosis but the test did not prove that nor is he having symptoms so he declined having additional testing

## 2025-06-05 ENCOUNTER — TELEPHONE (OUTPATIENT)
Age: 65
End: 2025-06-05

## 2025-06-05 NOTE — TELEPHONE ENCOUNTER
Ksenia from [facility] called asking if the office received their fax for a lab requisition. She will re-fax just incase it didn't go through. Thank you!

## 2025-06-08 DIAGNOSIS — I10 ESSENTIAL HYPERTENSION: ICD-10-CM

## 2025-06-09 RX ORDER — AMLODIPINE BESYLATE 10 MG/1
10 TABLET ORAL DAILY
Qty: 90 TABLET | Refills: 1 | Status: SHIPPED | OUTPATIENT
Start: 2025-06-09

## 2025-06-09 NOTE — TELEPHONE ENCOUNTER
Sridevi from  [facility] called in to check on lab requisition form . They are requesting a yes or no. Lab can be contact at [phone]. Sridevi will call back in a week to follow up.

## 2025-06-10 NOTE — TELEPHONE ENCOUNTER
Patient called back and he did not authorize this testing. When he is ready to do this type of testing he will discuss and have the testing ordered through [facility]. Faxed form back with notation, patient does not want at this time.

## 2025-06-10 NOTE — TELEPHONE ENCOUNTER
Called and left message to have patient call back and verify he wants to do the Buccal Swab. ZBIGNIEW Richards did not order this testing.

## 2025-06-12 ENCOUNTER — TELEPHONE (OUTPATIENT)
Dept: FAMILY MEDICINE CLINIC | Facility: CLINIC | Age: 65
End: 2025-06-12

## 2025-06-12 NOTE — TELEPHONE ENCOUNTER
Left a message for patient to see if he requested lab tests from [facility], if so Tasha will order for him.

## 2025-06-16 ENCOUNTER — TELEPHONE (OUTPATIENT)
Age: 65
End: 2025-06-16

## 2025-06-16 NOTE — TELEPHONE ENCOUNTER
Spouse wife returning a call to the office nothing noted in patient chart of a call to patient. Did contact the office to check if they had call patient they did not. Patient has been getting numerous missed call.

## 2025-06-23 ENCOUNTER — OFFICE VISIT (OUTPATIENT)
Dept: FAMILY MEDICINE CLINIC | Facility: CLINIC | Age: 65
End: 2025-06-23
Payer: MEDICARE

## 2025-06-23 VITALS
DIASTOLIC BLOOD PRESSURE: 68 MMHG | SYSTOLIC BLOOD PRESSURE: 128 MMHG | OXYGEN SATURATION: 96 % | WEIGHT: 315 LBS | HEIGHT: 73 IN | HEART RATE: 75 BPM | BODY MASS INDEX: 41.75 KG/M2

## 2025-06-23 DIAGNOSIS — R60.0 PERIPHERAL EDEMA: Primary | ICD-10-CM

## 2025-06-23 PROCEDURE — G2211 COMPLEX E/M VISIT ADD ON: HCPCS | Performed by: STUDENT IN AN ORGANIZED HEALTH CARE EDUCATION/TRAINING PROGRAM

## 2025-06-23 PROCEDURE — 99213 OFFICE O/P EST LOW 20 MIN: CPT | Performed by: STUDENT IN AN ORGANIZED HEALTH CARE EDUCATION/TRAINING PROGRAM

## 2025-06-23 RX ORDER — FUROSEMIDE 20 MG/1
20 TABLET ORAL 2 TIMES DAILY
Qty: 7 TABLET | Refills: 0 | Status: SHIPPED | OUTPATIENT
Start: 2025-06-23 | End: 2025-06-27

## 2025-06-23 NOTE — PROGRESS NOTES
"Name: Maurice Crawford      : 1960      MRN: [id number]  Encounter Provider: Servando Medrano MD  Encounter Date: 2025   Encounter department: [facility] PRIMARY CARE  :  Assessment & Plan  Peripheral edema    Orders:    furosemide (LASIX) 20 mg tablet; Take 1 tablet (20 mg total) by mouth 2 (two) times a day for 7 doses    CBC and differential; Future    Comprehensive metabolic panel; Future           History of Present Illness   HPI    Presents with 1 week of swelling in the B/L ankles.   He recently returned from [place].  Has not be using any OTC medications.       Review of Systems   Constitutional:  Negative for activity change, appetite change, chills, fatigue and fever.   HENT:  Negative for congestion, dental problem, drooling, ear discharge, ear pain, facial swelling, postnasal drip, rhinorrhea and sinus pain.    Eyes:  Negative for photophobia, pain, discharge and itching.   Respiratory:  Negative for apnea, cough, chest tightness and shortness of breath.    Cardiovascular:  Positive for leg swelling. Negative for chest pain.   Gastrointestinal:  Negative for abdominal distention, abdominal pain, anal bleeding, constipation, diarrhea and nausea.   Endocrine: Negative for cold intolerance, heat intolerance and polydipsia.   Genitourinary:  Negative for difficulty urinating.   Musculoskeletal:  Negative for arthralgias, gait problem, joint swelling and myalgias.   Skin:  Negative for color change and pallor.   Allergic/Immunologic: Negative for immunocompromised state.   Neurological:  Negative for dizziness, seizures, facial asymmetry, weakness, light-headedness, numbness and headaches.   Psychiatric/Behavioral:  Negative for agitation, behavioral problems, confusion, decreased concentration and dysphoric mood.    All other systems reviewed and are negative.      Objective   /68 (BP Location: Left arm, Patient Position: Sitting, Cuff Size: Extra-Large)   Pulse 75   Ht 6' 1\" (1.854 " m)   Wt (!) 150 kg (330 lb)   SpO2 96%   BMI 43.54 kg/m²      Physical Exam  Constitutional:       Appearance: He is well-developed.   HENT:      Head: Normocephalic.     Eyes:      Pupils: Pupils are equal, round, and reactive to light.       Cardiovascular:      Rate and Rhythm: Normal rate and regular rhythm.   Pulmonary:      Effort: Pulmonary effort is normal.      Breath sounds: Normal breath sounds.   Abdominal:      General: Bowel sounds are normal.      Palpations: Abdomen is soft.     Musculoskeletal:         General: Swelling present. Normal range of motion.      Cervical back: Normal range of motion and neck supple.      Right lower leg: Edema present.      Left lower leg: Edema present.     Skin:     General: Skin is warm.

## 2025-06-25 ENCOUNTER — APPOINTMENT (OUTPATIENT)
Dept: LAB | Facility: HOSPITAL | Age: 65
End: 2025-06-25
Payer: MEDICARE

## 2025-06-25 DIAGNOSIS — R79.89 ELEVATED TSH: ICD-10-CM

## 2025-06-25 DIAGNOSIS — R79.89 ABNORMAL CBC: ICD-10-CM

## 2025-06-25 DIAGNOSIS — E55.9 VITAMIN D DEFICIENCY: ICD-10-CM

## 2025-06-25 DIAGNOSIS — Z12.5 SCREENING FOR PROSTATE CANCER: ICD-10-CM

## 2025-06-25 DIAGNOSIS — R73.09 ELEVATED GLUCOSE: ICD-10-CM

## 2025-06-25 DIAGNOSIS — E07.89 OTHER SPECIFIED DISORDERS OF THYROID: ICD-10-CM

## 2025-06-25 DIAGNOSIS — Z13.220 SCREENING CHOLESTEROL LEVEL: ICD-10-CM

## 2025-06-25 LAB
25(OH)D3 SERPL-MCNC: 68.9 NG/ML (ref 30–100)
ALBUMIN SERPL BCG-MCNC: 4.6 G/DL (ref 3.5–5)
ALP SERPL-CCNC: 72 U/L (ref 34–104)
ALT SERPL W P-5'-P-CCNC: 28 U/L (ref 7–52)
ANION GAP SERPL CALCULATED.3IONS-SCNC: 7 MMOL/L (ref 4–13)
AST SERPL W P-5'-P-CCNC: 20 U/L (ref 13–39)
BASOPHILS # BLD AUTO: 0.05 THOUSANDS/ÂΜL (ref 0–0.1)
BASOPHILS NFR BLD AUTO: 1 % (ref 0–1)
BILIRUB SERPL-MCNC: 0.78 MG/DL (ref 0.2–1)
BUN SERPL-MCNC: 16 MG/DL (ref 5–25)
CALCIUM SERPL-MCNC: 9.4 MG/DL (ref 8.4–10.2)
CHLORIDE SERPL-SCNC: 103 MMOL/L (ref 96–108)
CHOLEST SERPL-MCNC: 172 MG/DL (ref ?–200)
CO2 SERPL-SCNC: 26 MMOL/L (ref 21–32)
CREAT SERPL-MCNC: 0.91 MG/DL (ref 0.6–1.3)
EOSINOPHIL # BLD AUTO: 0.23 THOUSAND/ÂΜL (ref 0–0.61)
EOSINOPHIL NFR BLD AUTO: 3 % (ref 0–6)
ERYTHROCYTE [DISTWIDTH] IN BLOOD BY AUTOMATED COUNT: 12.8 % (ref 11.6–15.1)
EST. AVERAGE GLUCOSE BLD GHB EST-MCNC: 111 MG/DL
GFR SERPL CREATININE-BSD FRML MDRD: 88 ML/MIN/1.73SQ M
GLUCOSE P FAST SERPL-MCNC: 103 MG/DL (ref 65–99)
HBA1C MFR BLD: 5.5 %
HCT VFR BLD AUTO: 43.9 % (ref 36.5–49.3)
HDLC SERPL-MCNC: 39 MG/DL
HGB BLD-MCNC: 14.6 G/DL (ref 12–17)
IMM GRANULOCYTES # BLD AUTO: 0.01 THOUSAND/UL (ref 0–0.2)
IMM GRANULOCYTES NFR BLD AUTO: 0 % (ref 0–2)
LDLC SERPL CALC-MCNC: 103 MG/DL (ref 0–100)
LYMPHOCYTES # BLD AUTO: 2.13 THOUSANDS/ÂΜL (ref 0.6–4.47)
LYMPHOCYTES NFR BLD AUTO: 32 % (ref 14–44)
MCH RBC QN AUTO: 30 PG (ref 26.8–34.3)
MCHC RBC AUTO-ENTMCNC: 33.3 G/DL (ref 31.4–37.4)
MCV RBC AUTO: 90 FL (ref 82–98)
MONOCYTES # BLD AUTO: 0.76 THOUSAND/ÂΜL (ref 0.17–1.22)
MONOCYTES NFR BLD AUTO: 11 % (ref 4–12)
NEUTROPHILS # BLD AUTO: 3.53 THOUSANDS/ÂΜL (ref 1.85–7.62)
NEUTS SEG NFR BLD AUTO: 53 % (ref 43–75)
NRBC BLD AUTO-RTO: 0 /100 WBCS
PLATELET # BLD AUTO: 223 THOUSANDS/UL (ref 149–390)
PMV BLD AUTO: 9.3 FL (ref 8.9–12.7)
POTASSIUM SERPL-SCNC: 4.3 MMOL/L (ref 3.5–5.3)
PROT SERPL-MCNC: 7.3 G/DL (ref 6.4–8.4)
PSA SERPL-MCNC: 0.69 NG/ML (ref 0–4)
RBC # BLD AUTO: 4.87 MILLION/UL (ref 3.88–5.62)
SODIUM SERPL-SCNC: 136 MMOL/L (ref 135–147)
TRIGL SERPL-MCNC: 149 MG/DL (ref ?–150)
TSH SERPL DL<=0.05 MIU/L-ACNC: 3.32 UIU/ML (ref 0.45–4.5)
WBC # BLD AUTO: 6.71 THOUSAND/UL (ref 4.31–10.16)

## 2025-06-25 PROCEDURE — 84443 ASSAY THYROID STIM HORMONE: CPT

## 2025-06-25 PROCEDURE — 80061 LIPID PANEL: CPT

## 2025-06-25 PROCEDURE — 82306 VITAMIN D 25 HYDROXY: CPT

## 2025-06-25 PROCEDURE — 85025 COMPLETE CBC W/AUTO DIFF WBC: CPT

## 2025-06-25 PROCEDURE — G0103 PSA SCREENING: HCPCS

## 2025-06-25 PROCEDURE — 36415 COLL VENOUS BLD VENIPUNCTURE: CPT

## 2025-06-25 PROCEDURE — 83036 HEMOGLOBIN GLYCOSYLATED A1C: CPT

## 2025-06-25 PROCEDURE — 80053 COMPREHEN METABOLIC PANEL: CPT

## 2025-06-30 ENCOUNTER — TELEPHONE (OUTPATIENT)
Age: 65
End: 2025-06-30

## 2025-06-30 NOTE — TELEPHONE ENCOUNTER
Noted, it sounds like he does not require any additional follow-up.  If he continues with issues problems would recommend he contact his PCP

## 2025-06-30 NOTE — TELEPHONE ENCOUNTER
Pt called in to provide update to Dr. Medrano since last seeing him on 06/23.    Pt reports he has been taking: furosemide (LASIX) 20 mg tablet as instructed.   On fourth day he finally got relief.   Weighed himself & has lost 6 lbs.  Left ankle keeps swelling up when he gets up to stand or walk  Right foot swells a little, but not as bad as his left.  Pt will be taking last pill today.    For any further information or questions, please contact pt. Thank you!    Maurice: [phone]

## 2025-07-02 ENCOUNTER — OFFICE VISIT (OUTPATIENT)
Dept: FAMILY MEDICINE CLINIC | Facility: CLINIC | Age: 65
End: 2025-07-02
Payer: MEDICARE

## 2025-07-02 VITALS
HEIGHT: 73 IN | TEMPERATURE: 97.1 F | BODY MASS INDEX: 41.75 KG/M2 | SYSTOLIC BLOOD PRESSURE: 128 MMHG | HEART RATE: 74 BPM | OXYGEN SATURATION: 97 % | DIASTOLIC BLOOD PRESSURE: 80 MMHG | WEIGHT: 315 LBS

## 2025-07-02 DIAGNOSIS — M10.9 GOUT, UNSPECIFIED CAUSE, UNSPECIFIED CHRONICITY, UNSPECIFIED SITE: ICD-10-CM

## 2025-07-02 DIAGNOSIS — I10 ESSENTIAL HYPERTENSION: ICD-10-CM

## 2025-07-02 PROCEDURE — G2211 COMPLEX E/M VISIT ADD ON: HCPCS | Performed by: NURSE PRACTITIONER

## 2025-07-02 PROCEDURE — 99213 OFFICE O/P EST LOW 20 MIN: CPT | Performed by: NURSE PRACTITIONER

## 2025-07-02 RX ORDER — INDOMETHACIN 50 MG/1
50 CAPSULE ORAL
Qty: 30 CAPSULE | Refills: 1 | Status: SHIPPED | OUTPATIENT
Start: 2025-07-02

## 2025-07-02 RX ORDER — AMLODIPINE BESYLATE 10 MG/1
10 TABLET ORAL DAILY
Qty: 90 TABLET | Refills: 1 | Status: CANCELLED | OUTPATIENT
Start: 2025-07-02

## 2025-07-02 RX ORDER — IRBESARTAN AND HYDROCHLOROTHIAZIDE 300; 12.5 MG/1; MG/1
1 TABLET, FILM COATED ORAL DAILY
Qty: 100 TABLET | Refills: 3 | Status: SHIPPED | OUTPATIENT
Start: 2025-07-02

## 2025-07-02 NOTE — ASSESSMENT & PLAN NOTE
Orders:    indomethacin (INDOCIN) 50 mg capsule; Take 1 capsule (50 mg total) by mouth 3 (three) times a day with meals

## 2025-07-02 NOTE — ASSESSMENT & PLAN NOTE
I will add Hct to irbesartan.  Call in a week with his blood pressure readings and also any other intolerances going to have him stop the amlodipine, reviewed triggers trying to elevate legs started to swell we reviewed how heat and Indocin might play a factor but I think it is mostly due to the amlodipine  Orders:    irbesartan-hydrochlorothiazide (AVALIDE) 300-12.5 MG per tablet; Take 1 tablet by mouth daily

## 2025-07-02 NOTE — PROGRESS NOTES
"Name: Maurice Crawford      : 1960      MRN: [id number]  Encounter Provider: ZBIGNIEW Burt  Encounter Date: 2025   Encounter department: [facility] PRIMARY CARE  :  Assessment & Plan  Essential hypertension  I will add Hct to irbesartan.  Call in a week with his blood pressure readings and also any other intolerances going to have him stop the amlodipine, reviewed triggers trying to elevate legs started to swell we reviewed how heat and Indocin might play a factor but I think it is mostly due to the amlodipine  Orders:    irbesartan-hydrochlorothiazide (AVALIDE) 300-12.5 MG per tablet; Take 1 tablet by mouth daily    Gout, unspecified cause, unspecified chronicity, unspecified site    Orders:    indomethacin (INDOCIN) 50 mg capsule; Take 1 capsule (50 mg total) by mouth 3 (three) times a day with meals           History of Present Illness   Patient complains of edema.  Took Lasix for a week. Has been walking vacation in the areas with a high temperature.  He tried taking Lasix every day for 7 days at 20 mg and he greatly improved but it did not resolve.  He does have an Indocin prescription he estimates he takes it once or twice a month for day or 2.  It is prescribed for gout but he will occasionally use it for other achy joints as well.  Recent labs with normal kidney function and thyroid function  Overall feels good enjoying his assisted chest pain or shortness of breath      Review of Systems    Objective   /80 (BP Location: Left arm, Patient Position: Sitting, Cuff Size: Large)   Pulse 74   Temp (!) 97.1 °F (36.2 °C) (Tympanic)   Ht 6' 1\" (1.854 m)   Wt (!) 148 kg (326 lb)   SpO2 97%   BMI 43.01 kg/m²      Physical Exam  Constitutional:       Appearance: Normal appearance.     Musculoskeletal:      Right lower leg: Edema present.      Left lower leg: Edema present.      Comments: Trace nonpitting     Neurological:      Mental Status: He is alert.         "

## 2025-07-24 ENCOUNTER — TELEPHONE (OUTPATIENT)
Dept: FAMILY MEDICINE CLINIC | Facility: CLINIC | Age: 65
End: 2025-07-24

## 2025-07-25 ENCOUNTER — DOCUMENTATION (OUTPATIENT)
Dept: ADMINISTRATIVE | Facility: OTHER | Age: 65
End: 2025-07-25

## 2025-07-25 VITALS — SYSTOLIC BLOOD PRESSURE: 133 MMHG | DIASTOLIC BLOOD PRESSURE: 71 MMHG

## 2025-07-25 NOTE — PROGRESS NOTES
07/25/25 8:46 AM    Patient Reported home blood pressure(s) documented in chart.    Thank you.  Dimple Kennedy MA  [facility]

## 2025-07-25 NOTE — PROGRESS NOTES
Last Update Description Specialty     Yesterday Patient Reported Blood Pressure (BP reading of 133/71 on 7/20 other readings scanned to chart ) Family Medicine     [facility] Chiqui Slater Closed     3 weeks ago FYI - Patient Update Family Medicine     [facility] Kayla Vergara Closed     1 month ago Advice Only Family Medicine     [facility] Case Johnson Closed     1 month ago -- Family Medicine     [facility] Cassie Lira Closed